# Patient Record
Sex: FEMALE | Race: OTHER | HISPANIC OR LATINO
[De-identification: names, ages, dates, MRNs, and addresses within clinical notes are randomized per-mention and may not be internally consistent; named-entity substitution may affect disease eponyms.]

---

## 2022-10-16 ENCOUNTER — TRANSCRIPTION ENCOUNTER (OUTPATIENT)
Age: 87
End: 2022-10-16

## 2022-10-16 VITALS
HEIGHT: 56 IN | SYSTOLIC BLOOD PRESSURE: 170 MMHG | HEART RATE: 100 BPM | RESPIRATION RATE: 18 BRPM | TEMPERATURE: 98 F | OXYGEN SATURATION: 98 % | DIASTOLIC BLOOD PRESSURE: 75 MMHG | WEIGHT: 93.92 LBS

## 2022-10-16 LAB
BASOPHILS # BLD AUTO: 0.05 K/UL — SIGNIFICANT CHANGE UP (ref 0–0.2)
BASOPHILS NFR BLD AUTO: 0.4 % — SIGNIFICANT CHANGE UP (ref 0–2)
BLD GP AB SCN SERPL QL: NEGATIVE — SIGNIFICANT CHANGE UP
EOSINOPHIL # BLD AUTO: 0.16 K/UL — SIGNIFICANT CHANGE UP (ref 0–0.5)
EOSINOPHIL NFR BLD AUTO: 1.4 % — SIGNIFICANT CHANGE UP (ref 0–6)
HCT VFR BLD CALC: 23.1 % — LOW (ref 34.5–45)
HGB BLD-MCNC: 7.8 G/DL — LOW (ref 11.5–15.5)
IMM GRANULOCYTES NFR BLD AUTO: 0.4 % — SIGNIFICANT CHANGE UP (ref 0–0.9)
LYMPHOCYTES # BLD AUTO: 17.7 % — SIGNIFICANT CHANGE UP (ref 13–44)
LYMPHOCYTES # BLD AUTO: 2.05 K/UL — SIGNIFICANT CHANGE UP (ref 1–3.3)
MCHC RBC-ENTMCNC: 31.1 PG — SIGNIFICANT CHANGE UP (ref 27–34)
MCHC RBC-ENTMCNC: 33.8 GM/DL — SIGNIFICANT CHANGE UP (ref 32–36)
MCV RBC AUTO: 92 FL — SIGNIFICANT CHANGE UP (ref 80–100)
MONOCYTES # BLD AUTO: 1.07 K/UL — HIGH (ref 0–0.9)
MONOCYTES NFR BLD AUTO: 9.2 % — SIGNIFICANT CHANGE UP (ref 2–14)
NEUTROPHILS # BLD AUTO: 8.22 K/UL — HIGH (ref 1.8–7.4)
NEUTROPHILS NFR BLD AUTO: 70.9 % — SIGNIFICANT CHANGE UP (ref 43–77)
NRBC # BLD: 0 /100 WBCS — SIGNIFICANT CHANGE UP (ref 0–0)
PLATELET # BLD AUTO: 252 K/UL — SIGNIFICANT CHANGE UP (ref 150–400)
RBC # BLD: 2.51 M/UL — LOW (ref 3.8–5.2)
RBC # FLD: 12.8 % — SIGNIFICANT CHANGE UP (ref 10.3–14.5)
RH IG SCN BLD-IMP: POSITIVE — SIGNIFICANT CHANGE UP
WBC # BLD: 11.6 K/UL — HIGH (ref 3.8–10.5)
WBC # FLD AUTO: 11.6 K/UL — HIGH (ref 3.8–10.5)

## 2022-10-16 PROCEDURE — 74174 CTA ABD&PLVS W/CONTRAST: CPT | Mod: 26,MA

## 2022-10-16 PROCEDURE — 99285 EMERGENCY DEPT VISIT HI MDM: CPT

## 2022-10-16 NOTE — ED PROVIDER NOTE - OBJECTIVE STATEMENT
88 y/o F, PMHx of PAD, angiogram of bilat LE on 10/14, comes in for pain and ecchymosis to R abdomen, flank, hip and thigh. As per daughter, they entered through ankle and not groin. They noted large area of ecchymosis. Pt was on ASA and Plavix. 86 y/o F, PMHx of PAD, had angiogram of bilat LE on 10/14 incl to R popliteal artery, comes in for pain and ecchymosis to R abdomen, flank, hip and thigh.   Pt is on asa and plavix.

## 2022-10-16 NOTE — ED ADULT NURSE NOTE - CHIEF COMPLAINT QUOTE
Pt had procedure on  R popliteal artery on 10/14, presents today co ecchymosis to R side of abdomen. Denies injury or trauma. On ASA and Plavix daily.
Alert and oriented to person, place and time/Patient baseline mental status/Awake/Symptoms improved/Dressing clean and dry/No adverse reaction to first time med in ED

## 2022-10-16 NOTE — ED PROVIDER NOTE - CARE PLAN
Principal Discharge DX:	Hematoma  Secondary Diagnosis:	Post-operative state  Secondary Diagnosis:	Anemia   1

## 2022-10-16 NOTE — ED ADULT NURSE NOTE - OBJECTIVE STATEMENT
Patient a+o x4, Afghan-speaking, hx obtained by daughter at bedside, c/o discoloration to right flank increasing in size since procedure on Friday. Taking asa and plavix daily, states no trauma/injury to area. Speaking in full sentences without difficulty, denies sob/cp/dizziness/n/v/fever/chills. IV initiated, labs collected and sent; tolerated well. Safety measures initiated, comfort measures rendered. Awaiting results.

## 2022-10-16 NOTE — ED PROVIDER NOTE - PROGRESS NOTE DETAILS
[jonh / sue]  received on sign out. pt here w right abd, flank, thigh ecchymosis and pain after RLE angiogram on 10/14 with right ankle access. no groin access.   thus far - hgb 8.1 / hct 23.4 -> hgb 7.8 / hct 23.1 at 4 hour lindsey. cta w large hematoma but no visualized active bleed.  at time of sign out planned for rpt cbc at 3AM. shoemaker - third CBC showing hgb 7.0 / hct 20.4. given continued drop and now at borderline for transfusion will consult vascular for evaluation. shoemaker - rpt cbc stable at 7.1 / hct 20.8. vascular to admit pt for continued management. shoemaker - vascular requesting another rpt cbc. will trend. dispo per vascular

## 2022-10-16 NOTE — ED ADULT TRIAGE NOTE - CHIEF COMPLAINT QUOTE
Pt had procedure on  R popliteal artery on 10/14, presents today co ecchymosis to R side of abdomen. Denies injury or trauma. On ASA and Plavix daily.

## 2022-10-16 NOTE — ED PROVIDER NOTE - PHYSICAL EXAMINATION
CONST: nontoxic NAD speaking in full sentences  HEAD: atraumatic  EYES: conjunctivae clear, PERRL, EOMI  ENT: mmm  NECK: supple/FROM, nttp, no jvd  CARD: rrr no murmurs  CHEST: ctab no r/r/w, no stridor/retractions/tripoding  ABD: soft, nd, (+) ecchymosis to R flank and abdominal ttp, extends to anterior lateral R thigh w/ hematoma and extend to pubic symphysis.   EXT: FROM, symmetric distal pulses intact  SKIN: warm, dry, no rash, no pedal edema/ttp/rash, cap refill <2sec  NEURO: a+ox3, 5/5 strength x4, gross sensation intact x4, baseline gait CONST: nontoxic NAD speaking in full sentences  HEAD: atraumatic  EYES: conjunctivae clear, PERRL, EOMI  ENT: mmm  NECK: supple/FROM, nttp, no jvd  CARD: rrr no murmurs  CHEST: ctab no r/r/w, no stridor/retractions/tripoding  ABD: soft, nd, (+) ecchymosis to R flank and abdominal wall, + ttp, extends to anterolateral R thigh w/ hematoma and extend to pubic symphysis.   EXT: FROM, symmetric distal pulses intact  SKIN: warm, dry, no rash, no pedal edema/ttp/rash, cap refill <2sec  NEURO: a+ox3, 5/5 strength x4, gross sensation intact x4

## 2022-10-16 NOTE — ED ADULT NURSE NOTE - NSIMPLEMENTINTERV_GEN_ALL_ED
Implemented All Fall with Harm Risk Interventions:  [place] to call system. Call bell, personal items and telephone within reach. Instruct patient to call for assistance. Room bathroom lighting operational. Non-slip footwear when patient is off stretcher. Physically safe environment: no spills, clutter or unnecessary equipment. Stretcher in lowest position, wheels locked, appropriate side rails in place. Provide visual cue, wrist band, yellow gown, etc. Monitor gait and stability. Monitor for mental status changes and reorient to person, place, and time. Review medications for side effects contributing to fall risk. Reinforce activity limits and safety measures with patient and family. Provide visual clues: red socks.

## 2022-10-16 NOTE — ED PROVIDER NOTE - CLINICAL SUMMARY MEDICAL DECISION MAKING FREE TEXT BOX
Concern for hematoma vs active bleeding post-angiogram  CTA to look for active bleed  serial CBCs to check for H&H      ---->CT shows large hematoma, NO ACTIVE BLEED. Plan for serial CBC and if H&H remains stable then pt can be dc with close follow up. Will sign out to Dr. San pending repeat CBC at 3AM.

## 2022-10-16 NOTE — ED PROVIDER NOTE - NSFOLLOWUPINSTRUCTIONS_ED_ALL_ED_FT
CALL YOUR VASCULAR DOCTOR FIRST THING TOMORROW MORNING. FOLLOW UP DUE TO YOUR EXTENSIVE BRUISING AND BLEEDING UNDER THE SKIN. ALSO ASK THEM IF YOU SHOULD CONTINUE YOUR ASPIRIN AND PLAVIX OR STOP IT FOR NOW.

## 2022-10-17 ENCOUNTER — INPATIENT (INPATIENT)
Facility: HOSPITAL | Age: 87
LOS: 0 days | Discharge: HOME | DRG: 921 | End: 2022-10-18
Attending: SURGERY | Admitting: SURGERY
Payer: MEDICARE

## 2022-10-17 LAB
ALBUMIN SERPL ELPH-MCNC: 3.3 G/DL — SIGNIFICANT CHANGE UP (ref 3.3–5)
ALP SERPL-CCNC: 85 U/L — SIGNIFICANT CHANGE UP (ref 40–120)
ALT FLD-CCNC: 8 U/L — LOW (ref 10–45)
ANION GAP SERPL CALC-SCNC: 10 MMOL/L — SIGNIFICANT CHANGE UP (ref 5–17)
APTT BLD: 26.8 SEC — LOW (ref 27.5–35.5)
AST SERPL-CCNC: 14 U/L — SIGNIFICANT CHANGE UP (ref 10–40)
BASOPHILS # BLD AUTO: 0.04 K/UL — SIGNIFICANT CHANGE UP (ref 0–0.2)
BASOPHILS NFR BLD AUTO: 0.4 % — SIGNIFICANT CHANGE UP (ref 0–2)
BILIRUB SERPL-MCNC: 0.3 MG/DL — SIGNIFICANT CHANGE UP (ref 0.2–1.2)
BUN SERPL-MCNC: 10 MG/DL — SIGNIFICANT CHANGE UP (ref 7–23)
CALCIUM SERPL-MCNC: 8.6 MG/DL — SIGNIFICANT CHANGE UP (ref 8.4–10.5)
CHLORIDE SERPL-SCNC: 100 MMOL/L — SIGNIFICANT CHANGE UP (ref 96–108)
CO2 SERPL-SCNC: 24 MMOL/L — SIGNIFICANT CHANGE UP (ref 22–31)
CREAT SERPL-MCNC: 0.55 MG/DL — SIGNIFICANT CHANGE UP (ref 0.5–1.3)
EGFR: 89 ML/MIN/1.73M2 — SIGNIFICANT CHANGE UP
EOSINOPHIL # BLD AUTO: 0.11 K/UL — SIGNIFICANT CHANGE UP (ref 0–0.5)
EOSINOPHIL NFR BLD AUTO: 1.2 % — SIGNIFICANT CHANGE UP (ref 0–6)
GLUCOSE BLDC GLUCOMTR-MCNC: 125 MG/DL — HIGH (ref 70–99)
GLUCOSE BLDC GLUCOMTR-MCNC: 212 MG/DL — HIGH (ref 70–99)
GLUCOSE BLDC GLUCOMTR-MCNC: 287 MG/DL — HIGH (ref 70–99)
GLUCOSE SERPL-MCNC: 355 MG/DL — HIGH (ref 70–99)
HCT VFR BLD CALC: 20.4 % — CRITICAL LOW (ref 34.5–45)
HCT VFR BLD CALC: 20.8 % — CRITICAL LOW (ref 34.5–45)
HCT VFR BLD CALC: 29.3 % — LOW (ref 34.5–45)
HGB BLD-MCNC: 10 G/DL — LOW (ref 11.5–15.5)
HGB BLD-MCNC: 7 G/DL — CRITICAL LOW (ref 11.5–15.5)
HGB BLD-MCNC: 7.1 G/DL — LOW (ref 11.5–15.5)
IMM GRANULOCYTES NFR BLD AUTO: 0.5 % — SIGNIFICANT CHANGE UP (ref 0–0.9)
INR BLD: 0.91 — SIGNIFICANT CHANGE UP (ref 0.88–1.16)
LYMPHOCYTES # BLD AUTO: 1.85 K/UL — SIGNIFICANT CHANGE UP (ref 1–3.3)
LYMPHOCYTES # BLD AUTO: 19.4 % — SIGNIFICANT CHANGE UP (ref 13–44)
MCHC RBC-ENTMCNC: 31.3 PG — SIGNIFICANT CHANGE UP (ref 27–34)
MCHC RBC-ENTMCNC: 31.4 PG — SIGNIFICANT CHANGE UP (ref 27–34)
MCHC RBC-ENTMCNC: 31.8 PG — SIGNIFICANT CHANGE UP (ref 27–34)
MCHC RBC-ENTMCNC: 34.1 GM/DL — SIGNIFICANT CHANGE UP (ref 32–36)
MCHC RBC-ENTMCNC: 34.1 GM/DL — SIGNIFICANT CHANGE UP (ref 32–36)
MCHC RBC-ENTMCNC: 34.3 GM/DL — SIGNIFICANT CHANGE UP (ref 32–36)
MCV RBC AUTO: 91.6 FL — SIGNIFICANT CHANGE UP (ref 80–100)
MCV RBC AUTO: 92.1 FL — SIGNIFICANT CHANGE UP (ref 80–100)
MCV RBC AUTO: 92.7 FL — SIGNIFICANT CHANGE UP (ref 80–100)
MONOCYTES # BLD AUTO: 0.94 K/UL — HIGH (ref 0–0.9)
MONOCYTES NFR BLD AUTO: 9.9 % — SIGNIFICANT CHANGE UP (ref 2–14)
NEUTROPHILS # BLD AUTO: 6.53 K/UL — SIGNIFICANT CHANGE UP (ref 1.8–7.4)
NEUTROPHILS NFR BLD AUTO: 68.6 % — SIGNIFICANT CHANGE UP (ref 43–77)
NRBC # BLD: 0 /100 WBCS — SIGNIFICANT CHANGE UP (ref 0–0)
PLATELET # BLD AUTO: 239 K/UL — SIGNIFICANT CHANGE UP (ref 150–400)
PLATELET # BLD AUTO: 245 K/UL — SIGNIFICANT CHANGE UP (ref 150–400)
PLATELET # BLD AUTO: 255 K/UL — SIGNIFICANT CHANGE UP (ref 150–400)
POTASSIUM SERPL-MCNC: 3.9 MMOL/L — SIGNIFICANT CHANGE UP (ref 3.5–5.3)
POTASSIUM SERPL-SCNC: 3.9 MMOL/L — SIGNIFICANT CHANGE UP (ref 3.5–5.3)
PROT SERPL-MCNC: 6.2 G/DL — SIGNIFICANT CHANGE UP (ref 6–8.3)
PROTHROM AB SERPL-ACNC: 10.8 SEC — SIGNIFICANT CHANGE UP (ref 10.5–13.4)
RBC # BLD: 2.2 M/UL — LOW (ref 3.8–5.2)
RBC # BLD: 2.27 M/UL — LOW (ref 3.8–5.2)
RBC # BLD: 3.18 M/UL — LOW (ref 3.8–5.2)
RBC # FLD: 13 % — SIGNIFICANT CHANGE UP (ref 10.3–14.5)
RBC # FLD: 13 % — SIGNIFICANT CHANGE UP (ref 10.3–14.5)
RBC # FLD: 13.5 % — SIGNIFICANT CHANGE UP (ref 10.3–14.5)
RH IG SCN BLD-IMP: POSITIVE — SIGNIFICANT CHANGE UP
SODIUM SERPL-SCNC: 134 MMOL/L — LOW (ref 135–145)
WBC # BLD: 10.01 K/UL — SIGNIFICANT CHANGE UP (ref 3.8–10.5)
WBC # BLD: 9.52 K/UL — SIGNIFICANT CHANGE UP (ref 3.8–10.5)
WBC # BLD: 9.78 K/UL — SIGNIFICANT CHANGE UP (ref 3.8–10.5)
WBC # FLD AUTO: 10.01 K/UL — SIGNIFICANT CHANGE UP (ref 3.8–10.5)
WBC # FLD AUTO: 9.52 K/UL — SIGNIFICANT CHANGE UP (ref 3.8–10.5)
WBC # FLD AUTO: 9.78 K/UL — SIGNIFICANT CHANGE UP (ref 3.8–10.5)

## 2022-10-17 PROCEDURE — 71045 X-RAY EXAM CHEST 1 VIEW: CPT | Mod: 26

## 2022-10-17 RX ORDER — INSULIN GLARGINE 100 [IU]/ML
12 INJECTION, SOLUTION SUBCUTANEOUS AT BEDTIME
Refills: 0 | Status: DISCONTINUED | OUTPATIENT
Start: 2022-10-17 | End: 2022-10-18

## 2022-10-17 RX ORDER — DEXTROSE 50 % IN WATER 50 %
25 SYRINGE (ML) INTRAVENOUS ONCE
Refills: 0 | Status: DISCONTINUED | OUTPATIENT
Start: 2022-10-17 | End: 2022-10-18

## 2022-10-17 RX ORDER — INSULIN LISPRO 100/ML
VIAL (ML) SUBCUTANEOUS
Refills: 0 | Status: DISCONTINUED | OUTPATIENT
Start: 2022-10-17 | End: 2022-10-18

## 2022-10-17 RX ORDER — SODIUM CHLORIDE 9 MG/ML
1000 INJECTION, SOLUTION INTRAVENOUS
Refills: 0 | Status: DISCONTINUED | OUTPATIENT
Start: 2022-10-17 | End: 2022-10-18

## 2022-10-17 RX ORDER — ATORVASTATIN CALCIUM 80 MG/1
40 TABLET, FILM COATED ORAL AT BEDTIME
Refills: 0 | Status: DISCONTINUED | OUTPATIENT
Start: 2022-10-17 | End: 2022-10-18

## 2022-10-17 RX ORDER — FUROSEMIDE 40 MG
10 TABLET ORAL ONCE
Refills: 0 | Status: COMPLETED | OUTPATIENT
Start: 2022-10-17 | End: 2022-10-17

## 2022-10-17 RX ORDER — INFLUENZA VIRUS VACCINE 15; 15; 15; 15 UG/.5ML; UG/.5ML; UG/.5ML; UG/.5ML
0.7 SUSPENSION INTRAMUSCULAR ONCE
Refills: 0 | Status: DISCONTINUED | OUTPATIENT
Start: 2022-10-17 | End: 2022-10-18

## 2022-10-17 RX ORDER — TRAMADOL HYDROCHLORIDE 50 MG/1
25 TABLET ORAL EVERY 6 HOURS
Refills: 0 | Status: DISCONTINUED | OUTPATIENT
Start: 2022-10-17 | End: 2022-10-18

## 2022-10-17 RX ORDER — RANOLAZINE 500 MG/1
500 TABLET, FILM COATED, EXTENDED RELEASE ORAL
Refills: 0 | Status: DISCONTINUED | OUTPATIENT
Start: 2022-10-17 | End: 2022-10-17

## 2022-10-17 RX ORDER — DILTIAZEM HCL 120 MG
120 CAPSULE, EXT RELEASE 24 HR ORAL EVERY 24 HOURS
Refills: 0 | Status: DISCONTINUED | OUTPATIENT
Start: 2022-10-17 | End: 2022-10-18

## 2022-10-17 RX ORDER — CILOSTAZOL 100 MG/1
50 TABLET ORAL
Refills: 0 | Status: DISCONTINUED | OUTPATIENT
Start: 2022-10-17 | End: 2022-10-17

## 2022-10-17 RX ORDER — DEXTROSE 50 % IN WATER 50 %
15 SYRINGE (ML) INTRAVENOUS ONCE
Refills: 0 | Status: DISCONTINUED | OUTPATIENT
Start: 2022-10-17 | End: 2022-10-18

## 2022-10-17 RX ORDER — INSULIN LISPRO 100/ML
5 VIAL (ML) SUBCUTANEOUS ONCE
Refills: 0 | Status: COMPLETED | OUTPATIENT
Start: 2022-10-17 | End: 2022-10-17

## 2022-10-17 RX ORDER — CLOPIDOGREL BISULFATE 75 MG/1
75 TABLET, FILM COATED ORAL DAILY
Refills: 0 | Status: DISCONTINUED | OUTPATIENT
Start: 2022-10-17 | End: 2022-10-18

## 2022-10-17 RX ORDER — ASPIRIN/CALCIUM CARB/MAGNESIUM 324 MG
81 TABLET ORAL DAILY
Refills: 0 | Status: DISCONTINUED | OUTPATIENT
Start: 2022-10-17 | End: 2022-10-18

## 2022-10-17 RX ORDER — MORPHINE SULFATE 50 MG/1
2 CAPSULE, EXTENDED RELEASE ORAL ONCE
Refills: 0 | Status: DISCONTINUED | OUTPATIENT
Start: 2022-10-17 | End: 2022-10-17

## 2022-10-17 RX ORDER — GLUCAGON INJECTION, SOLUTION 0.5 MG/.1ML
1 INJECTION, SOLUTION SUBCUTANEOUS ONCE
Refills: 0 | Status: DISCONTINUED | OUTPATIENT
Start: 2022-10-17 | End: 2022-10-18

## 2022-10-17 RX ORDER — SODIUM CHLORIDE 9 MG/ML
1000 INJECTION INTRAMUSCULAR; INTRAVENOUS; SUBCUTANEOUS
Refills: 0 | Status: DISCONTINUED | OUTPATIENT
Start: 2022-10-17 | End: 2022-10-17

## 2022-10-17 RX ORDER — LEVOTHYROXINE SODIUM 125 MCG
75 TABLET ORAL DAILY
Refills: 0 | Status: DISCONTINUED | OUTPATIENT
Start: 2022-10-17 | End: 2022-10-18

## 2022-10-17 RX ORDER — METOPROLOL TARTRATE 50 MG
25 TABLET ORAL EVERY 24 HOURS
Refills: 0 | Status: DISCONTINUED | OUTPATIENT
Start: 2022-10-17 | End: 2022-10-18

## 2022-10-17 RX ORDER — DEXTROSE 50 % IN WATER 50 %
12.5 SYRINGE (ML) INTRAVENOUS ONCE
Refills: 0 | Status: DISCONTINUED | OUTPATIENT
Start: 2022-10-17 | End: 2022-10-18

## 2022-10-17 RX ADMIN — Medication 4: at 22:17

## 2022-10-17 RX ADMIN — TRAMADOL HYDROCHLORIDE 25 MILLIGRAM(S): 50 TABLET ORAL at 22:19

## 2022-10-17 RX ADMIN — ATORVASTATIN CALCIUM 40 MILLIGRAM(S): 80 TABLET, FILM COATED ORAL at 21:51

## 2022-10-17 RX ADMIN — SODIUM CHLORIDE 50 MILLILITER(S): 9 INJECTION INTRAMUSCULAR; INTRAVENOUS; SUBCUTANEOUS at 12:12

## 2022-10-17 RX ADMIN — Medication 75 MICROGRAM(S): at 14:59

## 2022-10-17 RX ADMIN — Medication 81 MILLIGRAM(S): at 15:00

## 2022-10-17 RX ADMIN — Medication 6: at 12:38

## 2022-10-17 RX ADMIN — Medication 25 MILLIGRAM(S): at 14:59

## 2022-10-17 RX ADMIN — Medication 10 MILLIGRAM(S): at 19:06

## 2022-10-17 RX ADMIN — MORPHINE SULFATE 2 MILLIGRAM(S): 50 CAPSULE, EXTENDED RELEASE ORAL at 01:31

## 2022-10-17 RX ADMIN — TRAMADOL HYDROCHLORIDE 25 MILLIGRAM(S): 50 TABLET ORAL at 21:51

## 2022-10-17 RX ADMIN — INSULIN GLARGINE 12 UNIT(S): 100 INJECTION, SOLUTION SUBCUTANEOUS at 22:14

## 2022-10-17 RX ADMIN — Medication 120 MILLIGRAM(S): at 14:59

## 2022-10-17 RX ADMIN — CLOPIDOGREL BISULFATE 75 MILLIGRAM(S): 75 TABLET, FILM COATED ORAL at 14:59

## 2022-10-17 RX ADMIN — MORPHINE SULFATE 2 MILLIGRAM(S): 50 CAPSULE, EXTENDED RELEASE ORAL at 01:07

## 2022-10-17 NOTE — H&P ADULT - ASSESSMENT
A/P: 86 y/o F, Hx of HTN, DM, Hypothroidism and cardiac stent.  Pt s/p RLE angiogram through R ankle (per daughter) on 10/14.  Pt had L leg angiogram 1mth prior.   Pt scheduled to have repeat procedure on 10/28.     Pt presents to [facility] c/o pain and ecchymosis to R abdomen, flank, hip and thigh.  Pt's daughter states the onset of symptoms was approx 24hrs after procedure. CT shows Abdominal wall hematoma right flank and lower quadrant approximally 7 x 1.7 by 18cm.     Vascular/PAD:  - admit to Dr. Salas       HTN/HLD:  resume metoprolol   resume diltiazem     CAD/CHF:   ASA  plavix     DM:  lantus 12 QHS, ISS     Anemia:   - trend hgb   - 1PRBC now   - f/u am labs     Diet:   consistent carbs     Activity:   as adi     DVTPPx:   holding     Dispo: 5 uris

## 2022-10-17 NOTE — CONSULT NOTE ADULT - ASSESSMENT
A/P: 87 y.o female s/p RLE angio complicated by R RP hematoma; down trending cbc without active bleed.    -Repeat stat cbc  -Vascular will follow A/P: 87 y.o female s/p RLE angio complicated by R Abdominal wall hematoma; down trending cbc without active bleed.    -Repeat stat cbc  -Vascular will follow

## 2022-10-17 NOTE — CONSULT NOTE ADULT - SUBJECTIVE AND OBJECTIVE BOX
Vascular Attending:  Josue      HPI:  86 y/o F, Hx of HTN, DM, Hypothroidism and cardiac stent.  Pt s/p RLE angiogram through R ankle (per daughter)  on 10/14.  Pt scheduled to have repeat procedure on 10/28.     Pt presents to [facility] c/o pain and ecchymosis to R abdomen, flank, hip and thigh. Pt's daughter states the onset of symptoms was approx 24hrs after procedure.  Denies CP, blurry vision, N, V.        PAST MEDICAL & SURGICAL HISTORY:  B/L lower extremity Angiograms  Cardiac stent    REVIEW OF SYSTEMS  Neg except as in HPI      Allergic/Immunologic:	  NKDA    Allergies  No Known Allergies    Vital Signs Last 24 Hrs  T(C): 36.7 (16 Oct 2022 17:46), Max: 36.7 (16 Oct 2022 17:46)  T(F): 98.1 (16 Oct 2022 17:46), Max: 98.1 (16 Oct 2022 17:46)  HR: 90 (17 Oct 2022 01:10) (90 - 100)  BP: 170/76 (17 Oct 2022 01:10) (167/71 - 170/76)  BP(mean): --  RR: 16 (17 Oct 2022 01:10) (16 - 18)  SpO2: 95% (17 Oct 2022 01:10) (95% - 98%)    Parameters below as of 17 Oct 2022 01:10  Patient On (Oxygen Delivery Method): room air    PHYSICAL EXAM:    Constitutional:  PT AXOX3 in NAD    Respiratory: Non Labored    Cardiovascular: S1S2 RRR    Gastrointestinal: R induration , mild tenderness, ecchymotic to R flank/pelvis    Extremities: warm no lesions    Vascular: R. Tri DP/PT palp fem  L Bi/Tri Dp/Pt    Neurological: intact motor sensori        LABS:                        7.0    9.78  )-----------( 245      ( 17 Oct 2022 03:07 )             20.4     10-16    131<L>  |  98  |  12  ----------------------------<  301<H>  See Note   |  24  |  0.49<L>    Ca    8.7      16 Oct 2022 18:53    TPro  7.6  /  Alb  See Note  /  TBili  0.3  /  DBili  x   /  AST  See Note  /  ALT  14  /  AlkPhos  See Note  10-16    PT/INR - ( 16 Oct 2022 23:57 )   PT: 10.8 sec;   INR: 0.91          PTT - ( 16 Oct 2022 23:57 )  PTT:26.8 sec      RADIOLOGY & ADDITIONAL STUDIES Vascular Attending:  Josue      HPI:  88 y/o F, Hx of HTN, DM, Hypothroidism and cardiac stent.  Pt s/p RLE angiogram through R ankle (per daughter) on 10/14.  Pt had L leg angiogram 1mth prior.   Pt scheduled to have repeat procedure on 10/28.     Pt presents to [facility] c/o pain and ecchymosis to R abdomen, flank, hip and thigh.  Pt's daughter states the onset of symptoms was approx 24hrs after procedure.  Denies CP, blurry vision, N, V.        PAST MEDICAL & SURGICAL HISTORY:  B/L lower extremity Angiograms  Cardiac stent    REVIEW OF SYSTEMS  Neg except as in HPI      Allergic/Immunologic:	  NKDA    Allergies  No Known Allergies    Vital Signs Last 24 Hrs  T(C): 36.7 (16 Oct 2022 17:46), Max: 36.7 (16 Oct 2022 17:46)  T(F): 98.1 (16 Oct 2022 17:46), Max: 98.1 (16 Oct 2022 17:46)  HR: 90 (17 Oct 2022 01:10) (90 - 100)  BP: 170/76 (17 Oct 2022 01:10) (167/71 - 170/76)  BP(mean): --  RR: 16 (17 Oct 2022 01:10) (16 - 18)  SpO2: 95% (17 Oct 2022 01:10) (95% - 98%)    Parameters below as of 17 Oct 2022 01:10  Patient On (Oxygen Delivery Method): room air    PHYSICAL EXAM:    Constitutional:  PT AXOX3 in NAD    Respiratory: Non Labored    Cardiovascular: S1S2 RRR    Gastrointestinal: R induration , mild tenderness, ecchymotic to R flank/pelvis    Extremities: warm no lesions    Vascular: R. Tri DP/PT palp fem  L Bi/Tri Dp/Pt    Neurological: intact motor sensori        LABS:                        7.0    9.78  )-----------( 245      ( 17 Oct 2022 03:07 )             20.4     10-16    131<L>  |  98  |  12  ----------------------------<  301<H>  See Note   |  24  |  0.49<L>    Ca    8.7      16 Oct 2022 18:53    TPro  7.6  /  Alb  See Note  /  TBili  0.3  /  DBili  x   /  AST  See Note  /  ALT  14  /  AlkPhos  See Note  10-16    PT/INR - ( 16 Oct 2022 23:57 )   PT: 10.8 sec;   INR: 0.91          PTT - ( 16 Oct 2022 23:57 )  PTT:26.8 sec      RADIOLOGY & ADDITIONAL STUDIES

## 2022-10-17 NOTE — BRIEF OPERATIVE NOTE - OPERATION/FINDINGS
RLE diagnostic angiogram performed via L CFA access. Max sheath size 5F. Angiogram demonstrates normal femoral artery without any blush of contrast, dissection, pseudoaneurysm, or other abnormal pathology noted. Sheath pulled. Manual pressure held. No intervention performed.

## 2022-10-17 NOTE — H&P ADULT - HISTORY OF PRESENT ILLNESS
88 y/o F, Hx of HTN, DM, Hypothroidism and cardiac stent.  Pt s/p RLE angiogram through R ankle (per daughter) on 10/14.  Pt had L leg angiogram 1mth prior.   Pt scheduled to have repeat procedure on 10/28.     Pt presents to [facility] c/o pain and ecchymosis to R abdomen, flank, hip and thigh.  Pt's daughter states the onset of symptoms was approx 24hrs after procedure.  Denies CP, blurry vision, N, V.

## 2022-10-17 NOTE — ED ADULT NURSE REASSESSMENT NOTE - NS ED NURSE REASSESS COMMENT FT1
Patient a+o x4. Repeat labs collected, awaiting CT read. Waiting time explained to patient and family. Food provided.
Vascular surgery team at bedside for consult.
Pt butterflied for blood specimen and samples sent to lab. Pt and family member up to date on plan of care with understanding verbalized.
Pt medicated for pain. Pt repositioned in stretcher. Pt and family UTD on plan of care with understanding verbalized.

## 2022-10-17 NOTE — PATIENT PROFILE ADULT - BRAND OF COVID-19 VACCINATION
Please try to get her in in the next 2 weeks - can take the place of a virtual visit-- I have not seen in the last 16 months and she missed her last several appointments.  I gave her 2 weeks worth of her lasix     Pfizer dose 1, 2, and 3

## 2022-10-18 ENCOUNTER — TRANSCRIPTION ENCOUNTER (OUTPATIENT)
Age: 87
End: 2022-10-18

## 2022-10-18 VITALS
DIASTOLIC BLOOD PRESSURE: 71 MMHG | HEART RATE: 84 BPM | SYSTOLIC BLOOD PRESSURE: 157 MMHG | RESPIRATION RATE: 16 BRPM | OXYGEN SATURATION: 97 %

## 2022-10-18 PROBLEM — Z00.00 ENCOUNTER FOR PREVENTIVE HEALTH EXAMINATION: Status: ACTIVE | Noted: 2022-10-18

## 2022-10-18 LAB
ANION GAP SERPL CALC-SCNC: 6 MMOL/L — SIGNIFICANT CHANGE UP (ref 5–17)
BUN SERPL-MCNC: 15 MG/DL — SIGNIFICANT CHANGE UP (ref 7–23)
CALCIUM SERPL-MCNC: 8.6 MG/DL — SIGNIFICANT CHANGE UP (ref 8.4–10.5)
CHLORIDE SERPL-SCNC: 104 MMOL/L — SIGNIFICANT CHANGE UP (ref 96–108)
CO2 SERPL-SCNC: 26 MMOL/L — SIGNIFICANT CHANGE UP (ref 22–31)
CREAT SERPL-MCNC: 0.68 MG/DL — SIGNIFICANT CHANGE UP (ref 0.5–1.3)
EGFR: 84 ML/MIN/1.73M2 — SIGNIFICANT CHANGE UP
GLUCOSE BLDC GLUCOMTR-MCNC: 131 MG/DL — HIGH (ref 70–99)
GLUCOSE BLDC GLUCOMTR-MCNC: 56 MG/DL — LOW (ref 70–99)
GLUCOSE BLDC GLUCOMTR-MCNC: 57 MG/DL — LOW (ref 70–99)
GLUCOSE BLDC GLUCOMTR-MCNC: 63 MG/DL — LOW (ref 70–99)
GLUCOSE SERPL-MCNC: 65 MG/DL — LOW (ref 70–99)
HCT VFR BLD CALC: 26.5 % — LOW (ref 34.5–45)
HGB BLD-MCNC: 9 G/DL — LOW (ref 11.5–15.5)
MAGNESIUM SERPL-MCNC: 1.8 MG/DL — SIGNIFICANT CHANGE UP (ref 1.6–2.6)
MCHC RBC-ENTMCNC: 31.4 PG — SIGNIFICANT CHANGE UP (ref 27–34)
MCHC RBC-ENTMCNC: 34 GM/DL — SIGNIFICANT CHANGE UP (ref 32–36)
MCV RBC AUTO: 92.3 FL — SIGNIFICANT CHANGE UP (ref 80–100)
NRBC # BLD: 0 /100 WBCS — SIGNIFICANT CHANGE UP (ref 0–0)
PHOSPHATE SERPL-MCNC: 3.1 MG/DL — SIGNIFICANT CHANGE UP (ref 2.5–4.5)
PLATELET # BLD AUTO: 251 K/UL — SIGNIFICANT CHANGE UP (ref 150–400)
POTASSIUM SERPL-MCNC: 3.7 MMOL/L — SIGNIFICANT CHANGE UP (ref 3.5–5.3)
POTASSIUM SERPL-SCNC: 3.7 MMOL/L — SIGNIFICANT CHANGE UP (ref 3.5–5.3)
RBC # BLD: 2.87 M/UL — LOW (ref 3.8–5.2)
RBC # FLD: 13.8 % — SIGNIFICANT CHANGE UP (ref 10.3–14.5)
SODIUM SERPL-SCNC: 136 MMOL/L — SIGNIFICANT CHANGE UP (ref 135–145)
WBC # BLD: 8.8 K/UL — SIGNIFICANT CHANGE UP (ref 3.8–10.5)
WBC # FLD AUTO: 8.8 K/UL — SIGNIFICANT CHANGE UP (ref 3.8–10.5)

## 2022-10-18 PROCEDURE — 99221 1ST HOSP IP/OBS SF/LOW 40: CPT

## 2022-10-18 RX ORDER — INSULIN GLARGINE 100 [IU]/ML
12 INJECTION, SOLUTION SUBCUTANEOUS
Qty: 0 | Refills: 0 | DISCHARGE
Start: 2022-10-18

## 2022-10-18 RX ORDER — LEVOTHYROXINE SODIUM 125 MCG
1 TABLET ORAL
Qty: 0 | Refills: 0 | DISCHARGE
Start: 2022-10-18

## 2022-10-18 RX ORDER — ASPIRIN/CALCIUM CARB/MAGNESIUM 324 MG
1 TABLET ORAL
Qty: 0 | Refills: 0 | DISCHARGE
Start: 2022-10-18

## 2022-10-18 RX ORDER — DEXTROSE 50 % IN WATER 50 %
12.5 SYRINGE (ML) INTRAVENOUS ONCE
Refills: 0 | Status: COMPLETED | OUTPATIENT
Start: 2022-10-18 | End: 2022-10-18

## 2022-10-18 RX ORDER — CLOPIDOGREL BISULFATE 75 MG/1
1 TABLET, FILM COATED ORAL
Qty: 0 | Refills: 0 | DISCHARGE
Start: 2022-10-18

## 2022-10-18 RX ORDER — TRAMADOL HYDROCHLORIDE 50 MG/1
0.5 TABLET ORAL
Qty: 4 | Refills: 0
Start: 2022-10-18

## 2022-10-18 RX ORDER — METOPROLOL TARTRATE 50 MG
1 TABLET ORAL
Qty: 0 | Refills: 0 | DISCHARGE
Start: 2022-10-18

## 2022-10-18 RX ORDER — POTASSIUM CHLORIDE 20 MEQ
20 PACKET (EA) ORAL ONCE
Refills: 0 | Status: COMPLETED | OUTPATIENT
Start: 2022-10-18 | End: 2022-10-18

## 2022-10-18 RX ORDER — MAGNESIUM OXIDE 400 MG ORAL TABLET 241.3 MG
400 TABLET ORAL ONCE
Refills: 0 | Status: COMPLETED | OUTPATIENT
Start: 2022-10-18 | End: 2022-10-18

## 2022-10-18 RX ORDER — ATORVASTATIN CALCIUM 80 MG/1
1 TABLET, FILM COATED ORAL
Qty: 0 | Refills: 0 | DISCHARGE
Start: 2022-10-18

## 2022-10-18 RX ORDER — DILTIAZEM HCL 120 MG
1 CAPSULE, EXT RELEASE 24 HR ORAL
Qty: 0 | Refills: 0 | DISCHARGE
Start: 2022-10-18

## 2022-10-18 RX ADMIN — MAGNESIUM OXIDE 400 MG ORAL TABLET 400 MILLIGRAM(S): 241.3 TABLET ORAL at 08:44

## 2022-10-18 RX ADMIN — Medication 120 MILLIGRAM(S): at 11:45

## 2022-10-18 RX ADMIN — Medication 25 MILLIGRAM(S): at 11:45

## 2022-10-18 RX ADMIN — Medication 75 MICROGRAM(S): at 07:11

## 2022-10-18 RX ADMIN — Medication 81 MILLIGRAM(S): at 11:45

## 2022-10-18 RX ADMIN — TRAMADOL HYDROCHLORIDE 25 MILLIGRAM(S): 50 TABLET ORAL at 09:44

## 2022-10-18 RX ADMIN — Medication 12.5 GRAM(S): at 07:12

## 2022-10-18 RX ADMIN — TRAMADOL HYDROCHLORIDE 25 MILLIGRAM(S): 50 TABLET ORAL at 08:44

## 2022-10-18 RX ADMIN — CLOPIDOGREL BISULFATE 75 MILLIGRAM(S): 75 TABLET, FILM COATED ORAL at 11:45

## 2022-10-18 RX ADMIN — Medication 20 MILLIEQUIVALENT(S): at 08:44

## 2022-10-18 NOTE — DISCHARGE NOTE PROVIDER - CARE PROVIDERS DIRECT ADDRESSES
,[email or web address] ,[email or web address]

## 2022-10-18 NOTE — DISCHARGE NOTE PROVIDER - NSDCCPCAREPLAN_GEN_ALL_CORE_FT
PRINCIPAL DISCHARGE DIAGNOSIS  Diagnosis: Hematoma  Assessment and Plan of Treatment:       SECONDARY DISCHARGE DIAGNOSES  Diagnosis: Post-operative state  Assessment and Plan of Treatment:     Diagnosis: Anemia  Assessment and Plan of Treatment:

## 2022-10-18 NOTE — DISCHARGE NOTE PROVIDER - NSDCMRMEDTOKEN_GEN_ALL_CORE_FT
aspirin 81 mg oral tablet, chewable: 1 tab(s) orally once a day  atorvastatin 40 mg oral tablet: 1 tab(s) orally once a day (at bedtime)  clopidogrel 75 mg oral tablet: 1 tab(s) orally once a day  dilTIAZem 120 mg/24 hours oral capsule, extended release: 1 cap(s) orally every 24 hours  insulin glargine 100 units/mL subcutaneous solution: 12 unit(s) subcutaneous once a day (at bedtime)  levothyroxine 75 mcg (0.075 mg) oral tablet: 1 tab(s) orally once a day  metFORMIN 500 mg oral tablet, extended release: 1 tab(s) orally once a day  metoprolol succinate 25 mg oral tablet, extended release: 1 tab(s) orally every 24 hours   aspirin 81 mg oral tablet, chewable: 1 tab(s) orally once a day  atorvastatin 40 mg oral tablet: 1 tab(s) orally once a day (at bedtime)  clopidogrel 75 mg oral tablet: 1 tab(s) orally once a day  dilTIAZem 120 mg/24 hours oral capsule, extended release: 1 cap(s) orally every 24 hours  insulin glargine 100 units/mL subcutaneous solution: 12 unit(s) subcutaneous once a day (at bedtime)  levothyroxine 75 mcg (0.075 mg) oral tablet: 1 tab(s) orally once a day  metFORMIN 500 mg oral tablet, extended release: 1 tab(s) orally once a day  metoprolol succinate 25 mg oral tablet, extended release: 1 tab(s) orally every 24 hours  traMADol 50 mg oral tablet: 0.5 tab(s) orally every 8 hours, As Needed -Severe Pain (7 - 10) MDD:75 mg

## 2022-10-18 NOTE — CONSULT NOTE ADULT - ASSESSMENT
87-year-old female with a PMHx of HTN, DMII, hypothyroidism, CAD (s/p PCI, about 6-12 months ago at [facility], on DAPT) and PAD (s/p RLE angiogram on 10/14) who presented with pain and ecchymosis of right abdomen, flank, hip and thigh that started approximately 24 hours following angiogram.  CTA showed abdominal wall hematoma without active bleeding.  Patient underwent diagnostic angiogram on 10/17 which showed normal femoral artery without any blush of contrast, dissection, pseudoaneurysm, or other abnormal pathology.     #Right Flank Hematoma   -CTA showed abdominal wall hematoma without active bleeding   -s/p diagnostic angiogram (10/17) which was without abnormal pathology   -rest of management as per primary team    -IS and bowel regimen      #Urinary Retention   -s/p straight cath but has voided spontaneously since, likely in setting of anesthesia    -outpatient urology follow up      #Anemia   -s/p 1-unit pRBCs on admission, now stable   -continue to monitor     #HTN   -continue with home metoprolol and diltiazem      #CAD   -continue with ASA and Plavix       #DMII   -home regimen: Metformin 500mg daily and Lantus 10 or 15 units pending nocturnal blood glucose   -continue with lantus 12 units qhs and ISS while inpatient   -hypoglycemia protocol and diabetic diet    -need outpatient PCP and/or Endocrine follow up to discuss Lantus dosing     Dispo: home today

## 2022-10-18 NOTE — DISCHARGE NOTE PROVIDER - NSDCCPTREATMENT_GEN_ALL_CORE_FT
PRINCIPAL PROCEDURE  Procedure: Angiogram of femoral vessel  Findings and Treatment: 10/17/2022

## 2022-10-18 NOTE — DISCHARGE NOTE PROVIDER - HOSPITAL COURSE
88 y/o F, Hx of HTN, DM, Hypothroidism and cardiac stent.  Pt s/p RLE angiogram through R ankle (per daughter) on 10/14.  Pt had L leg angiogram 1mth prior.   Pt scheduled to have repeat procedure on 10/28.  Pt presents to [facility] c/o pain and ecchymosis to R abdomen, flank, hip and thigh.  Pt's daughter states the onset of symptoms was approx 24hrs after procedure.  Denies CP, blurry vision, N, V.      PT hd presented to the ED and got a CTA shows that abdominal wall hematoma right flank and lower quadrant approximally 7 x 1.7 by 18 cm. No active bleeding source identified. PT then went for a diagnostic RLE angiogram, that was unremarkable. PT was monitored overnight, c/o of some pain and was treated with tramadol. PT is stable to discharge home.    Upon this admission we are holding cilostazol due to the bleed, please reassess continuation of medication upon your follow up with ur vascular surgeon.       88 y/o F, Hx of HTN, DM, Hypothroidism and cardiac stent.  Pt s/p RLE angiogram through R ankle (per daughter) on 10/14.  Pt had L leg angiogram 1mth prior.   Pt scheduled to have repeat procedure on 10/28.  Pt presents to [facility] c/o pain and ecchymosis to R abdomen, flank, hip and thigh.  Pt's daughter states the onset of symptoms was approx 24hrs after procedure.  Denies CP, blurry vision, N, V.      PT hd presented to the ED and got a CTA shows that abdominal wall hematoma right flank and lower quadrant approximally 7 x 1.7 by 18 cm. No active bleeding source identified. PT then went for a diagnostic RLE angiogram, that was unremarkable. PT was monitored overnight, c/o of some pain and was treated with tramadol. PT is stable to discharge home.    Upon this admission we are holding cilostazol due to the bleed, please reassess continuation of medication upon your follow up with ur vascular surgeon.

## 2022-10-18 NOTE — DISCHARGE NOTE PROVIDER - NSDCFUADDINST_GEN_ALL_CORE_FT
FOLLOW UP: Dr. Salas in 1 week. Your appointment has been made for _______. Call the office at  with any questions...  WOUND CARE: You may shower; soap and water over incision sites. Do not scrub. Pat dry when done.   ACTIVITY: Ambulate as tolerated, but no heavy lifting (>10lbs) or strenuous exercise....  DIET: You may resume regular diet.     Call the office if you experience increasing pain, redness, swelling or drainage from incision sites/wounds, or temperature >101.4F.   NEW MEDICATIONS:  ADDITIONAL FOLLOW UP AFTER DISCHARGE: we are holding cilostazol 50mg BID. Please reassess with your vascular surgeon when to resume.   DISCHARGE DESTINATION: home    FOLLOW UP: your vascular surgeon in 1 week. IF you have any questions. Call the office at .   WOUND CARE: You may shower; soap and water over incision sites. Do not scrub. Pat dry when done.   ACTIVITY: Ambulate as tolerated, but no heavy lifting (>10lbs) or strenuous exercise....  DIET: You may resume regular diet.     Call the office if you experience increasing pain, redness, swelling or drainage from incision sites/wounds, or temperature >101.4F.   NEW MEDICATIONS:  ADDITIONAL FOLLOW UP AFTER DISCHARGE: we are holding cilostazol 50mg BID. Please reassess with your vascular surgeon when to resume.   DISCHARGE DESTINATION: home    FOLLOW UP: your vascular surgeon in 1 week.   WOUND CARE: You may shower; soap and water over incision sites. Do not scrub. Pat dry when done.   ACTIVITY: Ambulate as tolerated, but no heavy lifting (>10lbs) or strenuous exercise....  DIET: You may resume regular diet.     Call the office if you experience increasing pain, redness, swelling or drainage from incision sites/wounds, or temperature >101.4F.   NEW MEDICATIONS: None   ADDITIONAL FOLLOW UP AFTER DISCHARGE: we are holding cilostazol 50mg BID. Please reassess with your vascular surgeon when to resume. Our office number:   DISCHARGE DESTINATION: home

## 2022-10-18 NOTE — DISCHARGE NOTE NURSING/CASE MANAGEMENT/SOCIAL WORK - NSDCPEFALRISK_GEN_ALL_CORE
For information on Fall & Injury Prevention, visit: [email or web address] OR  [email or web address] OR  https://www.cdc.gov/steadi/patient.html

## 2022-10-18 NOTE — DISCHARGE NOTE NURSING/CASE MANAGEMENT/SOCIAL WORK - PATIENT PORTAL LINK FT
You can access the FollowMyHealth Patient Portal offered by [facility] by registering at the following website: [email or web address]. By joining [employer]’s FollowMyHealth portal, you will also be able to view your health information using other applications (apps) compatible with our system.

## 2022-10-18 NOTE — CONSULT NOTE ADULT - SUBJECTIVE AND OBJECTIVE BOX
87-year-old female with a PMHx of HTN, DMII, hypothyroidism, CAD (s/p PCI, about 6-12 months ago at [facility], on DAPT) and PAD (s/p RLE angiogram on 10/14) who presented with pain and ecchymosis of right abdomen, flank, hip and thigh that started approximately 24 hours following angiogram.  CTA showed abdominal wall hematoma without active bleeding.  Patient underwent diagnostic angiogram on 10/17 which showed normal femoral artery without any blush of contrast, dissection, pseudoaneurysm, or other abnormal pathology.  Post-op course complicated by urinary retention requiring straight cath but was able to void spontaneously following IV Lasix.      Today, patient is doing well and eager to be discharged home.  Still with some pain on the right flank but this is improved with pain medication.  Now urinating spontaneously without issue.  Denies hematuria, dysuria or suprapubic tenderness (has right sided flank pain but this is related to the hematoma).  Denies HA, CP, SOB, abdominal pain, nausea, vomiting, fever, chills or diarrhea.    PMHx/PSHx: as per HPI     FHx: non-contributory      SHx:      Allergies: NKDA     Home Medications:    -ASA 81mg daily  -Atorvastatin 40mg daily  -Cilastazol 50mg daily  -Clopidogrel 75mg daily  -Diltiazem 120mg daily  -Lantus 10-15 units qhs (patient's daughter does it as a "sliding scale" every night)  -Levothyroxine 75mcg daily  -Metformin 500mg daily   -Metoprolol Succinate 25mg daily  -Ranolazine 500mg BID     Objective:   Vital Signs:  T(C): 36.3 (18 Oct 2022 10:22), Max: 37.2 (18 Oct 2022 04:41)  T(F): 97.4 (18 Oct 2022 10:22), Max: 98.9 (18 Oct 2022 04:41)  HR: 80 (18 Oct 2022 08:27) (77 - 88)  BP: 157/67 (18 Oct 2022 08:27) (114/59 - 176/78)  BP(mean): 92 (18 Oct 2022 08:27) (92 - 106)  RR: 16 (18 Oct 2022 08:27) (15 - 19)  SpO2: 97% (18 Oct 2022 08:27) (93% - 100%)    Parameters below as of 18 Oct 2022 08:27  Patient On (Oxygen Delivery Method): room air    Physical Exam:   -Gen: NAD, resting in bed, pleasant and cooperative  -HEENT: EOMI, PERRL, no scleral icterus, no JVD, no bruits  -CV: normal S1 and S2, no murmurs appreciated   -Lungs: CTABL, no wheezes or crackles, normal respiratory effort on RA  -Ab: ecchymosis of right flank, soft, NT, ND, normal BS  -Ext: no LE edema, no rashes  -Neuro: A&O x 3, CN 2-12 intact, no focal deficits     Labs:                        9.0    8.80  )-----------( 251      ( 18 Oct 2022 05:30 )             26.5       10-18    136  |  104  |  15  ----------------------------<  65<L>  3.7   |  26  |  0.68    Ca    8.6      18 Oct 2022 05:30  Phos  3.1     10-18  Mg     1.8     10-18    TPro  6.2  /  Alb  3.3  /  TBili  0.3  /  DBili  x   /  AST  14  /  ALT  8<L>  /  AlkPhos  85  10-17    Medications:  MEDICATIONS  (STANDING):  aspirin  chewable 81 milliGRAM(s) Oral daily  atorvastatin 40 milliGRAM(s) Oral at bedtime  clopidogrel Tablet 75 milliGRAM(s) Oral daily  dextrose 5%. 1000 milliLiter(s) (50 mL/Hr) IV Continuous <Continuous>  dextrose 5%. 1000 milliLiter(s) (100 mL/Hr) IV Continuous <Continuous>  dextrose 50% Injectable 25 Gram(s) IV Push once  dextrose 50% Injectable 12.5 Gram(s) IV Push once  dextrose 50% Injectable 25 Gram(s) IV Push once  diltiazem    milliGRAM(s) Oral every 24 hours  glucagon  Injectable 1 milliGRAM(s) IntraMuscular once  influenza  Vaccine (HIGH DOSE) 0.7 milliLiter(s) IntraMuscular once  insulin glargine Injectable (LANTUS) 12 Unit(s) SubCutaneous at bedtime  insulin lispro (ADMELOG) corrective regimen sliding scale   SubCutaneous Before meals and at bedtime  levothyroxine 75 MICROGram(s) Oral daily  metoprolol succinate ER 25 milliGRAM(s) Oral every 24 hours    MEDICATIONS  (PRN):  dextrose Oral Gel 15 Gram(s) Oral once PRN Blood Glucose LESS THAN 70 milliGRAM(s)/deciliter  traMADol 25 milliGRAM(s) Oral every 6 hours PRN Severe Pain (7 - 10)

## 2022-10-18 NOTE — PROGRESS NOTE ADULT - SUBJECTIVE AND OBJECTIVE BOX
O/N: Bladder scan around 6PM was 924cc. Voided 500cc, IV Lasix 10 and straight cath 350cc, PVR 50cc. Tramadol for flank pain.    Subjective: PT was seen with daughter at bedside. PT c/o of some pain overnight, treated with pain medication but overall improved    ROS:   Denies Headache, blurred vision, Chest Pain, SOB, Abdominal pain, nausea or vomiting     Social   aspirin  chewable 81  clopidogrel Tablet 75  diltiazem     metoprolol succinate ER 25      Allergies    No Known Allergies    Intolerances        Vital Signs Last 24 Hrs  T(C): 37.2 (18 Oct 2022 04:41), Max: 37.2 (18 Oct 2022 04:41)  T(F): 98.9 (18 Oct 2022 04:41), Max: 98.9 (18 Oct 2022 04:41)  HR: 77 (18 Oct 2022 05:43) (77 - 103)  BP: 114/59 (18 Oct 2022 05:43) (114/59 - 176/78)  BP(mean): 96 (17 Oct 2022 19:05) (96 - 106)  RR: 16 (18 Oct 2022 05:43) (15 - 19)  SpO2: 93% (18 Oct 2022 05:43) (93% - 100%)    Parameters below as of 18 Oct 2022 05:43  Patient On (Oxygen Delivery Method): room air      I&O's Summary    17 Oct 2022 07:01  -  18 Oct 2022 07:00  --------------------------------------------------------  IN: 200 mL / OUT: 1106 mL / NET: -906 mL        Physical Exam:  Constitutional:  PT AXOX3 in NAD    Respiratory: Non Labored    Cardiovascular: S1S2 RRR    Gastrointestinal: R induration , mild tenderness, ecchymotic to R flank/pelvis    Extremities: warm no lesions    Vascular: R. Tri DP/PT palp fem  L Bi/Tri Dp/Pt    Neurological: intact motor sensori      LABS:                        9.0    8.80  )-----------( 251      ( 18 Oct 2022 05:30 )             26.5     10-18    136  |  104  |  x   ----------------------------<  65<L>  3.7   |  26  |  0.68    Ca    8.6      18 Oct 2022 05:30  Phos  3.1     10-18  Mg     1.8     10-18    TPro  6.2  /  Alb  3.3  /  TBili  0.3  /  DBili  x   /  AST  14  /  ALT  8<L>  /  AlkPhos  85  10-17    PT/INR - ( 16 Oct 2022 23:57 )   PT: 10.8 sec;   INR: 0.91          PTT - ( 16 Oct 2022 23:57 )  PTT:26.8 sec        Patient is a 87y old  Female who presents with a chief complaint of abd wall hematoma (17 Oct 2022 15:49)            MEDICATIONS  (STANDING):  aspirin  chewable 81 milliGRAM(s) Oral daily  atorvastatin 40 milliGRAM(s) Oral at bedtime  clopidogrel Tablet 75 milliGRAM(s) Oral daily  dextrose 5%. 1000 milliLiter(s) (50 mL/Hr) IV Continuous <Continuous>  dextrose 5%. 1000 milliLiter(s) (100 mL/Hr) IV Continuous <Continuous>  dextrose 50% Injectable 25 Gram(s) IV Push once  dextrose 50% Injectable 12.5 Gram(s) IV Push once  dextrose 50% Injectable 25 Gram(s) IV Push once  dextrose 50% Injectable 12.5 Gram(s) IV Push once  diltiazem    milliGRAM(s) Oral every 24 hours  glucagon  Injectable 1 milliGRAM(s) IntraMuscular once  influenza  Vaccine (HIGH DOSE) 0.7 milliLiter(s) IntraMuscular once  insulin glargine Injectable (LANTUS) 12 Unit(s) SubCutaneous at bedtime  insulin lispro (ADMELOG) corrective regimen sliding scale   SubCutaneous Before meals and at bedtime  levothyroxine 75 MICROGram(s) Oral daily  metoprolol succinate ER 25 milliGRAM(s) Oral every 24 hours    MEDICATIONS  (PRN):  dextrose Oral Gel 15 Gram(s) Oral once PRN Blood Glucose LESS THAN 70 milliGRAM(s)/deciliter  traMADol 25 milliGRAM(s) Oral every 6 hours PRN Severe Pain (7 - 10)      Allergies    No Known Allergies    Intolerances          Vital Signs Last 24 Hrs  T(C): 37.2 (18 Oct 2022 04:41), Max: 37.2 (18 Oct 2022 04:41)  T(F): 98.9 (18 Oct 2022 04:41), Max: 98.9 (18 Oct 2022 04:41)  HR: 77 (18 Oct 2022 05:43) (77 - 103)  BP: 114/59 (18 Oct 2022 05:43) (114/59 - 176/78)  BP(mean): 96 (17 Oct 2022 19:05) (96 - 106)  RR: 16 (18 Oct 2022 05:43) (15 - 19)  SpO2: 93% (18 Oct 2022 05:43) (93% - 100%)    Parameters below as of 18 Oct 2022 05:43  Patient On (Oxygen Delivery Method): room air      CAPILLARY BLOOD GLUCOSE      POCT Blood Glucose.: 57 mg/dL (18 Oct 2022 06:50)  POCT Blood Glucose.: 56 mg/dL (18 Oct 2022 06:32)  POCT Blood Glucose.: 63 mg/dL (18 Oct 2022 06:31)  POCT Blood Glucose.: 212 mg/dL (17 Oct 2022 21:52)  POCT Blood Glucose.: 125 mg/dL (17 Oct 2022 16:48)  POCT Blood Glucose.: 287 mg/dL (17 Oct 2022 11:59)      10-17 @ 07:01  -  10-18 @ 07:00  --------------------------------------------------------  IN: 200 mL / OUT: 1106 mL / NET: -906 mL        Physical Exam:    Daily Height in cm: 142.24 (17 Oct 2022 10:14)    Daily   General:  Well appearing, NAD  CV:  RRR  Lungs:  nonlabored breathing  Abdomen:  Soft, non-tender, no distended  Extremities:    Vascular:  Neuro:  AAOx3    LABS:                        9.0    8.80  )-----------( 251      ( 18 Oct 2022 05:30 )             26.5     10-18    136  |  104  |  x   ----------------------------<  65<L>  3.7   |  26  |  0.68    Ca    8.6      18 Oct 2022 05:30  Phos  3.1     10-18  Mg     1.8     10-18    TPro  6.2  /  Alb  3.3  /  TBili  0.3  /  DBili  x   /  AST  14  /  ALT  8<L>  /  AlkPhos  85  10-17    PT/INR - ( 16 Oct 2022 23:57 )   PT: 10.8 sec;   INR: 0.91          PTT - ( 16 Oct 2022 23:57 )  PTT:26.8 sec          ---------------------------------------------------------------------------  PLEASE CHECK WHEN PRESENT:  [  ] Heart Failure  [  ] Acute  [  ] Acute on Chronic  [  ] Chronic  -------------------------------------------------------------------  [  ]Diastolic [HFpEF]  [  ]Systolic [HFrEF]  [  ]Combined [HFpEF & HFrEF]  [  ] afib  [  ] hypertensive heart disease  [  ]Other:  -------------------------------------------------------------------  [ ] Respiratory failure  [ ] Acute cor pulmonale  [ ] Asthma/COPD Exacerbation  [ ] Pleural effusion  [ ] Aspiration pneumonia  -------------------------------------------------------------------  [  ]JAEL  [  ]ATN  [  ]Reneal Medullary Necrosis  [  ]Renal Cortical Necrosis  [  ]Other Pathological Lesions:    [  ]CKD 1  [  ]CKD 2  [  ]CKD 3  [  ]CKD 4  [  ]CKD 5  [  ]Other  -------------------------------------------------------------------  [  ]Diabetes  [  ] Diabetic PVD Ulcer  [  ] Neuropathic ulcer to DM  [  ] Diabetes with Nephropathy  [  ] Osteomyelitis due to diabetes  --------------------------------------------------------------------  [  ]Malnutrition: See Nutrition Note  [  ]Cachexia  [  ]Other:   [  ]Supplement Ordered:  [  ]Morbid Obesity (BMI >=40]  ---------------------------------------------------------------------  [ ] Sepsis/severe sepsis/septic shock  [ ] UTI  [ ] Pneumonia  -----------------------------------------------------------------------  [ ] Acidosis/alkalosis  [ ] Fluid overload  [ ] Hypokalemia  [ ] Hyperkalemia  [ ] Hypomagnesemia  [ ] Hypophosphatemia  [ ] Hyperphosphatemia  ------------------------------------------------------------------------  [ ] Acute blood loss anemia  [ ] Post op blood loss anemia  [ ] Iron deficiency anemia  [ ] Anemia due to chronic disease  [ ] Hypercoagulable state  ----------------------------------------------------------------------  [ ] Cerebral infarction  [ ] Transient ischemia attack  [ ] Encephalopathy            Assessment:  · Assessment	  A/P: 88 y/o F, Hx of HTN, DM, Hypothroidism and cardiac stent.  Pt s/p RLE angiogram through R ankle (per daughter) on 10/14.  Pt had L leg angiogram 1mth prior.   Pt scheduled to have repeat procedure on 10/28.     Pt presents to [facility] c/o pain and ecchymosis to R abdomen, flank, hip and thigh.  Pt's daughter states the onset of symptoms was approx 24hrs after procedure. CT shows Abdominal wall hematoma right flank and lower quadrant approximally 7 x 1.7 by 18cm.     Vascular/PAD:  - admit to Dr. Salas   - s/p angiogram       HTN/HLD:  resume metoprolol   resume diltiazem     CAD/CHF:   ASA  plavix     DM:  lantus 12 QHS, ISS   FSG was low this morning, given some dextrose 50%    Anemia:   - trend hgb   - 1PRBC now   - f/u am labs     Diet:   consistent carbs     Activity:   as adi     DVTPPx:   holding     Dispo: home 
POST-OPERATIVE NOTE    Procedure: hematoma    Diagnosis/Indication: diagnostic angio    Surgeon: Josue    S: Pt has no complaints. Denies CP, SOB, N/V. Pain controlled with medication.    O:  T(C): --  T(F): --  HR: --  BP: --  RR: --  SpO2: --  Wt(kg): --                        7.1    9.52  )-----------( 239      ( 17 Oct 2022 05:31 )             20.8     10-17    134<L>  |  100  |  10  ----------------------------<  355<H>  3.9   |  24  |  0.55    Ca    8.6      17 Oct 2022 05:31    TPro  6.2  /  Alb  3.3  /  TBili  0.3  /  DBili  x   /  AST  14  /  ALT  8<L>  /  AlkPhos  85  10-17      Gen: NAD, resting comfortably in bed  C/V: NSR  Pulm: Nonlabored breathing, no respiratory distress  Abd: soft, NT/ND  Extrem: Left groin- drsg c/d/i +tri DP/PT      A/P: 87yFemale s/p above procedure  Diet: CC  IVF: ns@50  Pain/nausea control  am labs
Pre-op Diagnosis: abd wall hematoma   Procedure: s/p RLE angiogram possible stent, embolization   Surgeon: Dr. Salas     Consent: in chart                           7.1    9.52  )-----------( 239      ( 17 Oct 2022 05:31 )             20.8     10-17    134<L>  |  100  |  10  ----------------------------<  355<H>  3.9   |  24  |  0.55    Ca    8.6      17 Oct 2022 05:31    TPro  6.2  /  Alb  3.3  /  TBili  0.3  /  DBili  x   /  AST  14  /  ALT  8<L>  /  AlkPhos  85  10-17    PT/INR - ( 16 Oct 2022 23:57 )   PT: 10.8 sec;   INR: 0.91          PTT - ( 16 Oct 2022 23:57 )  PTT:26.8 sec      Type & Screen: 10/17/2022  (*With most recent within 72hrs of OR)  CXR: 10/17/2022  EKG: 10/17/20      COVID status/date: [ x]Negative/Date: 10/16/2022  [ ]Positive/Date:     *With most recent within 48hrs of OR    Is patient on ACE/ARB? [x ]No [ ]Yes   *If yes, please hold any ACE/ARB the day of surgery    Is patient on Lantus at bedtime?  [ ]No [x ]Yes   *If yes, please half the dose the night before OR since patient will be NPO    Does patient have a contrast allergy? [ x]No [ ]Yes  *If yes, please pre-medicate per protocol    Is patient on anticoagulation? [ ]No [x ] Yes  *If yes, please discuss with team when to hold it    Is the patient Female and <54yo [ x]No [ ] Yes  If yes, pregnancy test must be documented in the chart    Is patient on dialysis? [ x]No [ ]Yes  *If yes, please obtain all labs including K level EARLY the day of surgery   *Also, will NOT require IVF past midnight    A/P: 87yFemale pre-op for above procedure  1. NPO   2. receiving 1 PRBC.   3.  NS 50cc/hr

## 2022-10-18 NOTE — DISCHARGE NOTE PROVIDER - CARE PROVIDER_API CALL
Isidro Salas)  Surgery; Vascular Surgery  [address], [place] [address]  Phone: [phone]  Fax: [phone]  Follow Up Time:    Chacho Hodge)  [facility] [place] [address]  Phone: [phone]  Fax: [phone]  Follow Up Time: 1 week

## 2022-10-20 ENCOUNTER — APPOINTMENT (OUTPATIENT)
Dept: UROLOGY | Facility: CLINIC | Age: 87
End: 2022-10-20

## 2022-10-20 VITALS
DIASTOLIC BLOOD PRESSURE: 84 MMHG | HEIGHT: 56 IN | TEMPERATURE: 98 F | SYSTOLIC BLOOD PRESSURE: 172 MMHG | OXYGEN SATURATION: 95 % | WEIGHT: 95 LBS | HEART RATE: 95 BPM | BODY MASS INDEX: 21.37 KG/M2

## 2022-10-20 DIAGNOSIS — R33.9 RETENTION OF URINE, UNSPECIFIED: ICD-10-CM

## 2022-10-20 LAB
BILIRUB UR QL STRIP: NORMAL
CLARITY UR: CLEAR
COLLECTION METHOD: NORMAL
GLUCOSE UR-MCNC: NORMAL
HCG UR QL: 0.2 EU/DL
HGB UR QL STRIP.AUTO: NORMAL
KETONES UR-MCNC: NORMAL
LEUKOCYTE ESTERASE UR QL STRIP: NORMAL
NITRITE UR QL STRIP: NORMAL
PH UR STRIP: 5
PROT UR STRIP-MCNC: NORMAL
SP GR UR STRIP: 1.02

## 2022-10-20 PROCEDURE — 81003 URINALYSIS AUTO W/O SCOPE: CPT | Mod: QW

## 2022-10-20 PROCEDURE — 99204 OFFICE O/P NEW MOD 45 MIN: CPT

## 2022-10-21 LAB
ANION GAP SERPL CALC-SCNC: 10 MMOL/L
BUN SERPL-MCNC: 18 MG/DL
CALCIUM SERPL-MCNC: 9.1 MG/DL
CHLORIDE SERPL-SCNC: 97 MMOL/L
CO2 SERPL-SCNC: 25 MMOL/L
CREAT SERPL-MCNC: 0.52 MG/DL
EGFR: 90 ML/MIN/1.73M2
ESTIMATED AVERAGE GLUCOSE: 240 MG/DL
GLUCOSE SERPL-MCNC: 329 MG/DL
HBA1C MFR BLD HPLC: 10 %
POTASSIUM SERPL-SCNC: 4.4 MMOL/L
SODIUM SERPL-SCNC: 132 MMOL/L

## 2022-10-24 NOTE — HISTORY OF PRESENT ILLNESS
[FreeTextEntry1] : Language: Lao\par Date of First visit: 10/20/2022\par Accompanied by: Piero Warner\[facility] Contact info: ***\par Referring Provider/PCP: Dr. Diana Duval\par [facility] fax: [phone]\par \par \par CC/ Problem List:\[facility] \par ===============================================================================\par FIRST VISIT:\par The patient is a 87 year female who first presents 10/20/2022 for urinary retention (high PVRs)\par \par she has no complaints about her urinating. Her high PVRs were detected during a recent vascular admission. She does have some urgency. She has nocturia x1. She denies straining to void. She denies incontinence. She does not feel anything is falling out of her vagina. She is , all . She has no h/o  or pelvic surgery.\par \par She denies recent UTI's. She has occasional constipation. She has a BM daily. Rarely every two days. Her DM is poorly controlled. Her PCP is helping her manage this. \par \par Please note most history is from daughter due to mother's severe [place], language barrier, and suspected memory loss. \par \par -------------------------------------------------------------------------------------------\par INTERVAL VISITS:\par \par \par ===============================================================================\par \par PMH: DM, SOB, Thyroid issues, Vascular issues\par PSH: Gallbladder, vascular surgery at OSH with hematoma\par POBH: (if applicable) \par FH: \par \par ALL: NKDA\par MEDS: Atorvastatin, ASA, Cilostazol, Plavix, Diltiazem, Lantus, Levothyroxine, Metformin, Metoprolol, Ranolazine, Telmisartan, MVI, Vegetable vitamin\par SOC: Denies Tob, Rare EtOH, Denies drugs\par \par \par ROS: Review of Systems is as per HPI unless otherwise denoted below\par \par \par ===============================================================================\par DATA: \par \par LABS:-------------------------------------------------------------------------------------------------------------------\par 10/20/2022: UA dip >1000 Glucose, Trace ketones, >300 Protein\par \par \par RADS:-------------------------------------------------------------------------------------------------------------------\par \par \par \par PATHOLOGY/CYTOLOGY:-------------------------------------------------------------------------------------------\par \par \par \par VOIDING STUDIES: ----------------------------------------------------------------------------------------------------\par 10/20/2022; PVR 263cc\par \par \par STONE STUDIES: (Analysis/LLSA)----------------------------------------------------------------------------------\par \par \par \par PROCEDURES: -----------------------------------------------------------------------------------------------\par \par \par \par \par ===============================================================================\par \par PHYSICAL EXAM:\par \par GEN: AAOx3, NAD, Habitus: Frail\par \par BARRIERS to CARE: [place], Language, suspect some memory loss\par \par PSYCH: Appropriate Behavior, Affect Congruent\par \par HEENT: AT/NC Trachea midline. EOMI.\par \par Lungs: No labored breathing.\par \par NEURO: + Movement, all 4 extremities grossly intact without deficits. No tremors.\par \par SKIN: Warm dry. No visible rashes or ulcers\par \par GAIT: Gait antalgic, Stability guarded\par \par ABD: Soft, NT ND. No rebound, No guarding. No masses.\par \par No suprapubic tenderness,\par \par GROIN: Marked ecchymosis arising from groin from prior vascular procedures\par \par \par \par =======================================================================================\par \par \par \par ASSESSMENT and PLAN\par \par The patient is a 87 year female with a history of the following:\par \par 1. High PVR's in an otherwise asymptomatic patient with poorly controlled DM\par \par I suspect her issues are largely due to her DM\par We need to make sure she does have hydro.\par \par I have sent her for a RBUS and did an A1c and BMP. She knows I do not specialize in voiding issues and she wants a female provider so I have asked her to f/u with Dr. Alejandro\par \par -----------------------------------------------------------------------------------------------------\par LABS/TESTS Ordered: A1c, BMP, RBUS\par Meds Ordered:\par Follow up: after the above with Flavia\par -----------------------------------------------------------------------------------------------------\par \par Per the pt's request, her daughter translated\par \par The total amount of time I have personally spent preparing for this visit, reviewing the patient's test results, obtaining external history, ordering tests/medications, documenting clinical information, communicating with and counseling the patient/family and/or caregiver(s), and spent face to face with the patient explaining the above was  45 minutes.\par \par Thank you for allowing me to assist in the care of your patient. Should you have any questions please do not hesitate to reach out to me.\par \par \par Angela Lim MD\par Associate \[facility] Department of Urology\par [facility] Phone: [phone]\[facility] Fax: [phone]\[facility] \par [address] [place] [address]\[facility] [address]\[facility]

## 2022-10-27 DIAGNOSIS — E78.5 HYPERLIPIDEMIA, UNSPECIFIED: ICD-10-CM

## 2022-10-27 DIAGNOSIS — E03.9 HYPOTHYROIDISM, UNSPECIFIED: ICD-10-CM

## 2022-10-27 DIAGNOSIS — I10 ESSENTIAL (PRIMARY) HYPERTENSION: ICD-10-CM

## 2022-10-27 DIAGNOSIS — Z79.84 LONG TERM (CURRENT) USE OF ORAL HYPOGLYCEMIC DRUGS: ICD-10-CM

## 2022-10-27 DIAGNOSIS — I25.10 ATHEROSCLEROTIC HEART DISEASE OF NATIVE CORONARY ARTERY WITHOUT ANGINA PECTORIS: ICD-10-CM

## 2022-10-27 DIAGNOSIS — L76.32 POSTPROCEDURAL HEMATOMA OF SKIN AND SUBCUTANEOUS TISSUE FOLLOWING OTHER PROCEDURE: ICD-10-CM

## 2022-10-27 DIAGNOSIS — R33.8 OTHER RETENTION OF URINE: ICD-10-CM

## 2022-10-27 DIAGNOSIS — E11.51 TYPE 2 DIABETES MELLITUS WITH DIABETIC PERIPHERAL ANGIOPATHY WITHOUT GANGRENE: ICD-10-CM

## 2022-10-27 DIAGNOSIS — I70.299 OTHER ATHEROSCLEROSIS OF NATIVE ARTERIES OF EXTREMITIES, UNSPECIFIED EXTREMITY: ICD-10-CM

## 2022-10-27 DIAGNOSIS — Z79.4 LONG TERM (CURRENT) USE OF INSULIN: ICD-10-CM

## 2022-10-27 DIAGNOSIS — Z95.5 PRESENCE OF CORONARY ANGIOPLASTY IMPLANT AND GRAFT: ICD-10-CM

## 2022-10-27 DIAGNOSIS — D64.9 ANEMIA, UNSPECIFIED: ICD-10-CM

## 2022-11-01 ENCOUNTER — LABORATORY RESULT (OUTPATIENT)
Age: 87
End: 2022-11-01

## 2022-11-01 ENCOUNTER — APPOINTMENT (OUTPATIENT)
Dept: VASCULAR SURGERY | Facility: CLINIC | Age: 87
End: 2022-11-01

## 2022-11-01 VITALS
SYSTOLIC BLOOD PRESSURE: 152 MMHG | BODY MASS INDEX: 21.37 KG/M2 | HEIGHT: 56 IN | DIASTOLIC BLOOD PRESSURE: 80 MMHG | WEIGHT: 95 LBS | HEART RATE: 97 BPM

## 2022-11-01 DIAGNOSIS — N39.0 URINARY TRACT INFECTION, SITE NOT SPECIFIED: ICD-10-CM

## 2022-11-01 PROCEDURE — 99024 POSTOP FOLLOW-UP VISIT: CPT

## 2022-11-02 PROBLEM — N39.0 ACUTE UTI (URINARY TRACT INFECTION): Status: ACTIVE | Noted: 2022-11-02

## 2022-11-02 RX ORDER — SULFAMETHOXAZOLE AND TRIMETHOPRIM 800; 160 MG/1; MG/1
800-160 TABLET ORAL
Qty: 14 | Refills: 0 | Status: COMPLETED | COMMUNITY
Start: 2022-06-30 | End: 2022-11-02

## 2022-11-02 RX ORDER — CIPROFLOXACIN HYDROCHLORIDE 250 MG/1
250 TABLET, FILM COATED ORAL
Qty: 10 | Refills: 0 | Status: COMPLETED | COMMUNITY
Start: 2022-09-29 | End: 2022-11-02

## 2022-11-02 RX ORDER — CEPHALEXIN 500 MG/1
500 CAPSULE ORAL
Qty: 21 | Refills: 0 | Status: COMPLETED | COMMUNITY
Start: 2022-06-30 | End: 2022-11-02

## 2022-11-02 RX ORDER — KETOCONAZOLE 20.5 MG/ML
2 SHAMPOO, SUSPENSION TOPICAL
Qty: 120 | Refills: 0 | Status: COMPLETED | COMMUNITY
Start: 2022-06-13 | End: 2022-11-02

## 2022-11-07 LAB
APPEARANCE: ABNORMAL
BASOPHILS # BLD AUTO: 0.09 K/UL
BASOPHILS NFR BLD AUTO: 0.7 %
BILIRUBIN URINE: NEGATIVE
BLOOD URINE: ABNORMAL
COLOR: YELLOW
EOSINOPHIL # BLD AUTO: 0.1 K/UL
EOSINOPHIL NFR BLD AUTO: 0.8 %
GLUCOSE QUALITATIVE U: ABNORMAL
HCT VFR BLD CALC: 34.2 %
HGB BLD-MCNC: 11 G/DL
IMM GRANULOCYTES NFR BLD AUTO: 0.4 %
KETONES URINE: NEGATIVE
LEUKOCYTE ESTERASE URINE: ABNORMAL
LYMPHOCYTES # BLD AUTO: 2.3 K/UL
LYMPHOCYTES NFR BLD AUTO: 17.6 %
MAN DIFF?: NORMAL
MCHC RBC-ENTMCNC: 31.6 PG
MCHC RBC-ENTMCNC: 32.2 GM/DL
MCV RBC AUTO: 98.3 FL
MONOCYTES # BLD AUTO: 0.96 K/UL
MONOCYTES NFR BLD AUTO: 7.3 %
NEUTROPHILS # BLD AUTO: 9.57 K/UL
NEUTROPHILS NFR BLD AUTO: 73.2 %
NITRITE URINE: NEGATIVE
PH URINE: 6
PLATELET # BLD AUTO: 300 K/UL
PROTEIN URINE: ABNORMAL
RBC # BLD: 3.48 M/UL
RBC # FLD: 14.7 %
SPECIFIC GRAVITY URINE: 1.02
UROBILINOGEN URINE: ABNORMAL
WBC # FLD AUTO: 13.07 K/UL

## 2022-11-10 NOTE — ADDENDUM
[FreeTextEntry1] : This note was written by Melvin Burgess, acting as a scribe for Dr. Isidro Salas.  I, Dr. Isidro Salas, have read and attest that all the information, medical decision-making, and discharge instructions within are true and accurate.\par \par I, Dr. Isidro Salas, personally performed the evaluation and management (E/M) services for this new patient.  That E/M includes conducting the initial examination, assessing all conditions, and establishing the plan of care.  Today, my ACP, Melvin Burgess, was here to observe my evaluation and management services for this patient to be followed going forward.

## 2022-11-10 NOTE — REASON FOR VISIT
[Family Member] : family member [de-identified] : Diagnostic RLE angiogram [de-identified] : 10/17/2022 [de-identified] : 15 [de-identified] : 2 [de-identified] : 87yoF recently underwent a catheter-based procedure in another hospital who presented to the [facility] ER w/sudden R groin pain and diffuse ecchymosis and drop in HCt.  Suggestion of pseudoaneurysm of R femoral artery noted on CT, but at the time of the angio, the defect had resolved and no active bleeding was noted.

## 2022-11-10 NOTE — DISCUSSION/SUMMARY
[FreeTextEntry1] : 87yoF recently underwent a catheter-based procedure in another hospital who presented to the [facility] ER w/sudden R groin pain and diffuse ecchymosis and drop in HCt.  Suggestion of pseudoaneurysm of R femoral artery noted on CT, but at the time of the angio, the defect had resolved and no active bleeding was noted.  Pt has since reported complete resolution of her pain.\par \par On exam, all LE pulses palpable w/no tenderness in the groin and legs are well-perfused.  Reassured pt that there is no evidence of residual pseudoaneurysm, and she may return to all regular activity to recondition her leg.  Pt will RTO PRN.\par \par ADDENDUM: U/A and CBC results confirmed UTI w/leukocytosis, pt started on course of Bactrim DS (1 tab PO q12h x 5d), will f/u w/pt in 48h to reevaluate symptoms.  Results conveyed by phone to pt's daughter.

## 2022-11-10 NOTE — PHYSICAL EXAM
[Normal Thyroid] : the thyroid was normal [Normal Breath Sounds] : Normal breath sounds [Respiratory Effort] : normal respiratory effort [Normal Heart Sounds] : normal heart sounds [Normal Rate and Rhythm] : normal rate and rhythm [2+] : left 2+ [No HSM] : no hepatosplenomegaly [No Rash or Lesion] : No rash or lesion [Alert] : alert [Calm] : calm [JVD] : no jugular venous distention  [Carotid Bruits] : no carotid bruits [Right Carotid Bruit] : no bruit heard over the right carotid [Left Carotid Bruit] : no bruit heard over the left carotid [Varicose Veins Of Lower Extremities] : not present [Ankle Swelling (On Exam)] : not present [] : not present [Abdomen Masses] : No abdominal masses [Abdomen Tenderness] : ~T ~M No abdominal tenderness [Purpura] : no purpura  [Petechiae] : no petechiae [Skin Ulcer] : no ulcer [Skin Induration] : no induration [de-identified] : Well-nourished, NAD [de-identified] : NC/AT, anicteric [de-identified] : FROM throughout, strength 5/5x4

## 2022-11-16 ENCOUNTER — APPOINTMENT (OUTPATIENT)
Dept: UROLOGY | Facility: CLINIC | Age: 87
End: 2022-11-16

## 2022-11-20 ENCOUNTER — INPATIENT (INPATIENT)
Facility: HOSPITAL | Age: 87
LOS: 2 days | Discharge: HOME | DRG: 637 | End: 2022-11-23
Attending: INTERNAL MEDICINE | Admitting: INTERNAL MEDICINE
Payer: MEDICARE

## 2022-11-20 VITALS
DIASTOLIC BLOOD PRESSURE: 83 MMHG | HEIGHT: 56 IN | RESPIRATION RATE: 18 BRPM | TEMPERATURE: 98 F | WEIGHT: 82.89 LBS | SYSTOLIC BLOOD PRESSURE: 165 MMHG | OXYGEN SATURATION: 98 % | HEART RATE: 106 BPM

## 2022-11-20 DIAGNOSIS — Z29.9 ENCOUNTER FOR PROPHYLACTIC MEASURES, UNSPECIFIED: ICD-10-CM

## 2022-11-20 DIAGNOSIS — R00.0 TACHYCARDIA, UNSPECIFIED: ICD-10-CM

## 2022-11-20 DIAGNOSIS — R73.9 HYPERGLYCEMIA, UNSPECIFIED: ICD-10-CM

## 2022-11-20 DIAGNOSIS — I10 ESSENTIAL (PRIMARY) HYPERTENSION: ICD-10-CM

## 2022-11-20 DIAGNOSIS — E03.9 HYPOTHYROIDISM, UNSPECIFIED: ICD-10-CM

## 2022-11-20 DIAGNOSIS — R33.9 RETENTION OF URINE, UNSPECIFIED: ICD-10-CM

## 2022-11-20 DIAGNOSIS — N39.0 URINARY TRACT INFECTION, SITE NOT SPECIFIED: ICD-10-CM

## 2022-11-20 DIAGNOSIS — I25.10 ATHEROSCLEROTIC HEART DISEASE OF NATIVE CORONARY ARTERY WITHOUT ANGINA PECTORIS: ICD-10-CM

## 2022-11-20 DIAGNOSIS — I73.9 PERIPHERAL VASCULAR DISEASE, UNSPECIFIED: ICD-10-CM

## 2022-11-20 DIAGNOSIS — G93.40 ENCEPHALOPATHY, UNSPECIFIED: ICD-10-CM

## 2022-11-20 LAB
ALBUMIN SERPL ELPH-MCNC: 3.2 G/DL — LOW (ref 3.3–5)
ALP SERPL-CCNC: 144 U/L — HIGH (ref 40–120)
ALT FLD-CCNC: 15 U/L — SIGNIFICANT CHANGE UP (ref 10–45)
ANION GAP SERPL CALC-SCNC: 10 MMOL/L — SIGNIFICANT CHANGE UP (ref 5–17)
ANION GAP SERPL CALC-SCNC: 16 MMOL/L — SIGNIFICANT CHANGE UP (ref 5–17)
APPEARANCE UR: CLEAR — SIGNIFICANT CHANGE UP
APTT BLD: 28.1 SEC — SIGNIFICANT CHANGE UP (ref 27.5–35.5)
AST SERPL-CCNC: 24 U/L — SIGNIFICANT CHANGE UP (ref 10–40)
B-OH-BUTYR SERPL-SCNC: 0 MMOL/L — SIGNIFICANT CHANGE UP
B-OH-BUTYR SERPL-SCNC: 4.4 MMOL/L — HIGH
BACTERIA # UR AUTO: PRESENT /HPF
BASE EXCESS BLDV CALC-SCNC: -3.2 MMOL/L — LOW (ref -2–3)
BASOPHILS # BLD AUTO: 0.07 K/UL — SIGNIFICANT CHANGE UP (ref 0–0.2)
BASOPHILS NFR BLD AUTO: 0.7 % — SIGNIFICANT CHANGE UP (ref 0–2)
BILIRUB SERPL-MCNC: 0.2 MG/DL — SIGNIFICANT CHANGE UP (ref 0.2–1.2)
BILIRUB UR-MCNC: NEGATIVE — SIGNIFICANT CHANGE UP
BUN SERPL-MCNC: 23 MG/DL — SIGNIFICANT CHANGE UP (ref 7–23)
BUN SERPL-MCNC: 25 MG/DL — HIGH (ref 7–23)
CA-I SERPL-SCNC: 1 MMOL/L — LOW (ref 1.15–1.33)
CALCIUM SERPL-MCNC: 8.6 MG/DL — SIGNIFICANT CHANGE UP (ref 8.4–10.5)
CALCIUM SERPL-MCNC: 9.9 MG/DL — SIGNIFICANT CHANGE UP (ref 8.4–10.5)
CHLORIDE SERPL-SCNC: 100 MMOL/L — SIGNIFICANT CHANGE UP (ref 96–108)
CHLORIDE SERPL-SCNC: 89 MMOL/L — LOW (ref 96–108)
CO2 BLDV-SCNC: 24 MMOL/L — SIGNIFICANT CHANGE UP (ref 22–26)
CO2 SERPL-SCNC: 22 MMOL/L — SIGNIFICANT CHANGE UP (ref 22–31)
CO2 SERPL-SCNC: 23 MMOL/L — SIGNIFICANT CHANGE UP (ref 22–31)
COLOR SPEC: YELLOW — SIGNIFICANT CHANGE UP
CREAT SERPL-MCNC: 0.61 MG/DL — SIGNIFICANT CHANGE UP (ref 0.5–1.3)
CREAT SERPL-MCNC: 0.67 MG/DL — SIGNIFICANT CHANGE UP (ref 0.5–1.3)
DIFF PNL FLD: ABNORMAL
EGFR: 85 ML/MIN/1.73M2 — SIGNIFICANT CHANGE UP
EGFR: 86 ML/MIN/1.73M2 — SIGNIFICANT CHANGE UP
EOSINOPHIL # BLD AUTO: 0.07 K/UL — SIGNIFICANT CHANGE UP (ref 0–0.5)
EOSINOPHIL NFR BLD AUTO: 0.7 % — SIGNIFICANT CHANGE UP (ref 0–6)
EPI CELLS # UR: SIGNIFICANT CHANGE UP /HPF (ref 0–5)
GAS PNL BLDV: 116 MMOL/L — CRITICAL LOW (ref 136–145)
GAS PNL BLDV: SIGNIFICANT CHANGE UP
GLUCOSE BLDC GLUCOMTR-MCNC: 268 MG/DL — HIGH (ref 70–99)
GLUCOSE BLDC GLUCOMTR-MCNC: 304 MG/DL — HIGH (ref 70–99)
GLUCOSE SERPL-MCNC: 292 MG/DL — HIGH (ref 70–99)
GLUCOSE SERPL-MCNC: 494 MG/DL — CRITICAL HIGH (ref 70–99)
GLUCOSE UR QL: >=1000
HCO3 BLDV-SCNC: 23 MMOL/L — SIGNIFICANT CHANGE UP (ref 22–29)
HCT VFR BLD CALC: 33.7 % — LOW (ref 34.5–45)
HGB BLD-MCNC: 11.4 G/DL — LOW (ref 11.5–15.5)
IMM GRANULOCYTES NFR BLD AUTO: 1.1 % — HIGH (ref 0–0.9)
INR BLD: 0.98 — SIGNIFICANT CHANGE UP (ref 0.88–1.16)
KETONES UR-MCNC: >=80 MG/DL
LACTATE SERPL-SCNC: 1.6 MMOL/L — SIGNIFICANT CHANGE UP (ref 0.5–2)
LEUKOCYTE ESTERASE UR-ACNC: ABNORMAL
LYMPHOCYTES # BLD AUTO: 1.68 K/UL — SIGNIFICANT CHANGE UP (ref 1–3.3)
LYMPHOCYTES # BLD AUTO: 16.5 % — SIGNIFICANT CHANGE UP (ref 13–44)
MCHC RBC-ENTMCNC: 31.1 PG — SIGNIFICANT CHANGE UP (ref 27–34)
MCHC RBC-ENTMCNC: 33.8 GM/DL — SIGNIFICANT CHANGE UP (ref 32–36)
MCV RBC AUTO: 92.1 FL — SIGNIFICANT CHANGE UP (ref 80–100)
MONOCYTES # BLD AUTO: 0.78 K/UL — SIGNIFICANT CHANGE UP (ref 0–0.9)
MONOCYTES NFR BLD AUTO: 7.6 % — SIGNIFICANT CHANGE UP (ref 2–14)
NEUTROPHILS # BLD AUTO: 7.49 K/UL — HIGH (ref 1.8–7.4)
NEUTROPHILS NFR BLD AUTO: 73.4 % — SIGNIFICANT CHANGE UP (ref 43–77)
NITRITE UR-MCNC: NEGATIVE — SIGNIFICANT CHANGE UP
NRBC # BLD: 0 /100 WBCS — SIGNIFICANT CHANGE UP (ref 0–0)
PCO2 BLDV: 43 MMHG — HIGH (ref 39–42)
PH BLDV: 7.33 — SIGNIFICANT CHANGE UP (ref 7.32–7.43)
PH UR: 6 — SIGNIFICANT CHANGE UP (ref 5–8)
PLATELET # BLD AUTO: 476 K/UL — HIGH (ref 150–400)
PO2 BLDV: <33 MMHG — LOW (ref 25–45)
POTASSIUM BLDV-SCNC: >10 MMOL/L — CRITICAL HIGH (ref 3.5–5.1)
POTASSIUM SERPL-MCNC: 4.1 MMOL/L — SIGNIFICANT CHANGE UP (ref 3.5–5.3)
POTASSIUM SERPL-MCNC: 5.4 MMOL/L — HIGH (ref 3.5–5.3)
POTASSIUM SERPL-SCNC: 4.1 MMOL/L — SIGNIFICANT CHANGE UP (ref 3.5–5.3)
POTASSIUM SERPL-SCNC: 5.4 MMOL/L — HIGH (ref 3.5–5.3)
PROT SERPL-MCNC: 8.2 G/DL — SIGNIFICANT CHANGE UP (ref 6–8.3)
PROT UR-MCNC: 100 MG/DL
PROTHROM AB SERPL-ACNC: 11.6 SEC — SIGNIFICANT CHANGE UP (ref 10.5–13.4)
RBC # BLD: 3.66 M/UL — LOW (ref 3.8–5.2)
RBC # FLD: 13.2 % — SIGNIFICANT CHANGE UP (ref 10.3–14.5)
RBC CASTS # UR COMP ASSIST: < 5 /HPF — SIGNIFICANT CHANGE UP
SAO2 % BLDV: 20.3 % — LOW (ref 67–88)
SARS-COV-2 RNA SPEC QL NAA+PROBE: NEGATIVE — SIGNIFICANT CHANGE UP
SODIUM SERPL-SCNC: 128 MMOL/L — LOW (ref 135–145)
SODIUM SERPL-SCNC: 132 MMOL/L — LOW (ref 135–145)
SP GR SPEC: 1.02 — SIGNIFICANT CHANGE UP (ref 1–1.03)
TSH SERPL-MCNC: 3.23 UIU/ML — SIGNIFICANT CHANGE UP (ref 0.27–4.2)
UROBILINOGEN FLD QL: 0.2 E.U./DL — SIGNIFICANT CHANGE UP
WBC # BLD: 10.2 K/UL — SIGNIFICANT CHANGE UP (ref 3.8–10.5)
WBC # FLD AUTO: 10.2 K/UL — SIGNIFICANT CHANGE UP (ref 3.8–10.5)
WBC UR QL: ABNORMAL /HPF

## 2022-11-20 PROCEDURE — 93010 ELECTROCARDIOGRAM REPORT: CPT

## 2022-11-20 PROCEDURE — 99223 1ST HOSP IP/OBS HIGH 75: CPT | Mod: GC

## 2022-11-20 PROCEDURE — 71045 X-RAY EXAM CHEST 1 VIEW: CPT | Mod: 26

## 2022-11-20 PROCEDURE — 99285 EMERGENCY DEPT VISIT HI MDM: CPT | Mod: 25

## 2022-11-20 RX ORDER — CEFTRIAXONE 500 MG/1
1000 INJECTION, POWDER, FOR SOLUTION INTRAMUSCULAR; INTRAVENOUS ONCE
Refills: 0 | Status: COMPLETED | OUTPATIENT
Start: 2022-11-20 | End: 2022-11-20

## 2022-11-20 RX ORDER — DEXTROSE 50 % IN WATER 50 %
25 SYRINGE (ML) INTRAVENOUS ONCE
Refills: 0 | Status: DISCONTINUED | OUTPATIENT
Start: 2022-11-20 | End: 2022-11-23

## 2022-11-20 RX ORDER — METOPROLOL TARTRATE 50 MG
25 TABLET ORAL ONCE
Refills: 0 | Status: COMPLETED | OUTPATIENT
Start: 2022-11-20 | End: 2022-11-20

## 2022-11-20 RX ORDER — SODIUM CHLORIDE 9 MG/ML
1000 INJECTION INTRAMUSCULAR; INTRAVENOUS; SUBCUTANEOUS ONCE
Refills: 0 | Status: COMPLETED | OUTPATIENT
Start: 2022-11-20 | End: 2022-11-20

## 2022-11-20 RX ORDER — INSULIN LISPRO 100/ML
VIAL (ML) SUBCUTANEOUS
Refills: 0 | Status: DISCONTINUED | OUTPATIENT
Start: 2022-11-20 | End: 2022-11-23

## 2022-11-20 RX ORDER — INSULIN HUMAN 100 [IU]/ML
6 INJECTION, SOLUTION SUBCUTANEOUS ONCE
Refills: 0 | Status: COMPLETED | OUTPATIENT
Start: 2022-11-20 | End: 2022-11-20

## 2022-11-20 RX ORDER — DILTIAZEM HCL 120 MG
120 CAPSULE, EXT RELEASE 24 HR ORAL ONCE
Refills: 0 | Status: COMPLETED | OUTPATIENT
Start: 2022-11-20 | End: 2022-11-20

## 2022-11-20 RX ORDER — INSULIN GLARGINE 100 [IU]/ML
12 INJECTION, SOLUTION SUBCUTANEOUS AT BEDTIME
Refills: 0 | Status: DISCONTINUED | OUTPATIENT
Start: 2022-11-20 | End: 2022-11-21

## 2022-11-20 RX ORDER — SODIUM CHLORIDE 9 MG/ML
1000 INJECTION, SOLUTION INTRAVENOUS
Refills: 0 | Status: DISCONTINUED | OUTPATIENT
Start: 2022-11-20 | End: 2022-11-23

## 2022-11-20 RX ORDER — LOSARTAN POTASSIUM 100 MG/1
50 TABLET, FILM COATED ORAL ONCE
Refills: 0 | Status: COMPLETED | OUTPATIENT
Start: 2022-11-20 | End: 2022-11-20

## 2022-11-20 RX ORDER — INSULIN LISPRO 100/ML
2 VIAL (ML) SUBCUTANEOUS
Refills: 0 | Status: DISCONTINUED | OUTPATIENT
Start: 2022-11-20 | End: 2022-11-22

## 2022-11-20 RX ORDER — GLUCAGON INJECTION, SOLUTION 0.5 MG/.1ML
1 INJECTION, SOLUTION SUBCUTANEOUS ONCE
Refills: 0 | Status: DISCONTINUED | OUTPATIENT
Start: 2022-11-20 | End: 2022-11-23

## 2022-11-20 RX ORDER — DEXTROSE 50 % IN WATER 50 %
15 SYRINGE (ML) INTRAVENOUS ONCE
Refills: 0 | Status: DISCONTINUED | OUTPATIENT
Start: 2022-11-20 | End: 2022-11-23

## 2022-11-20 RX ADMIN — SODIUM CHLORIDE 1000 MILLILITER(S): 9 INJECTION INTRAMUSCULAR; INTRAVENOUS; SUBCUTANEOUS at 15:48

## 2022-11-20 RX ADMIN — Medication 120 MILLIGRAM(S): at 21:44

## 2022-11-20 RX ADMIN — CEFTRIAXONE 100 MILLIGRAM(S): 500 INJECTION, POWDER, FOR SOLUTION INTRAMUSCULAR; INTRAVENOUS at 19:11

## 2022-11-20 RX ADMIN — INSULIN HUMAN 6 UNIT(S): 100 INJECTION, SOLUTION SUBCUTANEOUS at 17:01

## 2022-11-20 RX ADMIN — SODIUM CHLORIDE 1000 MILLILITER(S): 9 INJECTION INTRAMUSCULAR; INTRAVENOUS; SUBCUTANEOUS at 20:31

## 2022-11-20 RX ADMIN — SODIUM CHLORIDE 1000 MILLILITER(S): 9 INJECTION INTRAMUSCULAR; INTRAVENOUS; SUBCUTANEOUS at 17:00

## 2022-11-20 RX ADMIN — LOSARTAN POTASSIUM 50 MILLIGRAM(S): 100 TABLET, FILM COATED ORAL at 20:41

## 2022-11-20 RX ADMIN — CEFTRIAXONE 1000 MILLIGRAM(S): 500 INJECTION, POWDER, FOR SOLUTION INTRAMUSCULAR; INTRAVENOUS at 20:31

## 2022-11-20 RX ADMIN — SODIUM CHLORIDE 1000 MILLILITER(S): 9 INJECTION INTRAMUSCULAR; INTRAVENOUS; SUBCUTANEOUS at 19:48

## 2022-11-20 RX ADMIN — Medication 25 MILLIGRAM(S): at 20:42

## 2022-11-20 NOTE — H&P ADULT - PROBLEM SELECTOR PLAN 5
HTN in ED up to 194/73 but did not taking any of medications today which include telmisartan 40mg daily, toprol 25mg daily and diltiazem 120mg daily. After taking losartan and toprol in ED BP down to 160s/70s.  - c/w home medications

## 2022-11-20 NOTE — H&P ADULT - ATTENDING COMMENTS
87 year old female patient with history of PAD, diabetes, CAD/PAD s/p cardiac stenting, PAD, urinary retention presumed to secondary to diabetic neuropathy presented to [facility] ED with complaints of urinary frequency and urgency of 1 week duration associated with fatigue, low back pain and chills. Patient is type 2 diabetic takes Lantus and metformin and has had finger sticks at home around 500s in the past week. In the ED patient is found to be tachycardic to 106 to 118, hypertensive to 163/83 with normal temp and O2 saturation. Her labs are significant of hyperglycemia, hyponatremia positive ketones (beta hydroxy butyrate 4.4), her urinalysis is positive for ketones, WBC, negative for nitrites. Patient is admitted for management of hyperglycemia with ketosis and workup of urinary complaints.       1. Hyperglycemia  reported compliance with basal insulin at home   likely poor diabetic control   hypergylcemia is improving with regular insulin   start lantus home dose 12 U   sliding scale correction insulin at midnight and repeat premeal in the AM   endocrinology consult in the AM   diabetic diet   switch medication ins dextrose to NS to limit glucose intake     2. Urinary Frequency  unclear if active UTI present   nitrites negative, patient s/p antimicrobial therapy with cipro  empirically continue ceftriaxone 1000 gm q24 hours, if urine cx is negative will d/c  change ceftriaxone to be constituted in NS   likely etiology is hyperglycemia    3. History urianry retention  given hx of urinary retention and active urinary complaints will insert cassidy to monitor urine output

## 2022-11-20 NOTE — ED PROVIDER NOTE - CLINICAL SUMMARY MEDICAL DECISION MAKING FREE TEXT BOX
Impression: hx of HTN, DM, hypothyroidism and cardiac stent, s/p recent admission in Oct. for abdominal wall hematoma to R flank and RLQ, and s/p leg angiogram, now here for 1 week of urinary frequency/urgency and cloudy urine. No improvement despite taking PO Cipro. Also has cough and elevated glucose despite taking diabetes meds. Pt is afebrile and mildly hypotensive, otherwise VS are stable. EKG is nonischemic. PH is 7.33 on ABG. Potassium >10. Sodium noted to be 116. No signs of hyperkalemia on EKG. Lactate noted to be 1.6. Will check electrolytes on CMP. Will hydrate with fluid bolus and recheck insulin. Impression: hx of HTN, DM, hypothyroidism and cardiac stent, s/p recent admission in Oct. for abdominal wall hematoma to R flank and RLQ, and s/p leg angiogram, now here for 1 week of urinary frequency/urgency and cloudy urine. No improvement despite taking PO Cipro. Also has cough and elevated glucose despite taking diabetes meds. Pt is afebrile and mildly hypertensive, VS otherwise are stable. EKG is nonischemic. Labs notable for normal wbc, mild anemia, gluc 400s with neg ag, Na 128 (likely due to hyperglycemia), bhb mildly elev, wit normal lactate and no significant acidosis on vbg. Pt hydrated with ivf, given insulin with improvement of glucose and normalization of bhb. UA showing possible infection; given sx's of urinary urgency/ frequency, pt given dose of ceftriaxone. Will admit to hospitalist [facility] for continued iv abx and further management of hyperglycemia.

## 2022-11-20 NOTE — ED PROVIDER NOTE - OBJECTIVE STATEMENT
86 y/o F, PMHx of HTN, DM, Hypothyroidism and cardiac stent, s/p recent admission in Oct. for abdominal wall hematoma to R flank and RLQ, s/p leg angiogram, who presents to ED with urinary frequency, urgency, and cloudy urine x1 week. Pt denies any abdominal pain, flank pain, fevers, chills. and n/v. Pt was started on Cipro, now day 3 with no improvement of sxs. Pt has had decreased PO intake and has been coughing. Daughter also notes elevated glucose in the 500s despite taking Metformin and Basaglar insulin. Pt last took 300 mg Metformin this morning. Pt denies any chest pain or SOB. 86 y/o F, PMHx of HTN, DM, Hypothyroidism and cardiac stent, s/p recent admission in Oct. for abdominal wall hematoma to R flank and RLQ s/p leg angiogram, who presents to ED with urinary frequency, urgency, and cloudy urine x1 week. Pt denies any abdominal pain, flank pain, fevers, chills. and n/v. Pt was started on Cipro, now day 3 with no improvement of sxs. Pt has had decreased PO intake and has been coughing. Daughter notes elevated glucose in the 500s despite taking Metformin and Basaglar insulin. Pt last took 1000mg Metformin this morning. Pt denies any chest pain or SOB.

## 2022-11-20 NOTE — H&P ADULT - NSICDXPASTMEDICALHX_GEN_ALL_CORE_FT
PAST MEDICAL HISTORY:  CAD (coronary artery disease) s/p stent to LAD Jan 2022      Abdominal wall hematoma     DM (diabetes mellitus)     HTN (hypertension)     Hypothyroidism     PAD (peripheral artery disease)     S/P angiogram of extremity

## 2022-11-20 NOTE — H&P ADULT - PROBLEM SELECTOR PLAN 6
s/p stent in LAD at [facility] in January 2022. Takes daily nitroglycerin patch which she wears during the day. Also takes ranolazine ER 500mg BID, ASA 81mg daily, plavix 75mg daily, lipitor 40mg daily. Over 6 months since stent but may have other reasons for DAPT in setting of PAD.   - will c/w above medications

## 2022-11-20 NOTE — H&P ADULT - PROBLEM SELECTOR PLAN 2
Patient more fatigued w generalized weakness ikely in setting of infxn vs dehydration, hyperglycemia, could be multifactorial. Also has hx of hypothyroidism. No recent falls/trauma. No other major metabolic abnormalities besides hyperglycemia. No drug use or withdrawal, no hypoxia, ekg changes to suggest acute vascular issue, no focal deficits to suggest CVA  - f/u TSH  - treat hyperglycemia and infnxn (see plans for both)  - PT/OT Urinary symptoms w UA positive for pyuria though negative nitrites. Reasonable to treat for infection though symptoms likely caused by hyperglycemia.. Subjective L lumbar pain but no CVA tenderness on exam. No WBC, fever, or tachypnea. S/p CTX 1g in ED.   - c/w CTX 1g daily, in NS  - f/u urine and blood cx

## 2022-11-20 NOTE — H&P ADULT - NSHPSOCIALHISTORY_GEN_ALL_CORE
Tobacco use: never  EtOH use: occasionally on holidays  Illicit drug use: none    Living situation: Lives at home w 2 daughters, no trouble w ADLs, before this week walked without assistance but over pat 7 days requires assistance

## 2022-11-20 NOTE — H&P ADULT - PROBLEM SELECTOR PLAN 4
2/2 dehydration i/s/o uncontrolled hyperglycemia vs infection. Already improved after fluids and 1g CTX  - c/w management of infxn and hyperglycemia per above plan

## 2022-11-20 NOTE — H&P ADULT - NSHPLABSRESULTS_GEN_ALL_CORE
.  LABS:                         11.4   10.20 )-----------( 476      ( 2022 15:44 )             33.7         132<L>  |  100  |  23  ----------------------------<  292<H>  4.1   |  22  |  0.61    Ca    8.6      2022 19:10    TPro  8.2  /  Alb  3.2<L>  /  TBili  0.2  /  DBili  x   /  AST  24  /  ALT  15  /  AlkPhos  144<H>      PT/INR - ( 2022 15:44 )   PT: 11.6 sec;   INR: 0.98          PTT - ( 2022 15:44 )  PTT:28.1 sec  Urinalysis Basic - ( 2022 18:32 )    Color: Yellow / Appearance: Clear / S.020 / pH: x  Gluc: x / Ketone: >=80 mg/dL  / Bili: Negative / Urobili: 0.2 E.U./dL   Blood: x / Protein: 100 mg/dL / Nitrite: NEGATIVE   Leuk Esterase: Small / RBC: < 5 /HPF / WBC Many /HPF   Sq Epi: x / Non Sq Epi: 0-5 /HPF / Bacteria: Present /HPF        Lactate, Blood: 1.6 mmol/L ( @ 15:52)      RADIOLOGY, EKG & ADDITIONAL TESTS: Reviewed.

## 2022-11-20 NOTE — ED ADULT NURSE NOTE - OBJECTIVE STATEMENT
87 y.o F a&ox4 wheeled in from triage c.o urinary symptoms. x 1 week of urinary symptoms, started on abx on Thursday. reports uncontrolled blood sugars in the 500s. as per daughter at bedside who is her caregiver PT  has been more week at home. denies n/v/d, fevers, chills, SOB. EKG complete. Pt upgraded to MD lima. crisis labs sent. x 2 PIV placed. dec PO intake over the past week.

## 2022-11-20 NOTE — H&P ADULT - PROBLEM SELECTOR PLAN 10
Fluids: s/p 3L NS  Electrolytes: Mg>2, K>4  Prophylaxis: lovenox 30mg daily  Activity: Ambulate w assistance  GI: none  C: ALEKSEY  Dispo: Admit to [facility]

## 2022-11-20 NOTE — ED ADULT TRIAGE NOTE - CHIEF COMPLAINT QUOTE
Per daughter, pt having urinary sx x1 week. Finished PO Ciprofloxacin and still having cloudy urine associated with fatigue and weakness. Daughter also notes poorly controlled glucose recently.  on arrival.

## 2022-11-20 NOTE — H&P ADULT - PROBLEM SELECTOR PLAN 7
Urinary retention on last admission for which she followed up with urology outpatient and they suspected it is due to her DM, does not have cassidy. She has increased urgency unclear if still retaining. Could contribute to UTI  - f/u bladder scan Urinary retention on last admission for which she followed up with urology outpatient and they suspected it is due to her DM, does not have cassidy. She has increased urgency   - given increased frequency and urgency in setting of hyperglycemia and history of retention, will place cassidy catheter for accurate I/Os and prevention of retention

## 2022-11-20 NOTE — H&P ADULT - ASSESSMENT
86y/o F PMH PAD, DM, CAD w MARTINA to LAD Jan 2022, PAD, recent R flank hematoma after diagnostic RLE angiogram presents w urinary frequency of 1 week and admitted for management of UTI and hyperglycemia. 86y/o F PMH PAD, DM, CAD w MARTINA to LAD Jan 2022, PAD, recent R flank hematoma after diagnostic RLE angiogram presents w urinary frequency of 1 week and admitted for hyperglycemia.

## 2022-11-20 NOTE — ED PROVIDER NOTE - PHYSICAL EXAMINATION
VITAL SIGNS: I have reviewed nursing notes and confirm.  CONSTITUTIONAL: Elderly female, in no acute distress.   SKIN:  warm and dry, no acute rash.   HEAD:  normocephalic, atraumatic.  EYES: EOM intact; conjunctiva and sclera clear.  ENT: No nasal discharge; airway clear.   NECK: Supple; non tender.  CARD: S1, S2 normal; no murmurs, gallops, or rubs. Regular rate and rhythm.   RESP:  Clear to auscultation b/l, no wheezes, rales or rhonchi.  ABD: Normal bowel sounds; soft; non-distended; non-tender; no guarding/ rebound.  EXT: Normal ROM. No clubbing, cyanosis or edema. 2+ pulses to b/l ue/le.  NEURO: Alert, oriented, grossly unremarkable  PSYCH: Cooperative, mood and affect appropriate.

## 2022-11-20 NOTE — H&P ADULT - PROBLEM SELECTOR PLAN 3
F/s at home in 500s and 521 in ED, now 310 after 6 U regular insulin. Also had BHB 4.4, w urinary ketones and urinary glucose but no acidosis, tachypnea. BHB resolved after fluids and insulin. Polyuria and polydipsia. At home on 12U lantus at night and metformin ER 500mg qd. Last A1c 10.0 10/20/22.   - 2x fingerstick overnight w sliding scale   - no more fluids overnight due to faint crackles-->no respiratory distress or hypoxia  - c/w 12 U lantus at night  - will start lispro 2U premeal   - consistent carb diet Patient more fatigued w generalized weakness likely in setting of hyperglycemia and dehydration vs infexn (UTI), could be multifactorial. Also has hx of hypothyroidism. No recent falls/trauma. No other major metabolic abnormalities besides hyperglycemia. No drug use or withdrawal, no hypoxia, ekg changes to suggest acute vascular issue, no focal deficits to suggest CVA  - f/u TSH  - am cortisol  - treat hyperglycemia and infnxn (see plans for both)  - PT/OT

## 2022-11-20 NOTE — ED PROVIDER NOTE - NSICDXPASTMEDICALHX_GEN_ALL_CORE_FT
PAST MEDICAL HISTORY:  Abdominal wall hematoma     DM (diabetes mellitus)     HTN (hypertension)     Hypothyroidism     PAD (peripheral artery disease)     S/P angiogram of extremity

## 2022-11-20 NOTE — H&P ADULT - PROBLEM SELECTOR PLAN 1
Urinary symptoms w UA positive for pyuria, failed cipro as outpatient, possibly due to uncontrolled DM/hyperglycemia. Subjective L lumbar pain but no CVA tenderness on exam. No WBC, fever, or tachypnea. S/p CTX 1g in ED.  - c/w CTX 1g daily  - f/u urine and blood cx  - bladder scan to evaluate for urinary retention F/s at home in 500s and 521 in ED, now 310 after 6 U regular insulin. Also had BHB 4.4, w urinary ketones and urinary glucose but no acidosis, tachypnea. BHB resolved after fluids and insulin. Polyuria and polydipsia. At home on 12U lantus at night and metformin ER 500mg qd. Last A1c 10.0 10/20/22.   - 2x fingerstick overnight w sliding scale   - no more fluids overnight due to faint crackles-->no respiratory distress or hypoxia  - c/w 12 U lantus at night  - will start lispro 2U premeal   - consistent carb diet  - am cortisol  - f/ tsh  - endocrine in AM  - cassidy catheter, I/Os

## 2022-11-20 NOTE — H&P ADULT - HISTORY OF PRESENT ILLNESS
HPI: 86y/o F PMH PAD, DM, CAD w MARTINA to LAD Jan 2022, PAD, recent R flank hematoma after diagnostic RLE angiogram presents w urinary frequency of 1 week. She has been urinating more than usual for 7 days which is associated w cloudy urine, fatigue, generalized weakness, suprapubic pain, L flank pain, chills, reduced PO intake of solid foods, nausea, increased thirst and fluid intake. She has been weaker and more fatigued this week than usual. She usually walks on her own but now she requires assistance to walk due to generalized weakness.  Her fingersticks at home have been in 500s. Of note she had urinary retention on last admission and now w constipation of 1 week, last BM today which was soft. She does not feel the urge to have a BM. She went to her PCP on Monday and was prescribed ciprofloxacin which she picked up on Thursday and took for 3 days but is still having urinary symptoms. She denies lightheadedness, syncope, vomiting, fever, dysuria, hematuria, CP, SOB. ROS + for chronic intermittent numbness and tingling in feet. Also had 1 mechanical fall 3-5 months ago without head trauma or evaluation, and she also felt lightheaded around the same time and had a fall without head trauma or evaluation. No recent falls.     In the ED:  Initial vital signs: T: 98 F, HR: 106-->118-->99, BP: 165/83-->194/73-->166/74, R: 18-20, SpO2: 100% on RA  ED course:   Labs: significant for f/s 521-->310, Na 128-->132, K 5.4-->4.1, bicarb 23, BHB 4.4-->0, UA w many WBC, ketones >80, and glucose >1000,   Imaging:  CXR: globular heart, mediastinum appears wide but CXR unchanged since previous admission   EKG: NSR w KIMBERLY  Medications: losartan 50mg, diltiazem 120mg, toprol 25mg, 3L NS, 6U regular insulin   Consults: none

## 2022-11-20 NOTE — H&P ADULT - NSHPPHYSICALEXAM_GEN_ALL_CORE
T(C): 36.7 (11-20-22 @ 15:24), Max: 36.7 (11-20-22 @ 15:24)  HR: 96 (11-20-22 @ 21:42) (96 - 118)  BP: 166/74 (11-20-22 @ 21:42) (165/83 - 194/73)  RR: 17 (11-20-22 @ 21:42) (17 - 20)  SpO2: 97% (11-20-22 @ 21:42) (97% - 100%)    GEN - Elderly F in bed w head of bed raised, comfortable appearing  HEENT - MMM, PERRLA, extraocular musclse intact  RESP - faint basilar crackles, no increased work of breathing, not on O2  CARDIO - NS1S2, RRR. faint systolic murmur at base  ABD - hypoactive BS, soft w suprapubic tenderness, mild distension, no guarding or rigidity, no suprapubic tenderness, jsohi's sign negative  Ext - WWP, no JEO  Back - no CVA tenderness, no point tenderness over lumbar spine, no ecchymoses  Neuro - strength 5/5 in upper extremities, 4/5 in lower extremities, no focal weakness  Skin - clean, dry, intact. no rashes or lesions.    Psych- AAOx2 to person and place. normal affect, attentive, awake, alert

## 2022-11-21 DIAGNOSIS — R82.90 UNSPECIFIED ABNORMAL FINDINGS IN URINE: ICD-10-CM

## 2022-11-21 DIAGNOSIS — R53.1 WEAKNESS: ICD-10-CM

## 2022-11-21 DIAGNOSIS — Z87.898 PERSONAL HISTORY OF OTHER SPECIFIED CONDITIONS: ICD-10-CM

## 2022-11-21 LAB
A1C WITH ESTIMATED AVERAGE GLUCOSE RESULT: 11.6 % — HIGH (ref 4–5.6)
ANION GAP SERPL CALC-SCNC: 11 MMOL/L — SIGNIFICANT CHANGE UP (ref 5–17)
ANION GAP SERPL CALC-SCNC: 8 MMOL/L — SIGNIFICANT CHANGE UP (ref 5–17)
BUN SERPL-MCNC: 17 MG/DL — SIGNIFICANT CHANGE UP (ref 7–23)
BUN SERPL-MCNC: 19 MG/DL — SIGNIFICANT CHANGE UP (ref 7–23)
CALCIUM SERPL-MCNC: 8.4 MG/DL — SIGNIFICANT CHANGE UP (ref 8.4–10.5)
CALCIUM SERPL-MCNC: 8.5 MG/DL — SIGNIFICANT CHANGE UP (ref 8.4–10.5)
CHLORIDE SERPL-SCNC: 100 MMOL/L — SIGNIFICANT CHANGE UP (ref 96–108)
CHLORIDE SERPL-SCNC: 104 MMOL/L — SIGNIFICANT CHANGE UP (ref 96–108)
CO2 SERPL-SCNC: 22 MMOL/L — SIGNIFICANT CHANGE UP (ref 22–31)
CO2 SERPL-SCNC: 23 MMOL/L — SIGNIFICANT CHANGE UP (ref 22–31)
CREAT SERPL-MCNC: 0.54 MG/DL — SIGNIFICANT CHANGE UP (ref 0.5–1.3)
CREAT SERPL-MCNC: 0.6 MG/DL — SIGNIFICANT CHANGE UP (ref 0.5–1.3)
EGFR: 87 ML/MIN/1.73M2 — SIGNIFICANT CHANGE UP
EGFR: 89 ML/MIN/1.73M2 — SIGNIFICANT CHANGE UP
ESTIMATED AVERAGE GLUCOSE: 286 MG/DL — HIGH (ref 68–114)
GLUCOSE BLDC GLUCOMTR-MCNC: 109 MG/DL — HIGH (ref 70–99)
GLUCOSE BLDC GLUCOMTR-MCNC: 139 MG/DL — HIGH (ref 70–99)
GLUCOSE BLDC GLUCOMTR-MCNC: 177 MG/DL — HIGH (ref 70–99)
GLUCOSE BLDC GLUCOMTR-MCNC: 218 MG/DL — HIGH (ref 70–99)
GLUCOSE BLDC GLUCOMTR-MCNC: 79 MG/DL — SIGNIFICANT CHANGE UP (ref 70–99)
GLUCOSE SERPL-MCNC: 236 MG/DL — HIGH (ref 70–99)
GLUCOSE SERPL-MCNC: 97 MG/DL — SIGNIFICANT CHANGE UP (ref 70–99)
HCT VFR BLD CALC: 26.7 % — LOW (ref 34.5–45)
HGB BLD-MCNC: 9.2 G/DL — LOW (ref 11.5–15.5)
MAGNESIUM SERPL-MCNC: 1.6 MG/DL — SIGNIFICANT CHANGE UP (ref 1.6–2.6)
MAGNESIUM SERPL-MCNC: 2.6 MG/DL — SIGNIFICANT CHANGE UP (ref 1.6–2.6)
MCHC RBC-ENTMCNC: 31.1 PG — SIGNIFICANT CHANGE UP (ref 27–34)
MCHC RBC-ENTMCNC: 34.5 GM/DL — SIGNIFICANT CHANGE UP (ref 32–36)
MCV RBC AUTO: 90.2 FL — SIGNIFICANT CHANGE UP (ref 80–100)
NRBC # BLD: 0 /100 WBCS — SIGNIFICANT CHANGE UP (ref 0–0)
PHOSPHATE SERPL-MCNC: 1.7 MG/DL — LOW (ref 2.5–4.5)
PHOSPHATE SERPL-MCNC: 1.9 MG/DL — LOW (ref 2.5–4.5)
PLATELET # BLD AUTO: 396 K/UL — SIGNIFICANT CHANGE UP (ref 150–400)
POTASSIUM SERPL-MCNC: 3.6 MMOL/L — SIGNIFICANT CHANGE UP (ref 3.5–5.3)
POTASSIUM SERPL-MCNC: 4 MMOL/L — SIGNIFICANT CHANGE UP (ref 3.5–5.3)
POTASSIUM SERPL-SCNC: 3.6 MMOL/L — SIGNIFICANT CHANGE UP (ref 3.5–5.3)
POTASSIUM SERPL-SCNC: 4 MMOL/L — SIGNIFICANT CHANGE UP (ref 3.5–5.3)
RBC # BLD: 2.96 M/UL — LOW (ref 3.8–5.2)
RBC # FLD: 13.2 % — SIGNIFICANT CHANGE UP (ref 10.3–14.5)
SODIUM SERPL-SCNC: 133 MMOL/L — LOW (ref 135–145)
SODIUM SERPL-SCNC: 135 MMOL/L — SIGNIFICANT CHANGE UP (ref 135–145)
T4 AB SER-ACNC: 6.96 UG/DL — SIGNIFICANT CHANGE UP (ref 4.5–11.7)
WBC # BLD: 12.63 K/UL — HIGH (ref 3.8–10.5)
WBC # FLD AUTO: 12.63 K/UL — HIGH (ref 3.8–10.5)

## 2022-11-21 PROCEDURE — 99222 1ST HOSP IP/OBS MODERATE 55: CPT | Mod: GC

## 2022-11-21 PROCEDURE — 99232 SBSQ HOSP IP/OBS MODERATE 35: CPT

## 2022-11-21 RX ORDER — MAGNESIUM SULFATE 500 MG/ML
2 VIAL (ML) INJECTION ONCE
Refills: 0 | Status: COMPLETED | OUTPATIENT
Start: 2022-11-21 | End: 2022-11-21

## 2022-11-21 RX ORDER — CILOSTAZOL 100 MG/1
50 TABLET ORAL EVERY 12 HOURS
Refills: 0 | Status: DISCONTINUED | OUTPATIENT
Start: 2022-11-21 | End: 2022-11-23

## 2022-11-21 RX ORDER — CEFTRIAXONE 500 MG/1
1000 INJECTION, POWDER, FOR SOLUTION INTRAMUSCULAR; INTRAVENOUS EVERY 24 HOURS
Refills: 0 | Status: COMPLETED | OUTPATIENT
Start: 2022-11-21 | End: 2022-11-22

## 2022-11-21 RX ORDER — ATORVASTATIN CALCIUM 80 MG/1
40 TABLET, FILM COATED ORAL AT BEDTIME
Refills: 0 | Status: DISCONTINUED | OUTPATIENT
Start: 2022-11-21 | End: 2022-11-23

## 2022-11-21 RX ORDER — INSULIN GLARGINE 100 [IU]/ML
6 INJECTION, SOLUTION SUBCUTANEOUS AT BEDTIME
Refills: 0 | Status: DISCONTINUED | OUTPATIENT
Start: 2022-11-21 | End: 2022-11-23

## 2022-11-21 RX ORDER — ACETAMINOPHEN 500 MG
575 TABLET ORAL ONCE
Refills: 0 | Status: COMPLETED | OUTPATIENT
Start: 2022-11-21 | End: 2022-11-21

## 2022-11-21 RX ORDER — INSULIN LISPRO 100/ML
4 VIAL (ML) SUBCUTANEOUS ONCE
Refills: 0 | Status: COMPLETED | OUTPATIENT
Start: 2022-11-21 | End: 2022-11-21

## 2022-11-21 RX ORDER — NITROGLYCERIN 6.5 MG
1 CAPSULE, EXTENDED RELEASE ORAL DAILY
Refills: 0 | Status: DISCONTINUED | OUTPATIENT
Start: 2022-11-21 | End: 2022-11-23

## 2022-11-21 RX ORDER — DILTIAZEM HCL 120 MG
120 CAPSULE, EXT RELEASE 24 HR ORAL EVERY 24 HOURS
Refills: 0 | Status: DISCONTINUED | OUTPATIENT
Start: 2022-11-21 | End: 2022-11-23

## 2022-11-21 RX ORDER — POTASSIUM CHLORIDE 20 MEQ
40 PACKET (EA) ORAL ONCE
Refills: 0 | Status: COMPLETED | OUTPATIENT
Start: 2022-11-21 | End: 2022-11-21

## 2022-11-21 RX ORDER — METOPROLOL TARTRATE 50 MG
25 TABLET ORAL EVERY 24 HOURS
Refills: 0 | Status: DISCONTINUED | OUTPATIENT
Start: 2022-11-21 | End: 2022-11-23

## 2022-11-21 RX ORDER — RANOLAZINE 500 MG/1
500 TABLET, FILM COATED, EXTENDED RELEASE ORAL EVERY 12 HOURS
Refills: 0 | Status: DISCONTINUED | OUTPATIENT
Start: 2022-11-21 | End: 2022-11-23

## 2022-11-21 RX ORDER — ASPIRIN/CALCIUM CARB/MAGNESIUM 324 MG
81 TABLET ORAL EVERY 24 HOURS
Refills: 0 | Status: DISCONTINUED | OUTPATIENT
Start: 2022-11-21 | End: 2022-11-23

## 2022-11-21 RX ORDER — LOSARTAN POTASSIUM 100 MG/1
50 TABLET, FILM COATED ORAL EVERY 24 HOURS
Refills: 0 | Status: DISCONTINUED | OUTPATIENT
Start: 2022-11-21 | End: 2022-11-23

## 2022-11-21 RX ORDER — CLOPIDOGREL BISULFATE 75 MG/1
75 TABLET, FILM COATED ORAL EVERY 24 HOURS
Refills: 0 | Status: DISCONTINUED | OUTPATIENT
Start: 2022-11-21 | End: 2022-11-23

## 2022-11-21 RX ORDER — LEVOTHYROXINE SODIUM 125 MCG
75 TABLET ORAL DAILY
Refills: 0 | Status: DISCONTINUED | OUTPATIENT
Start: 2022-11-21 | End: 2022-11-23

## 2022-11-21 RX ORDER — SODIUM,POTASSIUM PHOSPHATES 278-250MG
1 POWDER IN PACKET (EA) ORAL ONCE
Refills: 0 | Status: COMPLETED | OUTPATIENT
Start: 2022-11-21 | End: 2022-11-21

## 2022-11-21 RX ORDER — ENOXAPARIN SODIUM 100 MG/ML
30 INJECTION SUBCUTANEOUS EVERY 24 HOURS
Refills: 0 | Status: DISCONTINUED | OUTPATIENT
Start: 2022-11-21 | End: 2022-11-23

## 2022-11-21 RX ADMIN — INSULIN GLARGINE 6 UNIT(S): 100 INJECTION, SOLUTION SUBCUTANEOUS at 22:40

## 2022-11-21 RX ADMIN — Medication 1 TABLET(S): at 14:02

## 2022-11-21 RX ADMIN — CEFTRIAXONE 100 MILLIGRAM(S): 500 INJECTION, POWDER, FOR SOLUTION INTRAMUSCULAR; INTRAVENOUS at 19:00

## 2022-11-21 RX ADMIN — INSULIN GLARGINE 12 UNIT(S): 100 INJECTION, SOLUTION SUBCUTANEOUS at 00:50

## 2022-11-21 RX ADMIN — Medication 575 MILLIGRAM(S): at 07:04

## 2022-11-21 RX ADMIN — Medication 4 UNIT(S): at 00:50

## 2022-11-21 RX ADMIN — RANOLAZINE 500 MILLIGRAM(S): 500 TABLET, FILM COATED, EXTENDED RELEASE ORAL at 11:05

## 2022-11-21 RX ADMIN — LOSARTAN POTASSIUM 50 MILLIGRAM(S): 100 TABLET, FILM COATED ORAL at 14:02

## 2022-11-21 RX ADMIN — CLOPIDOGREL BISULFATE 75 MILLIGRAM(S): 75 TABLET, FILM COATED ORAL at 19:00

## 2022-11-21 RX ADMIN — Medication 1 PACKET(S): at 04:40

## 2022-11-21 RX ADMIN — Medication 40 MILLIEQUIVALENT(S): at 04:40

## 2022-11-21 RX ADMIN — Medication 120 MILLIGRAM(S): at 19:18

## 2022-11-21 RX ADMIN — Medication 62.5 MILLIMOLE(S): at 14:01

## 2022-11-21 RX ADMIN — Medication 25 MILLIGRAM(S): at 19:00

## 2022-11-21 RX ADMIN — ENOXAPARIN SODIUM 30 MILLIGRAM(S): 100 INJECTION SUBCUTANEOUS at 06:39

## 2022-11-21 RX ADMIN — Medication 2 UNIT(S): at 18:17

## 2022-11-21 RX ADMIN — Medication 81 MILLIGRAM(S): at 11:05

## 2022-11-21 RX ADMIN — CILOSTAZOL 50 MILLIGRAM(S): 100 TABLET ORAL at 11:55

## 2022-11-21 RX ADMIN — Medication 75 MICROGRAM(S): at 06:39

## 2022-11-21 RX ADMIN — ATORVASTATIN CALCIUM 40 MILLIGRAM(S): 80 TABLET, FILM COATED ORAL at 22:40

## 2022-11-21 RX ADMIN — Medication 25 GRAM(S): at 04:40

## 2022-11-21 RX ADMIN — Medication 2: at 18:18

## 2022-11-21 RX ADMIN — Medication 1: at 22:40

## 2022-11-21 RX ADMIN — Medication 1 PATCH: at 11:05

## 2022-11-21 RX ADMIN — Medication 1 PATCH: at 19:11

## 2022-11-21 RX ADMIN — Medication 230 MILLIGRAM(S): at 06:34

## 2022-11-21 NOTE — DIETITIAN INITIAL EVALUATION ADULT - PERTINENT MEDS FT
MEDICATIONS  (STANDING):  aspirin enteric coated 81 milliGRAM(s) Oral every 24 hours  atorvastatin 40 milliGRAM(s) Oral at bedtime  cefTRIAXone   IVPB 1000 milliGRAM(s) IV Intermittent every 24 hours  cilostazol 50 milliGRAM(s) Oral every 12 hours  clopidogrel Tablet 75 milliGRAM(s) Oral every 24 hours  dextrose 5%. 1000 milliLiter(s) (50 mL/Hr) IV Continuous <Continuous>  dextrose 50% Injectable 25 Gram(s) IV Push once  diltiazem    milliGRAM(s) Oral every 24 hours  enoxaparin Injectable 30 milliGRAM(s) SubCutaneous every 24 hours  glucagon  Injectable 1 milliGRAM(s) IntraMuscular once  insulin glargine Injectable (LANTUS) 12 Unit(s) SubCutaneous at bedtime  insulin lispro (ADMELOG) corrective regimen sliding scale   SubCutaneous Before meals and at bedtime  insulin lispro Injectable (ADMELOG) 2 Unit(s) SubCutaneous three times a day before meals  levothyroxine 75 MICROGram(s) Oral daily  losartan 50 milliGRAM(s) Oral every 24 hours  metoprolol succinate ER 25 milliGRAM(s) Oral every 24 hours  nitroglycerin    Patch 0.2 mG/Hr(s) 1 patch Transdermal daily  ranolazine 500 milliGRAM(s) Oral every 12 hours  sodium phosphate 15 milliMole(s)/250 mL IVPB 15 milliMole(s) IV Intermittent once    MEDICATIONS  (PRN):  dextrose Oral Gel 15 Gram(s) Oral once PRN Blood Glucose LESS THAN 70 milliGRAM(s)/deciliter

## 2022-11-21 NOTE — DIETITIAN INITIAL EVALUATION ADULT - ADD RECOMMEND
1. Continue with current diet order    >>Ensure Max Protein ONS TID (150kcal, 30gPRO per serving), this has 1g of sugar which will help promote increased PO intakes, healthy weight gain, and euglycemia  2. Encourage pt to meet nutritional needs as able   3. Monitor PO intakes, trend weights (weekly), monitor skin integrity, monitor labs (electrolytes, CMP), monitor GI fxn   4. Encourage adherence to diet education (reinforce as able)   5. Recommend MVI supplementation; continue insulin regimen to promote euglycemia  6. Pain and bowel regimen per team   7. Will continue to assess/honor preferences as able   8. Align nutrition interventions with GOC at all times

## 2022-11-21 NOTE — PROGRESS NOTE ADULT - PROBLEM SELECTOR PLAN 8
Takes cilosatazol 50mg BID. No leg pain currently. Lower extremities wwp.   - c/w home med. Within functional limits Delayed pharyngeal swallow/Decreased laryngeal elevation

## 2022-11-21 NOTE — DIETITIAN INITIAL EVALUATION ADULT - PROBLEM SELECTOR PROBLEM 2
Patient contacted regarding COVID-19 risk. Discussed COVID-19 related testing which was available at this time. Test results were negative. Patient informed of results, if available? Yes. Ambulatory Care Manager contacted the patient by telephone to perform post discharge assessment. Call within 2 business days of discharge: Yes. Verified name and  with patient as identifiers. Provided introduction to self, and explanation of the CTN/ACM role, and reason for call due to risk factors for infection and/or exposure to COVID-19. Symptoms reviewed with patient who verbalized the following symptoms: fatigue. Reports that she is feeling much better. Encouraged rest/ hydration. Due to no new or worsening symptoms encounter was not routed to provider for escalation. Discussed follow-up appointments. If no appointment was previously scheduled, appointment scheduling offered: Yes. Patient declined support; agreeable to take contact information for Provider referral line. Encouraged patient follow through. [facility] follow up appointment(s): No future appointments. Non-[facility] follow up appointment(s):     Non-face-to-face services provided:  Education of patient/family/caregiver/guardian to support self-management-1     Advance Care Planning:   Does patient have an Advance Directive:  not on file. Educated patient about risk for severe COVID-19 due to risk factors according to CDC guidelines. ACM reviewed discharge instructions, medical action plan and red flag symptoms with the patient who verbalized understanding. Discussed COVID vaccination status: Yes. Education provided on COVID-19 vaccination as appropriate. Discussed exposure protocols and quarantine with CDC Guidelines. Patient was given an opportunity to verbalize any questions and concerns and agrees to contact ACM or health care provider for questions related to their healthcare.     Reviewed and educated patient on any new and changed medications
Abnormal urinalysis

## 2022-11-21 NOTE — PHYSICAL THERAPY INITIAL EVALUATION ADULT - ADDITIONAL COMMENTS
Pt has had ~3-4 falls in past year (all mechanical falls). Pt lives in apt building with no steps to negotiate (+elevator access). Lives with daughter. Son also supports as needed.

## 2022-11-21 NOTE — PROGRESS NOTE ADULT - PROBLEM SELECTOR PLAN 6
Urinary retention on last admission for which she followed up with urology outpatient and they suspected it is due to her DM. She has increased urgency. Currently has cassidy catheter.  - c/w cassidy catheter for accurate I/Os and prevention of retention.

## 2022-11-21 NOTE — CONSULT NOTE ADULT - ATTENDING COMMENTS
Pt seen on rounds this afternoon.  Daughter was at the bedside and provided much of the history.    87-yo woman with type 2 DM, CAD and PAD, hypothyroidism--is on basal insulin plus metformin at home but with A1c level 11.6%, now admitted for uncontrolled hyperglycemia and UTI.  Had recent angio for her PAD--unclear whether stent place.  Has not palpable pulses in the feet, but no ischemic signs.  Daughter reports that although the pt's prescribed Lantus regimen is 20 units, the daughter would never give her this much because the pt would become hypoglycemic in the AM,.  In fact the daughter reports hypoglycemia with Lantus doses as low as 10 units.  Fingersticks at home tend to be in the 200 range in the AM, > 300 in the PM  Glucose on presentation was 514 mg%, decrease to 304 after 6 units reg IV.  She then fell to 79 this morning after 12 Lantus plus 4 units correction for the 304 mg% at 10 PM  This pt is clearly quite insulin sensitive, and also almost certainly quite insulinopenic.  These pts can be very difficult to treat.  She almost certainly needs pre-meal lispro  Based on the daughter's report plus the few insulin doses she was given so far:  --She needs less than 10 units of Lantus--will decrease to 6 units for tonight  --Will start 2 units of lispro pre-meal.  TSH level normal (3.2 uU/ml) on her current levothyroxine

## 2022-11-21 NOTE — PROGRESS NOTE ADULT - PROBLEM SELECTOR PLAN 2
Urinary symptoms w UA positive for pyuria, negative nitrites. Reasonable to treat for infection though symptoms likely caused by hyperglycemia. Suprapubic tenderness and subjective L lumbar pain on exam, but no CVA tenderness. No WBC, fever, or tachypnea. S/p CTX 1g in ED.   - c/w CTX 1g daily, in NS  - f/u urine and blood cx. Urinary symptoms w UA positive for pyuria, negative nitrites. Reasonable to treat for infection though symptoms likely caused by hyperglycemia. Suprapubic tenderness and subjective L lumbar pain on exam, but no CVA tenderness. No WBC, fever, or tachypnea. S/p CTX 1g in ED.   - c/w CTX 1g daily, in NS  - f/u urine cx. Urinary symptoms w UA positive for pyuria, negative nitrites. Suprapubic tenderness, no CVA tenderness on exam. WBC 10.20 -> 12.63 11/21 AM. No fever, tachypnea. S/p CTX 1g in ED.   - c/w CTX 1g daily, in NS  - f/u urine cx.

## 2022-11-21 NOTE — PROGRESS NOTE ADULT - PROBLEM SELECTOR PLAN 4
HTN in ED to 194/73 (did not take home meds of telmisartan 40mg daily, toprol 25mg daily and diltiazem 120mg daily) -> 160s/70 after losartan and toprol -> 132/81 11/21 AM.  - c/w home medications.

## 2022-11-21 NOTE — DIETITIAN INITIAL EVALUATION ADULT - COLLABORATION WITH OTHER PROVIDERS
RD has collaborated with team in regards to diet recs, ONS/nourishment recs, pt's overall nutritional status.

## 2022-11-21 NOTE — PATIENT PROFILE ADULT - FALL HARM RISK - HARM RISK INTERVENTIONS

## 2022-11-21 NOTE — CONSULT NOTE ADULT - ASSESSMENT
per Internal Medicine    87 y o F PMH PAD, DM, CAD w MARTINA to LAD Jan 2022, PAD, recent R flank hematoma after diagnostic RLE angiogram presents w urinary frequency of 1 week and admitted for hyperglycemia.      Problem/Plan - 1:  ·  Problem: Hyperglycemia.   ·  Plan: F/s at home in 500s, 521 in ED -> 310 after 6 U regular insulin -> 139 11/21 AM. U/A showed urinary ketones and urinary glucose, but currently has no acidosis or tachypnea. At home on 12U lantus at night and metformin ER 500mg qd. Last A1c 10.0 10/20/22.  - 2x fingerstick overnight w sliding scale   - c/w 12 U lantus at night  - will start lispro 2U premeal   - consistent carb diet  - am cortisol  - f/ tsh  - endocrine in AM  - cassidy catheter, I/Os.    Problem/Plan - 2:  ·  Problem: Abnormal urinalysis.   ·  Plan: Urinary symptoms w UA positive for pyuria, negative nitrites. Reasonable to treat for infection though symptoms likely caused by hyperglycemia. Suprapubic tenderness and subjective L lumbar pain on exam, but no CVA tenderness. No WBC, fever, or tachypnea. S/p CTX 1g in ED.   - c/w CTX 1g daily, in NS  - f/u urine and blood cx.    Problem/Plan - 3:  ·  Problem: Weakness.   ·  Plan: Patient more fatigued w generalized weakness likely in setting of hyperglycemia and dehydration vs infexn (UTI), could be multifactorial. Also has hx of hypothyroidism. No recent falls/trauma. No other major metabolic abnormalities besides hyperglycemia. No drug use or withdrawal, no hypoxia, EKG changes to suggest acute vascular issue, no focal deficits to suggest CVA.  - f/u TSH  - am cortisol  - treat hyperglycemia and infnxn (see plans for both)  - PT/OT.    Problem/Plan - 4:  ·  Problem: HTN (hypertension).   ·  Plan: HTN in ED to 194/73 (did not take home meds of telmisartan 40mg daily, toprol 25mg daily and diltiazem 120mg daily) -> 160s/70 after losartan and toprol -> 132/81 11/21 AM.  - c/w home medications.    Problem/Plan - 5:  ·  Problem: CAD (coronary artery disease).   ·  Plan: s/p stent in LAD at [facility] in January 2022. Takes daily nitroglycerin patch which she wears during the day. Also takes ranolazine ER 500mg BID, ASA 81mg daily, plavix 75mg daily, lipitor 40mg daily. Over 6 months since stent but may have other reasons for DAPT in setting of PAD.   - will c/w above medications.    Problem/Plan - 6:  ·  Problem: History of urinary retention.   ·  Plan: Urinary retention on last admission for which she followed up with urology outpatient and they suspected it is due to her DM. She has increased urgency. Currently has cassidy catheter.  - c/w cassidy catheter for accurate I/Os and prevention of retention.    Problem/Plan - 7:  ·  Problem: Hypothyroidism.   ·  Plan: Takes synthroid 75mcg daily.   - c/w synthroid 75 mcg daily  - f/u tsh.    Problem/Plan - 8:  ·  Problem: PAD (peripheral artery disease).   ·  Plan: Takes cilosatazol 50mg BID. No leg pain currently. Lower extremities wwp.   - c/w home med.    Problem/Plan - 9:  ·  Problem: Prophylactic measure.   ·  Plan: Mg>2, K>4  Prophylaxis: lovenox 30mg daily  Activity: Ambulate w assistance  GI: none  C: FC  Dispo: Admit to [facility].

## 2022-11-21 NOTE — PATIENT PROFILE ADULT - NSPRESCRALCFREQ_GEN_A_NUR
Deterioration of Condition En Route/Death or Disability/Transportation Risk (There is always a risk of traffic delays resulting in deterioration of condition.)
Monthly or less

## 2022-11-21 NOTE — CONSULT NOTE ADULT - SUBJECTIVE AND OBJECTIVE BOX
Patient is a 87y old  Female who presents with a chief complaint of UTI (2022 07:09)        HPI:  HPI: 86y/o F PMH PAD, DM, CAD w MARTINA to LAD 2022, PAD, recent R flank hematoma after diagnostic RLE angiogram presents w urinary frequency of 1 week. She has been urinating more than usual for 7 days which is associated w cloudy urine, fatigue, generalized weakness, suprapubic pain, L flank pain, chills, reduced PO intake of solid foods, nausea, increased thirst and fluid intake. She has been weaker and more fatigued this week than usual. She usually walks on her own but now she requires assistance to walk due to generalized weakness.  Her fingersticks at home have been in 500s. Of note she had urinary retention on last admission and now w constipation of 1 week, last BM today which was soft. She does not feel the urge to have a BM. She went to her PCP on Monday and was prescribed ciprofloxacin which she picked up on Thursday and took for 3 days but is still having urinary symptoms. She denies lightheadedness, syncope, vomiting, fever, dysuria, hematuria, CP, SOB. ROS + for chronic intermittent numbness and tingling in feet. Also had 1 mechanical fall 3-5 months ago without head trauma or evaluation, and she also felt lightheaded around the same time and had a fall without head trauma or evaluation. No recent falls.     In the ED:  Initial vital signs: T: 98 F, HR: 106-->118-->99, BP: 165/83-->194/73-->166/74, R: 18-20, SpO2: 100% on RA  ED course:   Labs: significant for f/s 521-->310, Na 128-->132, K 5.4-->4.1, bicarb 23, BHB 4.4-->0, UA w many WBC, ketones >80, and glucose >1000,   Imaging:  CXR: globular heart, mediastinum appears wide but CXR unchanged since previous admission   EKG: NSR w KIMBERLY  Medications: losartan 50mg, diltiazem 120mg, toprol 25mg, 3L NS, 6U regular insulin   Consults: none      (2022 22:02)      PAST MEDICAL & SURGICAL HISTORY:  CAD (coronary artery disease)  s/p stent to LAD 2022          MEDICATIONS  (STANDING):  aspirin enteric coated 81 milliGRAM(s) Oral every 24 hours  atorvastatin 40 milliGRAM(s) Oral at bedtime  cefTRIAXone   IVPB 1000 milliGRAM(s) IV Intermittent every 24 hours  cilostazol 50 milliGRAM(s) Oral every 12 hours  clopidogrel Tablet 75 milliGRAM(s) Oral every 24 hours  dextrose 5%. 1000 milliLiter(s) (50 mL/Hr) IV Continuous <Continuous>  dextrose 50% Injectable 25 Gram(s) IV Push once  diltiazem    milliGRAM(s) Oral every 24 hours  enoxaparin Injectable 30 milliGRAM(s) SubCutaneous every 24 hours  glucagon  Injectable 1 milliGRAM(s) IntraMuscular once  insulin glargine Injectable (LANTUS) 12 Unit(s) SubCutaneous at bedtime  insulin lispro (ADMELOG) corrective regimen sliding scale   SubCutaneous Before meals and at bedtime  insulin lispro Injectable (ADMELOG) 2 Unit(s) SubCutaneous three times a day before meals  levothyroxine 75 MICROGram(s) Oral daily  losartan 50 milliGRAM(s) Oral every 24 hours  metoprolol succinate ER 25 milliGRAM(s) Oral every 24 hours  nitroglycerin    Patch 0.2 mG/Hr(s) 1 patch Transdermal daily  ranolazine 500 milliGRAM(s) Oral every 12 hours    MEDICATIONS  (PRN):  dextrose Oral Gel 15 Gram(s) Oral once PRN Blood Glucose LESS THAN 70 milliGRAM(s)/deciliter         FAMILY HISTORY:  No pertinent family history in first degree relatives      CBC Full  -  ( 2022 15:44 )  WBC Count : 10.20 K/uL  RBC Count : 3.66 M/uL  Hemoglobin : 11.4 g/dL  Hematocrit : 33.7 %  Platelet Count - Automated : 476 K/uL  Mean Cell Volume : 92.1 fl  Mean Cell Hemoglobin : 31.1 pg  Mean Cell Hemoglobin Concentration : 33.8 gm/dL  Auto Neutrophil # : 7.49 K/uL  Auto Lymphocyte # : 1.68 K/uL  Auto Monocyte # : 0.78 K/uL  Auto Eosinophil # : 0.07 K/uL  Auto Basophil # : 0.07 K/uL  Auto Neutrophil % : 73.4 %  Auto Lymphocyte % : 16.5 %  Auto Monocyte % : 7.6 %  Auto Eosinophil % : 0.7 %  Auto Basophil % : 0.7 %          133<L>  |  100  |  19  ----------------------------<  236<H>  3.6   |  22  |  0.60    Ca    8.5      2022 03:17  Phos  1.7       Mg     1.6         TPro  8.2  /  Alb  3.2<L>  /  TBili  0.2  /  DBili  x   /  AST  24  /  ALT  15  /  AlkPhos  144<H>  -20      Urinalysis Basic - ( 2022 18:32 )    Color: Yellow / Appearance: Clear / S.020 / pH: x  Gluc: x / Ketone: >=80 mg/dL  / Bili: Negative / Urobili: 0.2 E.U./dL   Blood: x / Protein: 100 mg/dL / Nitrite: NEGATIVE   Leuk Esterase: Small / RBC: < 5 /HPF / WBC Many /HPF   Sq Epi: x / Non Sq Epi: 0-5 /HPF / Bacteria: Present /HPF          Radiology :                   Vital Signs Last 24 Hrs  T(C): 37 (2022 05:00), Max: 37.1 (2022 01:55)  T(F): 98.6 (2022 05:00), Max: 98.7 (2022 01:55)  HR: 88 (2022 05:00) (88 - 118)  BP: 132/81 (2022 05:00) (132/81 - 194/73)  BP(mean): --  RR: 18 (2022 05:00) (17 - 20)  SpO2: 96% (2022 05:00) (96% - 100%)    Parameters below as of 2022 05:00  Patient On (Oxygen Delivery Method): room air            REVIEW OF SYSTEMS:  per hpi          Physical Exam:  frail 87 y o woman lying comfortably in semi Mcknight's position , awake , alert , no current complaints     Head : normocephalic , atraumatic    Eyes : PERRLA , EOMI , no nystagmus , sclera anicteric    ENT : nasal discharge , uvula midline , no oropharyngeal erythema / exudate    Neck : supple , negative JVD , negative carotid bruits , no thyromegaly    Chest : dec bs     Cardiovascular: regular rate and rhythm , neg murmurs / rubs / gallops    Abdomen : soft , non distended , SPT     Extremities : WWP , neg cyanosis /clubbing / edema      Neurologic Exam:    Alert and oriented to person , place , speech fluent w/o dysarthria , follows commands     Cranial Nerves:     II :                         pupils equal , round and reactive to light , visual fields intact   III/ IV/VI :              extraocular movements intact , neg nystagmus , neg ptosis  V :                        facial sensation intact , V1-3 normal  VII :                      face symmetric , no droop , normal eye closure and smile  VIII :                     hearing intact to finger rub bilaterally  IX and X :             no hoarseness , gag intact , palate/ uvula rise symmetrically  XI :                       SCM / trapezius strength intact bilateral  XII :                      no tongue deviation    Motor Exam:          Right UE:               no focal weakness ,  > 3+/5 throughout  , no drift                                 Left UE:                 no focal weakness,  > 3+/5 throughout  , no drift          Right LE:                no focal weakness,  > 3+/5 throughout       Left LE:                  no focal weakness,  > 3+/5 throughout      5/5 : hip flexors        Sensation:         intact to light touch x 4 extremities                         DTR :                     biceps/brachioradialis : equal                                              patella/ankle : equal       Gait :  not tested        PM&R Impression :     1) deconditioned    2) no focal weakness      Recommendations / Plan :     1) Physical / Occupational therapy focusing on therapeutic exercises , equipment evaluation , bed mobility/transfer out of bed evaluation , progressive ambulation with assistive devices prn .    2) Disposition Plan / Recs  :  pending functional progress

## 2022-11-21 NOTE — DIETITIAN INITIAL EVALUATION ADULT - OBTAIN WEEKLY WEIGHT
Department of Anesthesiology  Preprocedure Note       Name:  Lloyd Elliott   Age:  80 y.o.  :  3/30/1932                                          MRN:  [id number]         Date:  2021      Surgeon: Allison Cain):  Cara Nuñez MD    Procedure: Procedure(s):  EGD ESOPHAGOGASTRODUODENOSCOPY PEG TUBE INSERTION    Medications prior to admission:   Prior to Admission medications    Medication Sig Start Date End Date Taking? Authorizing Provider   hydrALAZINE (APRESOLINE) 25 MG tablet Take 1 tablet by mouth 3 times daily 20  Yes Ifrah Russell PA-C   tamsulosin [facility]) 0.4 MG capsule Take 1 capsule by mouth daily 20  Yes Kwaku Avery MD   warfarin (COUMADIN) 3 MG tablet Take 1 tablet by mouth See Admin Instructions Indications: Anticoagulant Therapy, Atrial Fibrillation [facility] coumadin clinic to dose, #45=30 days supply 17  Yes Estrella Acosta MD   furosemide (LASIX) 20 MG tablet Take 1 tablet by mouth daily 17  Yes Aviva Kline MD   pantoprazole (PROTONIX) 40 MG tablet Take 1 tablet by mouth 2 times daily (before meals). 3/1/15  Yes ROBBIE Tom - CNP   pravastatin (PRAVACHOL) 40 MG tablet Take 40 mg by mouth every evening. Indications: Blood Cholesterol Abnormal   Yes Historical Provider, MD   aspirin 81 MG EC tablet Take 81 mg by mouth daily Indications: Anticoagulant Therapy Take with food.    Yes Historical Provider, MD   cyclobenzaprine (FLEXERIL) 5 MG tablet Take 1 tablet by mouth 3 times daily as needed for Muscle spasms  Patient not taking: Reported on 2021   Alyssa Amaya PA-C       Current medications:    Current Facility-Administered Medications   Medication Dose Route Frequency Provider Last Rate Last Admin    0.9 % sodium chloride infusion   Intravenous PRN Cara Nuñez MD        [Held by provider] enoxaparin (LOVENOX) injection 80 mg  1 mg/kg Subcutaneous Q12H Jak Aldrich MD   Stopped at 21 0742    dextrose 5 % and 0.45 % sodium chloride infusion   Intravenous Continuous Olga Lidia Mari DO 30 mL/hr at 01/26/21 0513 New Bag at 01/26/21 0513    melatonin tablet 3 mg  3 mg Oral Nightly Josh Romo MD   3 mg at 01/21/21 2017    calcium replacement protocol   Other RX Placeholder Sidonie Castles, DO        phosphorus replacement protocol   Other RX Placeholder Sidonie Castles, DO        potassium chloride (KLOR-CON M) extended release tablet 40 mEq  40 mEq Oral PRN Sidonie Castles, DO        Or    potassium bicarb-citric acid (EFFER-K) effervescent tablet 40 mEq  40 mEq Oral PRN Sidonie Castles, DO        Or    potassium chloride 10 mEq/100 mL IVPB (Peripheral Line)  10 mEq Intravenous PRN Sidonie Castles, DO        potassium chloride 20 mEq/50 mL IVPB (Central Line)  20 mEq Intravenous PRN Sidonie Castles, DO        potassium chloride (KLOR-CON M) extended release tablet 20 mEq  20 mEq Oral PRN Sidonie Castles, DO        acetaminophen (TYLENOL) tablet 650 mg  650 mg Oral Q6H PRN Petrona Cuadra APRN - CNP   650 mg at 01/20/21 0100    Or    acetaminophen (TYLENOL) suppository 650 mg  650 mg Rectal Q6H PRN Petrona Cuadra APRN - CNP        lactobacillus (CULTURELLE) capsule 1 capsule  1 capsule Oral Daily with breakfast Anthony Richardson, DO   1 capsule at 01/22/21 0859    aspirin EC tablet 81 mg  81 mg Oral Daily Bassam Claros MD   81 mg at 01/22/21 0858    cyclobenzaprine (FLEXERIL) tablet 5 mg  5 mg Oral TID PRN Bassam Claros MD   5 mg at 01/20/21 0243    hydrALAZINE (APRESOLINE) tablet 25 mg  25 mg Oral TID Petrona Cuadra APRN - CNP   25 mg at 01/22/21 0858    pantoprazole (PROTONIX) tablet 40 mg  40 mg Oral BID AC Bassam Claros MD   40 mg at 01/22/21 0902    [Held by provider] pravastatin (PRAVACHOL) tablet 40 mg  40 mg Oral QPM Bassam Claros MD   40 mg at 01/17/21 2157    tamsulosin (FLOMAX) capsule 0.4 mg 0.4 mg Oral Daily Baljinder Mckeon MD   0.4 mg at 01/22/21 0859    sodium chloride flush 0.9 % injection 10 mL  10 mL Intravenous 2 times per day Baljinder Mckeon MD   10 mL at 01/26/21 1148    sodium chloride flush 0.9 % injection 10 mL  10 mL Intravenous PRN Baljinder Mckeon MD   10 mL at 01/26/21 1108    promethazine (PHENERGAN) tablet 12.5 mg  12.5 mg Oral Q6H PRN Baljinder Mckeon MD        Or    ondansetron (ZOFRAN) injection 4 mg  4 mg Intravenous Q6H PRN Baljinder Mckeon MD        polyethylene glycol (GLYCOLAX) packet 17 g  17 g Oral Daily PRN Baljinder Mckeon MD        magnesium sulfate 2 g in 50 mL IVPB premix  2 g Intravenous PRN ROBBIE Lawson - CNP   Stopped at 01/18/21 0013    mupirocin (BACTROBAN) 2 % ointment   Topical PRN Petrona Fontenot APRN - CNP           Allergies:  No Known Allergies    Problem List:    Patient Active Problem List   Diagnosis Code    Hyperlipidemia E78.5    Essential hypertension I10    Carotid artery disease ([facility]) I77.9    S/P CABG (coronary artery bypass graft) Z95.1    Inguinal hernia, left K40.90    Generalized abdominal pain R10.84    Hyponatremia E87.1    Leukocytosis D72.829    Enteritis K52.9    SIRS (systemic inflammatory response syndrome) ([facility]) R65.10    Acidosis E87.2    Small bowel obstruction ([facility] [address] K56.609    Small bowel ischemia ([facility] [address] K55.9    Anticoagulated on Coumadin Z79.01    History of cardioversion Z98.890    Hiatal hernia K44.9    Coronary artery disease involving native coronary artery of native heart without angina pectoris I25.10    History of stroke Z86.73    History of ischemic bowel disease Z87.19    Bradycardia R00.1    Enteritis K52.9    Atrial fibrillation ([facility]) I48.91    Symptomatic bradycardia R00.1    Traumatic rhabdomyolysis ([address] T79. 6XXA    Fall W19. XXXA    Supratherapeutic INR R79.1    Coagulopathy ([address] D68.9    COVID-19 U07.1    Elevated CK R74.8       Past Medical History:        Diagnosis Date    Actinic keratoses 2013    Adenomatous polyp 2008    Atrial fibrillation ([facility] [address]     CAD (coronary artery disease) 2009    Carotid artery disease ([facility] [address] 2009    left    Hyperlipidemia 2004    Hypertension 2004    Inguinal hernia, left 2014    Unspecified cerebral artery occlusion with cerebral infarction        Past Surgical History:        Procedure Laterality Date    ABDOMEN SURGERY      ABDOMINAL ADHESION SURGERY  2012    Dr Solo Cabrera  2004    right    COLONOSCOPY      neidic    CORONARY ARTERY BYPASS GRAFT  2009    SMALL INTESTINE SURGERY      SMALL INTESTINE SURGERY  2015    Exploratory Laparotomy       Social History:    Social History     Tobacco Use    Smoking status: Former Smoker     Quit date: 1980     Years since quittin.0    Smokeless tobacco: Never Used   Substance Use Topics    Alcohol use:  Yes     Alcohol/week: 1.0 standard drinks     Types: 1 Cans of beer per week     Comment: occasionally                                Counseling given: Not Answered      Vital Signs (Current):   Vitals:    21 0800 21 1017 21 1042 21 1309   BP: 125/61 (!) 161/70 (!) 159/68 (!) 154/67   Pulse: 63 64 61 (!) 48   Resp: 18 16 16 20   Temp: 37.1 °C (98.7 °F) 37.3 °C (99.1 °F) 37.2 °C (98.9 °F)    TempSrc: Oral  Oral    SpO2: 92%      Weight:       Height:                                                  BP Readings from Last 3 Encounters:   21 (!) 154/67   21 (!) 137/53   21 132/82       NPO Status: Time of last liquid consumption: 1312(last week)                        Time of last solid consumption: (S) 0013(last week)                        Date of last liquid consumption: 21                        Date of last solid food consumption: 21    BMI:   Wt Readings from Last 3 Encounters:   21 169 lb 12.8 oz (77 kg)   01/06/21 162 lb (73.5 kg)   01/05/21 160 lb (72.6 kg)     Body mass index is 29.15 kg/m². CBC:   Lab Results   Component Value Date    WBC 7.8 01/26/2021    RBC 3.03 01/26/2021    HGB 8.2 01/26/2021    HCT 26.0 01/26/2021    MCV 85.8 01/26/2021    RDW 21.5 08/24/2016     01/26/2021       CMP:   Lab Results   Component Value Date     01/26/2021    K 3.9 01/26/2021    K 4.6 01/20/2021     01/26/2021    CO2 23 01/26/2021    BUN 14 01/26/2021    CREATININE 0.9 01/26/2021    LABGLOM 79 01/26/2021    GLUCOSE 87 01/26/2021    GLUCOSE 87 01/09/2015    PROT 5.9 01/21/2021    CALCIUM 8.1 01/26/2021    BILITOT 0.4 01/21/2021    ALKPHOS 80 01/21/2021     01/21/2021    ALT 91 01/21/2021       POC Tests: No results for input(s): POCGLU, POCNA, POCK, POCCL, POCBUN, POCHEMO, POCHCT in the last 72 hours.     Coags:   Lab Results   Component Value Date    INR 1.97 01/26/2021    APTT 42.8 01/17/2021       HCG (If Applicable): No results found for: PREGTESTUR, PREGSERUM, HCG, HCGQUANT     ABGs: No results found for: PHART, PO2ART, ZIM3LXB, ANZ4XLI, BEART, [id number]     Type & Screen (If Applicable):  Lab Results   Component Value Date    LABRH NEG 01/26/2021       Drug/Infectious Status (If Applicable):  Lab Results   Component Value Date    HEPCAB Negative 01/25/2021       COVID-19 Screening (If Applicable):   Lab Results   Component Value Date    COVID19 DETECTED 01/18/2021         Anesthesia Evaluation  Patient summary reviewed  Airway: Mallampati: II  TM distance: >3 FB     Mouth opening: > = 3 FB Dental: normal exam         Pulmonary:Negative Pulmonary ROS   (+) decreased breath sounds,                             Cardiovascular:  Exercise tolerance: poor (<4 METS),   (+) hypertension: no interval change, CAD:, CABG/stent: no interval change,         Rhythm: regular  Rate: normal                    Neuro/Psych:   (+) CVA: no interval change, neuromuscular disease:,             GI/Hepatic/Renal: yes

## 2022-11-21 NOTE — DIETITIAN INITIAL EVALUATION ADULT - OTHER CALCULATIONS
Based on Standards of Care pt within % IBW thus actual body weight used for all calculations. Needs adjusted for advanced age and malnutrition. Fluid recs per team.

## 2022-11-21 NOTE — PHYSICAL THERAPY INITIAL EVALUATION ADULT - GAIT DEVIATIONS NOTED, PT EVAL
decreased mounika/decreased step length/decreased stride length/increased stride width/decreased weight-shifting ability

## 2022-11-21 NOTE — CONSULT NOTE ADULT - SUBJECTIVE AND OBJECTIVE BOX
HISTORY OF PRESENT ILLNESS  Virginia Vicente is a 87-year-old female with a past medical history of type 2 diabetes mellitus, peripheral arterial disease, coronary artery disease (MARTINA to LAD 2022), hypothyroidism and recent R flank hematoma after diagnostic RLE angiogram who presented with urinary frequency for 1 week. Labs were significant for hyperglycemia (521 mg/dL) and urinalysis with signs of infection. She was admitted for further management of hyperglycemia and urinary tract infection. Endocrinology was consulted for recommendations regarding diabetes management.     DIABETES HISTORY  - Age at diagnosis:   - Symptoms at time of diagnosis:   - Current Therapy:  - History of other regimens:   - History of hypoglycemia:   - History of DKA/HHS:   - Complications:   - Home FSG:        > Fasting: *** mg/dL.        > Before meals: *** mg/dL.        > Bedtime: *** mg/dL.  - Diet:          > Breakfast:         > Lunch:        > Dinner:        > Snacks:  - Physical activity:    - Outpatient follow-up:     PAST MEDICAL & SURGICAL HISTORY  As per history of present illness.     FAMILY HISTORY  - Diabetes:  - Thyroid:  - Autoimmune:  - Other:    SOCIAL HISTORY  - Work:  - Alcohol:  - Smoking:  - Recreational Drugs:    ALLERGIES  No Known Allergies    CURRENT MEDICATIONS  aspirin enteric coated 81 milliGRAM(s) Oral every 24 hours  atorvastatin 40 milliGRAM(s) Oral at bedtime  cefTRIAXone   IVPB 1000 milliGRAM(s) IV Intermittent every 24 hours  cilostazol 50 milliGRAM(s) Oral every 12 hours  clopidogrel Tablet 75 milliGRAM(s) Oral every 24 hours  dextrose 5%. 1000 milliLiter(s) IV Continuous <Continuous>  dextrose 50% Injectable 25 Gram(s) IV Push once  dextrose Oral Gel 15 Gram(s) Oral once PRN  diltiazem    milliGRAM(s) Oral every 24 hours  enoxaparin Injectable 30 milliGRAM(s) SubCutaneous every 24 hours  glucagon  Injectable 1 milliGRAM(s) IntraMuscular once  insulin glargine Injectable (LANTUS) 12 Unit(s) SubCutaneous at bedtime  insulin lispro (ADMELOG) corrective regimen sliding scale   SubCutaneous Before meals and at bedtime  insulin lispro Injectable (ADMELOG) 2 Unit(s) SubCutaneous three times a day before meals  levothyroxine 75 MICROGram(s) Oral daily  losartan 50 milliGRAM(s) Oral every 24 hours  metoprolol succinate ER 25 milliGRAM(s) Oral every 24 hours  nitroglycerin    Patch 0.2 mG/Hr(s) 1 patch Transdermal daily  ranolazine 500 milliGRAM(s) Oral every 12 hours    REVIEW OF SYSTEMS  Constitutional:  Negative fever, chills or loss of appetite.  Eyes:  Negative blurry vision or double vision.  Cardiovascular:  Negative for chest pain or palpitations.  Respiratory:  Negative for cough, wheezing, or shortness of breath.   Gastrointestinal:  Negative for nausea, vomiting, diarrhea, constipation, or abdominal pain.  Genitourinary:  Negative frequency, urgency or dysuria.  Neurologic:  No headache, confusion, dizziness, lightheadedness.    PHYSICAL EXAM  Vital Signs Last 24 Hrs  T(C): 36.8 (:34), Max: 37.1 (2022 01:55)  T(F): 98.2 (:34), Max: 98.7 (2022 01:55)  HR: 84 (:34) (84 - 118)  BP: 122/72 (:34) (122/72 - 194/73)  BP(mean): --  RR: 16 (:34) (16 - 20)  SpO2: 98% (:34) (96% - 100%)    Parameters below as of :34  Patient On (Oxygen Delivery Method): room air    Constitutional: Awake, alert, in no acute distress.   HEENT: Normocephalic, atraumatic, GRISELDA, no proptosis or lid retraction.   Neck: supple, no acanthosis, no thyromegaly or palpable thyroid nodules.  Respiratory: Lungs clear to ausculation bilaterally.   Cardiovascular: regular rhythm, normal S1 and S2, no audible murmurs.   GI: soft, non-tender, non-distended, bowel sounds present, no masses appreciated.  Extremities: No lower extremity edema, peripheral pulses present.   Skin: no rashes.   Psychiatric: AAO x 3. Normal affect/mood.     LABS  CBC - WBC/HGB/HTC/PLT: 12.63/9.2/26.7/396 (22)  BMP: Na/K/Cl/Bicarb/BUN/Cr/Gluc: 135/4.0/104/23/17/0.54/97 (22)  Anion Gap: 8 (22)  eGFR: 89 (22)  Calcium: 8.4 (22)  Phosphorus: 1.9 (22)  Magnesium: 2.6 (22)  LFT - Alb/Tprot/Tbili/Dbili/AlkPhos/ALT/AST: 3.2/--/0.2/--/144/15/24 (22)  PT/aPTT/INR: 11.6/28.1/0.98 (22)  Thyroid Stimulating Hormone, Serum: 3.230 (22)  Total T4/Free T4: 6.96/-- (22)    CAPILLARY BLOOD GLUCOSE & INSULIN RECEIVED  Yesterday  - 3 PM FS mg/dL  - Dinner FS mg/dL = 6 units regular insulin IVP once.   - Bedtime FS mg/dL = 12 units of Lantus + 4 units Lispro sliding scale.     Today  - Breakfast FS mg/dL = She didn't receive insulin.     ASSESSMENT / RECOMMENDATIONS    A1C: 10.0% (per primary team, obtained from outside   BUN: 17  Creatinine: 0.54  GFR: 89  Weight: 37.6  BMI: 18.6  Ejection Fraction:     # Type 2 diabetes mellitus  - Please continue lantus *** units at bedtime.   - Continue lispro *** units before each meal.  - Continue lispro moderate / low dose sliding scale four times daily with meals and at bedtime.  - Patient's fingerstick glucose goal is 100-180 mg/dL.    - For discharge, patient can ***.    - Patient can follow up at discharge with [facility] by calling [phone] to make an appointment.      Case discussed with Dr. Cotto. Primary team updated.       Jason Majano    Endocrinology Fellow    Service Pager: [phone]  HISTORY OF PRESENT ILLNESS  Virginia Vicente is a 87-year-old female with a past medical history of type 2 diabetes mellitus, peripheral arterial disease, coronary artery disease (MARTINA to LAD 2022), hypothyroidism and recent R flank hematoma after diagnostic RLE angiogram who presented with urinary frequency for 1 week. Labs were significant for hyperglycemia (521 mg/dL) and urinalysis with signs of infection. She was admitted for further management of hyperglycemia and urinary tract infection. Endocrinology was consulted for recommendations regarding diabetes management.     DIABETES HISTORY  - Age at diagnosis:   - Symptoms at time of diagnosis:   - Current Therapy:  - History of other regimens:   - History of hypoglycemia:   - History of DKA/HHS:   - Complications:   - Home FSG:        > Fasting: *** mg/dL.        > Before meals: *** mg/dL.        > Bedtime: *** mg/dL.  - Diet:          > Breakfast:         > Lunch:        > Dinner:        > Snacks:  - Physical activity:    - Outpatient follow-up:     PAST MEDICAL & SURGICAL HISTORY  As per history of present illness.     FAMILY HISTORY  - Diabetes:  - Thyroid:  - Autoimmune:  - Other:    SOCIAL HISTORY  - Work:  - Alcohol:  - Smoking:  - Recreational Drugs:    ALLERGIES  No Known Allergies    CURRENT MEDICATIONS  aspirin enteric coated 81 milliGRAM(s) Oral every 24 hours  atorvastatin 40 milliGRAM(s) Oral at bedtime  cefTRIAXone   IVPB 1000 milliGRAM(s) IV Intermittent every 24 hours  cilostazol 50 milliGRAM(s) Oral every 12 hours  clopidogrel Tablet 75 milliGRAM(s) Oral every 24 hours  dextrose 5%. 1000 milliLiter(s) IV Continuous <Continuous>  dextrose 50% Injectable 25 Gram(s) IV Push once  dextrose Oral Gel 15 Gram(s) Oral once PRN  diltiazem    milliGRAM(s) Oral every 24 hours  enoxaparin Injectable 30 milliGRAM(s) SubCutaneous every 24 hours  glucagon  Injectable 1 milliGRAM(s) IntraMuscular once  insulin glargine Injectable (LANTUS) 12 Unit(s) SubCutaneous at bedtime  insulin lispro (ADMELOG) corrective regimen sliding scale   SubCutaneous Before meals and at bedtime  insulin lispro Injectable (ADMELOG) 2 Unit(s) SubCutaneous three times a day before meals  levothyroxine 75 MICROGram(s) Oral daily  losartan 50 milliGRAM(s) Oral every 24 hours  metoprolol succinate ER 25 milliGRAM(s) Oral every 24 hours  nitroglycerin    Patch 0.2 mG/Hr(s) 1 patch Transdermal daily  ranolazine 500 milliGRAM(s) Oral every 12 hours    REVIEW OF SYSTEMS  Constitutional:  Negative fever, chills or loss of appetite.  Eyes:  Negative blurry vision or double vision.  Cardiovascular:  Negative for chest pain or palpitations.  Respiratory:  Negative for cough, wheezing, or shortness of breath.   Gastrointestinal:  Negative for nausea, vomiting, diarrhea, constipation, or abdominal pain.  Genitourinary:  Negative frequency, urgency or dysuria.  Neurologic:  No headache, confusion, dizziness, lightheadedness.    PHYSICAL EXAM  Vital Signs Last 24 Hrs  T(C): 36.8 (:34), Max: 37.1 (2022 01:55)  T(F): 98.2 (:34), Max: 98.7 (2022 01:55)  HR: 84 (:34) (84 - 118)  BP: 122/72 (:34) (122/72 - 194/73)  BP(mean): --  RR: 16 (:34) (16 - 20)  SpO2: 98% (:34) (96% - 100%)    Parameters below as of :34  Patient On (Oxygen Delivery Method): room air    Constitutional: Awake, alert, in no acute distress.   HEENT: Normocephalic, atraumatic, GRISELDA, no proptosis or lid retraction.   Neck: supple, no acanthosis, no thyromegaly or palpable thyroid nodules.  Respiratory: Lungs clear to ausculation bilaterally.   Cardiovascular: regular rhythm, normal S1 and S2, no audible murmurs.   GI: soft, non-tender, non-distended, bowel sounds present, no masses appreciated.  Extremities: No lower extremity edema, peripheral pulses present.   Skin: no rashes.   Psychiatric: AAO x 3. Normal affect/mood.     LABS  CBC - WBC/HGB/HTC/PLT: 12.63/9.2/26.7/396 (22)  BMP: Na/K/Cl/Bicarb/BUN/Cr/Gluc: 135/4.0/104/23/17/0.54/97 (22)  Anion Gap: 8 (22)  eGFR: 89 (22)  Calcium: 8.4 (22)  Phosphorus: 1.9 (22)  Magnesium: 2.6 (22)  LFT - Alb/Tprot/Tbili/Dbili/AlkPhos/ALT/AST: 3.2/--/0.2/--/144/15/24 (22)  PT/aPTT/INR: 11.6/28.1/0.98 (22)  Thyroid Stimulating Hormone, Serum: 3.230 (22)  Total T4/Free T4: 6.96/-- (22)    CAPILLARY BLOOD GLUCOSE & INSULIN RECEIVED  Yesterday  - 3 PM FS mg/dL  - Dinner FS mg/dL = 6 units regular insulin IVP once.   - Bedtime FS mg/dL = 12 units of Lantus + 4 units Lispro sliding scale.     Today  - Breakfast FS mg/dL = She didn't receive insulin.     ASSESSMENT / RECOMMENDATIONS    A1C: 11.6%  BUN: 17  Creatinine: 0.54  GFR: 89  Weight: 37.6  BMI: 18.6    # Type 2 diabetes mellitus  - Please continue lantus *** units at bedtime.   - Continue lispro *** units before each meal.  - Continue lispro moderate / low dose sliding scale four times daily with meals and at bedtime.  - Patient's fingerstick glucose goal is 100-180 mg/dL.    - For discharge, patient can ***.    - Patient can follow up at discharge with [facility] by calling [phone] to make an appointment.      Case discussed with Dr. Cotto. Primary team updated.       Jason Majano    Endocrinology Fellow    Service Pager: [phone]  HISTORY OF PRESENT ILLNESS  Virginia Vicente is a 87-year-old female with a past medical history of type 2 diabetes mellitus, peripheral arterial disease, coronary artery disease (MARTINA to LAD 2022), hypothyroidism and recent R flank hematoma after diagnostic RLE angiogram who presented with urinary frequency for 1 week. Labs were significant for hyperglycemia (521 mg/dL) and urinalysis with signs of infection. She was admitted for further management of hyperglycemia and urinary tract infection. Endocrinology was consulted for recommendations regarding diabetes management.     DIABETES HISTORY  - Age at diagnosis: 59 years old.  - Symptoms at time of diagnosis: Asymptomatic.   - Current Therapy:  - History of other regimens:   - History of hypoglycemia:   - History of DKA/HHS:   - Complications:   - Home FSG:        > Fasting: *** mg/dL.        > Before meals: *** mg/dL.        > Bedtime: *** mg/dL.  - Diet:          > Breakfast:         > Lunch:        > Dinner:        > Snacks:  - Physical activity:    - Outpatient follow-up:     PAST MEDICAL & SURGICAL HISTORY  As per history of present illness.     FAMILY HISTORY  - Diabetes:  - Thyroid:  - Autoimmune:  - Other:    SOCIAL HISTORY  - Work:  - Alcohol:  - Smoking:  - Recreational Drugs:    ALLERGIES  No Known Allergies    CURRENT MEDICATIONS  aspirin enteric coated 81 milliGRAM(s) Oral every 24 hours  atorvastatin 40 milliGRAM(s) Oral at bedtime  cefTRIAXone   IVPB 1000 milliGRAM(s) IV Intermittent every 24 hours  cilostazol 50 milliGRAM(s) Oral every 12 hours  clopidogrel Tablet 75 milliGRAM(s) Oral every 24 hours  dextrose 5%. 1000 milliLiter(s) IV Continuous <Continuous>  dextrose 50% Injectable 25 Gram(s) IV Push once  dextrose Oral Gel 15 Gram(s) Oral once PRN  diltiazem    milliGRAM(s) Oral every 24 hours  enoxaparin Injectable 30 milliGRAM(s) SubCutaneous every 24 hours  glucagon  Injectable 1 milliGRAM(s) IntraMuscular once  insulin glargine Injectable (LANTUS) 12 Unit(s) SubCutaneous at bedtime  insulin lispro (ADMELOG) corrective regimen sliding scale   SubCutaneous Before meals and at bedtime  insulin lispro Injectable (ADMELOG) 2 Unit(s) SubCutaneous three times a day before meals  levothyroxine 75 MICROGram(s) Oral daily  losartan 50 milliGRAM(s) Oral every 24 hours  metoprolol succinate ER 25 milliGRAM(s) Oral every 24 hours  nitroglycerin    Patch 0.2 mG/Hr(s) 1 patch Transdermal daily  ranolazine 500 milliGRAM(s) Oral every 12 hours    REVIEW OF SYSTEMS  Constitutional:  Negative fever, chills or loss of appetite.  Eyes:  Negative blurry vision or double vision.  Cardiovascular:  Negative for chest pain or palpitations.  Respiratory:  Negative for cough, wheezing, or shortness of breath.   Gastrointestinal:  Negative for nausea, vomiting, diarrhea, constipation, or abdominal pain.  Genitourinary:  Negative frequency, urgency or dysuria.  Neurologic:  No headache, confusion, dizziness, lightheadedness.    PHYSICAL EXAM  Vital Signs Last 24 Hrs  T(C): 36.8 (2022 09:34), Max: 37.1 (2022 01:55)  T(F): 98.2 (2022 09:34), Max: 98.7 (2022 01:55)  HR: 84 (:34) (84 - 118)  BP: 122/72 (2022 09:34) (122/72 - 194/73)  BP(mean): --  RR: 16 (2022 09:34) (16 - 20)  SpO2: 98% (:34) (96% - 100%)    Parameters below as of 2022 09:34  Patient On (Oxygen Delivery Method): room air    Constitutional: Awake, alert, in no acute distress.   HEENT: Normocephalic, atraumatic, GRISELDA, no proptosis or lid retraction.   Neck: supple, no acanthosis, no thyromegaly or palpable thyroid nodules.  Respiratory: Lungs clear to ausculation bilaterally.   Cardiovascular: regular rhythm, normal S1 and S2, no audible murmurs.   GI: soft, non-tender, non-distended, bowel sounds present, no masses appreciated.  Extremities: No lower extremity edema, peripheral pulses present.   Skin: no rashes.   Psychiatric: AAO x 3. Normal affect/mood.     LABS  CBC - WBC/HGB/HTC/PLT: 12.63/9.2/26.7/396 (22)  BMP: Na/K/Cl/Bicarb/BUN/Cr/Gluc: 135/4.0/104/23/17/0.54/97 (22)  Anion Gap: 8 (22)  eGFR: 89 (22)  Calcium: 8.4 (22)  Phosphorus: 1.9 (22)  Magnesium: 2.6 (22)  LFT - Alb/Tprot/Tbili/Dbili/AlkPhos/ALT/AST: 3.2/--/0.2/--/144/15/24 (22)  PT/aPTT/INR: 11.6/28.1/0.98 (22)  Thyroid Stimulating Hormone, Serum: 3.230 (22)  Total T4/Free T4: 6.96/-- (22)    CAPILLARY BLOOD GLUCOSE & INSULIN RECEIVED  Yesterday  - 3 PM FS mg/dL  - Dinner FS mg/dL = 6 units regular insulin IVP once.   - Bedtime FS mg/dL = 12 units of Lantus + 4 units Lispro sliding scale.     Today  - Breakfast FS mg/dL = She didn't receive insulin.     ASSESSMENT / RECOMMENDATIONS    A1C: 11.6%  BUN: 17  Creatinine: 0.54  GFR: 89  Weight: 37.6  BMI: 18.6    # Type 2 diabetes mellitus  - Please continue lantus *** units at bedtime.   - Continue lispro *** units before each meal.  - Continue lispro moderate / low dose sliding scale four times daily with meals and at bedtime.  - Patient's fingerstick glucose goal is 100-180 mg/dL.    - For discharge, patient can ***.    - Patient can follow up at discharge with [facility] by calling [phone] to make an appointment.      Case discussed with Dr. Cotto. Primary team updated.       aJson Majano    Endocrinology Fellow    Service Pager: [phone]  HISTORY OF PRESENT ILLNESS  Virginia Vicente is a 87-year-old female with a past medical history of type 2 diabetes mellitus, peripheral arterial disease, coronary artery disease (MARTINA to LAD 2022), hypothyroidism and recent R flank hematoma after diagnostic RLE angiogram who presented with urinary frequency for 1 week. Labs were significant for hyperglycemia (521 mg/dL) and urinalysis with signs of infection. She was admitted for further management of hyperglycemia and urinary tract infection. Endocrinology was consulted for recommendations regarding diabetes management.     DIABETES HISTORY  - Age at diagnosis: 59 years old.  - Symptoms at time of diagnosis: Asymptomatic.   - Current Therapy: Metformin 500 mg ER daily and long-acting insulin 20 units at bedtime (however, she doesn't take that amount as she would go hypoglycemic). Per daughter, who administers the patient's insulin, they only check fingersticks twice per day. The fasting blood glucose is ~250 mg/dL and at bedtime ~325 mg/dL. She usually injects between 10-15 units of long-acting insulin at bedtime. She says that 15 units would bring her mothers glucose down from the 300's to ~125 mg/dL. She doesn't give her any insulin if her glucose is <150 mg/dL. In addition, if her glucose is ~150's she gives her ~5 units (but sometimes would still cause her to have hypoglycemia).   - History of other regimens: She was previously on Januvia and Actos, discontinued ~5 years ago when she was started on a long-acting insulin.   - History of hypoglycemia: ~1-2 per month.   - History of DKA/HHS: Denied.   - Complications: Denied. Last seen by ophthalmologist ~3 years ago, reports that exam was within normal limits.   - Diet:          > Breakfast: Toast + Coffee + oatmeal or [place] or cream of wheat.        > Lunch: Soup OR plantain with cod fish OR sandwich.        > Dinner: Whit rice, beans, vegetables and meat (eg, pork, chicken, etc). The rice is usually 1/4 of the plate.         > Snacks: ~4 crackers with cheese.  - Outpatient follow-up: Diana Duval (PCP).    PAST MEDICAL & SURGICAL HISTORY  As per history of present illness.     FAMILY HISTORY  - Diabetes: Daughters.   - Thyroid: Denied.     SOCIAL HISTORY  - Work: Housewife.   - Alcohol: Denied.  - Smoking: Denied.  - Recreational Drugs: Denied.    ALLERGIES  No Known Allergies    CURRENT MEDICATIONS  aspirin enteric coated 81 milliGRAM(s) Oral every 24 hours  atorvastatin 40 milliGRAM(s) Oral at bedtime  cefTRIAXone   IVPB 1000 milliGRAM(s) IV Intermittent every 24 hours  cilostazol 50 milliGRAM(s) Oral every 12 hours  clopidogrel Tablet 75 milliGRAM(s) Oral every 24 hours  dextrose 5%. 1000 milliLiter(s) IV Continuous <Continuous>  dextrose 50% Injectable 25 Gram(s) IV Push once  dextrose Oral Gel 15 Gram(s) Oral once PRN  diltiazem    milliGRAM(s) Oral every 24 hours  enoxaparin Injectable 30 milliGRAM(s) SubCutaneous every 24 hours  glucagon  Injectable 1 milliGRAM(s) IntraMuscular once  insulin glargine Injectable (LANTUS) 12 Unit(s) SubCutaneous at bedtime  insulin lispro (ADMELOG) corrective regimen sliding scale   SubCutaneous Before meals and at bedtime  insulin lispro Injectable (ADMELOG) 2 Unit(s) SubCutaneous three times a day before meals  levothyroxine 75 MICROGram(s) Oral daily  losartan 50 milliGRAM(s) Oral every 24 hours  metoprolol succinate ER 25 milliGRAM(s) Oral every 24 hours  nitroglycerin    Patch 0.2 mG/Hr(s) 1 patch Transdermal daily  ranolazine 500 milliGRAM(s) Oral every 12 hours    REVIEW OF SYSTEMS  Constitutional:  Negative fever, chills or loss of appetite.  Eyes:  Negative blurry vision or double vision.  Cardiovascular:  Negative for chest pain or palpitations.  Respiratory:  Negative for cough, wheezing, or shortness of breath.   Gastrointestinal:  Negative for nausea, vomiting, diarrhea, constipation, or abdominal pain.  Genitourinary: (+) Urinary frequency, dysuria.   Neurologic:  No headache, confusion, dizziness, lightheadedness.    PHYSICAL EXAM  Vital Signs Last 24 Hrs  T(C): 36.8 (2022 09:34), Max: 37.1 (2022 01:55)  T(F): 98.2 (2022 09:34), Max: 98.7 (2022 01:55)  HR: 84 (2022 09:34) (84 - 118)  BP: 122/72 (2022 09:34) (122/72 - 194/73)  BP(mean): --  RR: 16 (2022 09:34) (16 - 20)  SpO2: 98% (2022 09:34) (96% - 100%)    Parameters below as of 2022 09:34  Patient On (Oxygen Delivery Method): room air    Constitutional: Awake, alert, thin and frail elderly female, in no acute distress.   HEENT: Normocephalic, atraumatic, GRISELDA.  Respiratory: Lungs clear to ausculation bilaterally.   Cardiovascular: regular rhythm, normal S1 and S2, no audible murmurs.   GI: soft, mild suprapubic tenderness, non-distended, bowel sounds present.  : (+) Lewis in place.  Extremities: No lower extremity edema.  Psychiatric: AAO x 2. Normal affect/mood.     LABS  CBC - WBC/HGB/HTC/PLT: 12.63/9.2/26.7/396 (22)  BMP: Na/K/Cl/Bicarb/BUN/Cr/Gluc: 135/4.0/104/23/17/0.54/97 (22)  Anion Gap: 8 (22)  eGFR: 89 (22)  Calcium: 8.4 (22)  Phosphorus: 1.9 (22)  Magnesium: 2.6 (22)  LFT - Alb/Tprot/Tbili/Dbili/AlkPhos/ALT/AST: 3.2/--/0.2/--/144/15/24 (22)  PT/aPTT/INR: 11.6/28.1/0.98 (22)  Thyroid Stimulating Hormone, Serum: 3.230 (22)  Total T4/Free T4: 6.96/-- (22)    CAPILLARY BLOOD GLUCOSE & INSULIN RECEIVED  Yesterday  - 3 PM FS mg/dL  - Dinner FS mg/dL = 6 units regular insulin IVP once.   - Bedtime FS mg/dL = 12 units of Lantus + 4 units Lispro sliding scale.     Today  - Breakfast FS mg/dL = She didn't receive insulin.     ASSESSMENT / RECOMMENDATIONS  Ms. Vicente is a 87-year-old female with a past medical history of type 2 diabetes mellitus, peripheral arterial disease, coronary artery disease (MARTINA to LAD 2022), hypothyroidism and recent R flank hematoma after diagnostic RLE angiogram who presented with urinary frequency for 1 week. She was admitted for UTI and hyperglycemia. Endocrinology was consulted for recommendations regarding diabetes management.     A1C: 11.6%  BUN: 17  Creatinine: 0.54  GFR: 89  Weight: 37.6  BMI: 18.6    # Type 2 diabetes mellitus  - Please decrease lantus to 6 units at bedtime.   - Decrease lispro 2 units before each meal.  - Continue lispro low dose sliding scale four times daily with meals and at bedtime.  - Patient's fingerstick glucose goal is 100-180 mg/dL.    - Discharge regimen to be discussed. She will likely need a small dose of pre-meal insulin to control glucose excursions throughout the day and more frequent glucose monitoring.   - Patient can follow up at discharge with [facility] by calling [phone] to make an appointment.      Case discussed with Dr. Cotto. Primary team updated.       Jason Majano    Endocrinology Fellow    Service Pager: [phone]

## 2022-11-21 NOTE — PHYSICAL THERAPY INITIAL EVALUATION ADULT - PERTINENT HX OF CURRENT PROBLEM, REHAB EVAL
88y/o F PMH PAD, DM, CAD w MARTINA to LAD Jan 2022, PAD, recent R flank hematoma after diagnostic RLE angiogram presents w urinary frequency of 1 week and admitted for hyperglycemia. normal...

## 2022-11-21 NOTE — PHYSICAL THERAPY INITIAL EVALUATION ADULT - MANUAL MUSCLE TESTING RESULTS, REHAB EVAL
grossly at least 3+/5 throughout BUE and BLE ; Pt able to perform supine bridge and unilateral SLR on both LEs;

## 2022-11-21 NOTE — PROGRESS NOTE ADULT - PROBLEM SELECTOR PLAN 9
Mg>2, K>4  Prophylaxis: lovenox 30mg daily  Activity: Ambulate w assistance  GI: none  C: FC  Dispo: Admit to [facility].

## 2022-11-21 NOTE — PROGRESS NOTE ADULT - PROBLEM SELECTOR PLAN 5
s/p stent in LAD at [facility] in January 2022. Takes daily nitroglycerin patch which she wears during the day. Also takes ranolazine ER 500mg BID, ASA 81mg daily, plavix 75mg daily, lipitor 40mg daily. Over 6 months since stent but may have other reasons for DAPT in setting of PAD.   - will c/w above medications.

## 2022-11-21 NOTE — PROGRESS NOTE ADULT - PROBLEM SELECTOR PLAN 3
Patient more fatigued w generalized weakness likely in setting of hyperglycemia and dehydration vs infexn (UTI), could be multifactorial. Also has hx of hypothyroidism. No recent falls/trauma. No other major metabolic abnormalities besides hyperglycemia. No drug use or withdrawal, no hypoxia, EKG changes to suggest acute vascular issue, no focal deficits to suggest CVA.  - f/u TSH  - am cortisol  - treat hyperglycemia and infnxn (see plans for both)  - PT/OT. Patient more fatigued w generalized weakness likely in setting of hyperglycemia and dehydration vs infexn (UTI), could be multifactorial. Also has hx of hypothyroidism. No recent falls/trauma. No other major metabolic abnormalities besides hyperglycemia. No drug use or withdrawal, no hypoxia, EKG changes to suggest acute vascular issue, no focal deficits to suggest CVA. TSH normal.  - am cortisol  - treat hyperglycemia and infnxn (see plans for both)  - PT/OT.

## 2022-11-21 NOTE — DIETITIAN INITIAL EVALUATION ADULT - OTHER INFO
88y/o F PMH PAD, DM, CAD w MARTINA to LAD Jan 2022, PAD, recent R flank hematoma after diagnostic RLE angiogram presents w urinary frequency of 1 week and admitted for management of hyperglycemia.    Pt seen on 4UR at bedside. Pt's daughter answered the majority of RD's questions during interview, as pt was tired and resting in bed. Appetite currently poor per pt's daughter; PTA, appetite was poor. As per diet/24h recall, PTA pt consumed 3 meals per day, with snacks, which consisted of chicken, soup, fruits, vegetables, water, orange juice sometimes but pt's daughter endorsed she and her sister try to keep the "sugary drinks" away from pt. Pt's daughter stated pt does not enjoy sugar free substitutes for her coffee, thus she uses brown sugar sometimes. During current admission, consumption of 25% meals on average as noted per pt. Pt's daughter stated for the past week and 3 days (~10 days), pt has consumed very minimal PO, ~25% r/t not feeling well. Preferences: No cultural, ethnic, Mosque food preferences noted. GI: s/s within normal limits at this time per chart; No c/o N/V/D/C today per pt's daughter. Reynaldo: 20. Skin integrity: within normal limits at this time per chart. No edema noted. I/O: 0/600 (-600). Denies pain/discomfort. NKFA. No issues chewing/swallowing noted. Pertinent lab values: low H&H, elevated POCT BG from , most recent level is 109 (H); will monitor trends, insulin and diabetic medication regimen. Pt is receiving a Consistent Carbohydrate with no snacks diet. Pertinent nutrition-related medications/supplements: pt is receiving insulin, IV Abx, levothyroxine. Pt's daughter stated UBW ~95 pounds, which is/is not consistent with wt upon admission (~83 pounds). Pt's IBW is 88-92#, pt is 90-94% of IBW. Dietitian conducted nutrition focused physical exam: during NFPE, pt exhibited no s/s of muscle wasting or fat loss. Pt's daughter amenable to education; RD provided education in regards to the importance of adequate macro and micronutrients, as well as hydration to support ADLs, maintain energy levels and overall functional/nutritional status. General healthful education provided. Diabetes education provided. Discussed CHO examples, checking finger sticks, role of fiber, plate model, basic CHO counting. Reviewed healthy eating education. Pt's daughter was receptive and verbalized understanding. No additional nutrition-related concerns. RD will remain available. Additional nutrition recommendations listed below to follow.

## 2022-11-21 NOTE — CONSULT NOTE ADULT - NS ATTEST RISK GEN_ALL_CORE
From: Sloane Barajas  To: Soco Tristan DO  Sent: 2019 3:13 PM CST  Subject: Medication Question    I was just in for a check on  and Dr. Denver Lucks updated all of my prescriptions. However, when I go to my insurances webpage([facility]) and check, it says that my Met[employer] needs a renew. I do use [employer] for medication delivery, not sure if that makes a difference. Thanks,  Jorge Dominguez.  Ami Reyes  : 1964 Risk Statement (NON-critical care)

## 2022-11-21 NOTE — DIETITIAN INITIAL EVALUATION ADULT - PERSON TAUGHT/METHOD
Pt's daughter amenable to education; RD provided education in regards to the importance of adequate macro and micronutrients, as well as hydration to support ADLs, maintain energy levels and overall functional/nutritional status. General healthful education provided. Diabetes education provided. Discussed CHO examples, checking finger sticks, role of fiber, plate model, basic CHO counting. Reviewed healthy eating education. Pt's daughter was receptive and verbalized understanding. No additional nutrition-related concerns. RD will remain available. Additional nutrition recommendations listed below to follow./verbal instruction/written material/daughter instructed

## 2022-11-21 NOTE — DIETITIAN INITIAL EVALUATION ADULT - NUTRITIONGOAL OUTCOME1
Pt to consistently meet at least 75% of EEE via tolerated route that is consistent with GOC during hospital stay; pt will no longer exhibit s/s of malnutrition during hospital stay

## 2022-11-21 NOTE — DIETITIAN INITIAL EVALUATION ADULT - PERTINENT LABORATORY DATA
11-21    135  |  104  |  17  ----------------------------<  97  4.0   |  23  |  0.54    Ca    8.4      21 Nov 2022 08:43  Phos  1.9     11-21  Mg     2.6     11-21    TPro  8.2  /  Alb  3.2<L>  /  TBili  0.2  /  DBili  x   /  AST  24  /  ALT  15  /  AlkPhos  144<H>  11-20  POCT Blood Glucose.: 79 mg/dL (11-21-22 @ 08:30)  A1C with Estimated Average Glucose Result: 11.6 % (11-21-22 @ 08:43)

## 2022-11-21 NOTE — PROGRESS NOTE ADULT - PROBLEM SELECTOR PLAN 1
F/s at home in 500s, 521 in ED -> 310 after 6 U regular insulin -> 139 11/21 AM. U/A showed urinary ketones and urinary glucose, but currently has no acidosis or tachypnea. At home on 12U lantus at night and metformin ER 500mg qd. Last A1c 10.0 10/20/22.  - 2x fingerstick overnight w sliding scale   - c/w 12 U lantus at night  - will start lispro 2U premeal   - consistent carb diet  - am cortisol  - f/ tsh  - endocrine in AM  - cassidy catheter, I/Os. F/s at home in 500s, 521 in ED -> 310 after 6 U regular insulin -> 79 11/21 AM. U/A showed urinary ketones and urinary glucose, but currently has no acidosis or tachypnea. At home on 12U lantus at night and metformin ER 500mg qd. Last A1c 10.0 10/20/22. TSH normal.  - 2x fingerstick overnight w sliding scale   - c/w 12 U lantus at night  - will start lispro 2U premeal   - consistent carb diet  - am cortisol  - endocrine in AM  - cassidy catheter, I/Os.

## 2022-11-21 NOTE — PHYSICAL THERAPY INITIAL EVALUATION ADULT - IMPAIRMENTS FOUND, PT EVAL
aerobic capacity/endurance/ergonomics and body mechanics/gait, locomotion, and balance/gross motor/muscle strength/poor safety awareness/ROM/ventilation and respiration/gas exchange

## 2022-11-21 NOTE — DIETITIAN NUTRITION RISK NOTIFICATION - TREATMENT: THE FOLLOWING DIET HAS BEEN RECOMMENDED
Diet, Consistent Carbohydrate w/Evening Snack:   Supplement Feeding Modality:  Oral  Ensure Max Cans or Servings Per Day:  1       Frequency:  Three Times a day (11-21-22 @ 12:54) [Pending Verification By Attending]

## 2022-11-22 ENCOUNTER — TRANSCRIPTION ENCOUNTER (OUTPATIENT)
Age: 87
End: 2022-11-22

## 2022-11-22 DIAGNOSIS — E11.9 TYPE 2 DIABETES MELLITUS WITHOUT COMPLICATIONS: ICD-10-CM

## 2022-11-22 DIAGNOSIS — E87.1 HYPO-OSMOLALITY AND HYPONATREMIA: ICD-10-CM

## 2022-11-22 LAB
ALBUMIN SERPL ELPH-MCNC: 2.6 G/DL — LOW (ref 3.3–5)
ALP SERPL-CCNC: 96 U/L — SIGNIFICANT CHANGE UP (ref 40–120)
ALT FLD-CCNC: 10 U/L — SIGNIFICANT CHANGE UP (ref 10–45)
ANION GAP SERPL CALC-SCNC: 8 MMOL/L — SIGNIFICANT CHANGE UP (ref 5–17)
ANION GAP SERPL CALC-SCNC: 8 MMOL/L — SIGNIFICANT CHANGE UP (ref 5–17)
APPEARANCE UR: CLEAR — SIGNIFICANT CHANGE UP
AST SERPL-CCNC: 14 U/L — SIGNIFICANT CHANGE UP (ref 10–40)
BACTERIA # UR AUTO: PRESENT /HPF
BASOPHILS # BLD AUTO: 0.05 K/UL — SIGNIFICANT CHANGE UP (ref 0–0.2)
BASOPHILS NFR BLD AUTO: 0.4 % — SIGNIFICANT CHANGE UP (ref 0–2)
BILIRUB SERPL-MCNC: <0.2 MG/DL — SIGNIFICANT CHANGE UP (ref 0.2–1.2)
BILIRUB UR-MCNC: NEGATIVE — SIGNIFICANT CHANGE UP
BUN SERPL-MCNC: 20 MG/DL — SIGNIFICANT CHANGE UP (ref 7–23)
BUN SERPL-MCNC: 26 MG/DL — HIGH (ref 7–23)
CALCIUM SERPL-MCNC: 8.4 MG/DL — SIGNIFICANT CHANGE UP (ref 8.4–10.5)
CALCIUM SERPL-MCNC: 8.5 MG/DL — SIGNIFICANT CHANGE UP (ref 8.4–10.5)
CHLORIDE SERPL-SCNC: 100 MMOL/L — SIGNIFICANT CHANGE UP (ref 96–108)
CHLORIDE SERPL-SCNC: 99 MMOL/L — SIGNIFICANT CHANGE UP (ref 96–108)
CO2 SERPL-SCNC: 23 MMOL/L — SIGNIFICANT CHANGE UP (ref 22–31)
CO2 SERPL-SCNC: 23 MMOL/L — SIGNIFICANT CHANGE UP (ref 22–31)
COLOR SPEC: YELLOW — SIGNIFICANT CHANGE UP
COMMENT - URINE: SIGNIFICANT CHANGE UP
CORTIS AM PEAK SERPL-MCNC: 11.27 UG/DL — SIGNIFICANT CHANGE UP (ref 6.02–18.4)
CREAT ?TM UR-MCNC: 43 MG/DL — SIGNIFICANT CHANGE UP
CREAT SERPL-MCNC: 0.61 MG/DL — SIGNIFICANT CHANGE UP (ref 0.5–1.3)
CREAT SERPL-MCNC: 0.63 MG/DL — SIGNIFICANT CHANGE UP (ref 0.5–1.3)
CULTURE RESULTS: SIGNIFICANT CHANGE UP
DIFF PNL FLD: NEGATIVE — SIGNIFICANT CHANGE UP
EGFR: 86 ML/MIN/1.73M2 — SIGNIFICANT CHANGE UP
EGFR: 86 ML/MIN/1.73M2 — SIGNIFICANT CHANGE UP
EOSINOPHIL # BLD AUTO: 0.24 K/UL — SIGNIFICANT CHANGE UP (ref 0–0.5)
EOSINOPHIL NFR BLD AUTO: 2 % — SIGNIFICANT CHANGE UP (ref 0–6)
EPI CELLS # UR: SIGNIFICANT CHANGE UP /HPF (ref 0–5)
FERRITIN SERPL-MCNC: 222 NG/ML — HIGH (ref 15–150)
GLUCOSE BLDC GLUCOMTR-MCNC: 146 MG/DL — HIGH (ref 70–99)
GLUCOSE BLDC GLUCOMTR-MCNC: 205 MG/DL — HIGH (ref 70–99)
GLUCOSE BLDC GLUCOMTR-MCNC: 214 MG/DL — HIGH (ref 70–99)
GLUCOSE BLDC GLUCOMTR-MCNC: 279 MG/DL — HIGH (ref 70–99)
GLUCOSE SERPL-MCNC: 145 MG/DL — HIGH (ref 70–99)
GLUCOSE SERPL-MCNC: 216 MG/DL — HIGH (ref 70–99)
GLUCOSE UR QL: 250
HCT VFR BLD CALC: 25 % — LOW (ref 34.5–45)
HCT VFR BLD CALC: 28.6 % — LOW (ref 34.5–45)
HGB BLD-MCNC: 8.4 G/DL — LOW (ref 11.5–15.5)
HGB BLD-MCNC: 9.4 G/DL — LOW (ref 11.5–15.5)
IMM GRANULOCYTES NFR BLD AUTO: 1.1 % — HIGH (ref 0–0.9)
IRON SATN MFR SERPL: 19 % — SIGNIFICANT CHANGE UP (ref 14–50)
IRON SATN MFR SERPL: 35 UG/DL — SIGNIFICANT CHANGE UP (ref 30–160)
KETONES UR-MCNC: NEGATIVE — SIGNIFICANT CHANGE UP
LEUKOCYTE ESTERASE UR-ACNC: ABNORMAL
LYMPHOCYTES # BLD AUTO: 18.3 % — SIGNIFICANT CHANGE UP (ref 13–44)
LYMPHOCYTES # BLD AUTO: 2.22 K/UL — SIGNIFICANT CHANGE UP (ref 1–3.3)
MAGNESIUM SERPL-MCNC: 2 MG/DL — SIGNIFICANT CHANGE UP (ref 1.6–2.6)
MCHC RBC-ENTMCNC: 30.3 PG — SIGNIFICANT CHANGE UP (ref 27–34)
MCHC RBC-ENTMCNC: 31.1 PG — SIGNIFICANT CHANGE UP (ref 27–34)
MCHC RBC-ENTMCNC: 32.9 GM/DL — SIGNIFICANT CHANGE UP (ref 32–36)
MCHC RBC-ENTMCNC: 33.6 GM/DL — SIGNIFICANT CHANGE UP (ref 32–36)
MCV RBC AUTO: 90.3 FL — SIGNIFICANT CHANGE UP (ref 80–100)
MCV RBC AUTO: 94.7 FL — SIGNIFICANT CHANGE UP (ref 80–100)
MONOCYTES # BLD AUTO: 1.08 K/UL — HIGH (ref 0–0.9)
MONOCYTES NFR BLD AUTO: 8.9 % — SIGNIFICANT CHANGE UP (ref 2–14)
NEUTROPHILS # BLD AUTO: 8.4 K/UL — HIGH (ref 1.8–7.4)
NEUTROPHILS NFR BLD AUTO: 69.3 % — SIGNIFICANT CHANGE UP (ref 43–77)
NITRITE UR-MCNC: NEGATIVE — SIGNIFICANT CHANGE UP
NRBC # BLD: 0 /100 WBCS — SIGNIFICANT CHANGE UP (ref 0–0)
NRBC # BLD: 0 /100 WBCS — SIGNIFICANT CHANGE UP (ref 0–0)
OSMOLALITY UR: 409 MOSM/KG — SIGNIFICANT CHANGE UP (ref 300–900)
PH UR: 5.5 — SIGNIFICANT CHANGE UP (ref 5–8)
PHOSPHATE SERPL-MCNC: 2.4 MG/DL — LOW (ref 2.5–4.5)
PLATELET # BLD AUTO: 403 K/UL — HIGH (ref 150–400)
PLATELET # BLD AUTO: 432 K/UL — HIGH (ref 150–400)
POTASSIUM SERPL-MCNC: 4.3 MMOL/L — SIGNIFICANT CHANGE UP (ref 3.5–5.3)
POTASSIUM SERPL-MCNC: 4.4 MMOL/L — SIGNIFICANT CHANGE UP (ref 3.5–5.3)
POTASSIUM SERPL-SCNC: 4.3 MMOL/L — SIGNIFICANT CHANGE UP (ref 3.5–5.3)
POTASSIUM SERPL-SCNC: 4.4 MMOL/L — SIGNIFICANT CHANGE UP (ref 3.5–5.3)
POTASSIUM UR-SCNC: 46 MMOL/L — SIGNIFICANT CHANGE UP
PROT ?TM UR-MCNC: 94 MG/DL — HIGH (ref 0–12)
PROT SERPL-MCNC: 6.1 G/DL — SIGNIFICANT CHANGE UP (ref 6–8.3)
PROT UR-MCNC: 30 MG/DL
PROT/CREAT UR-RTO: 2.2 RATIO — HIGH (ref 0–0.2)
RBC # BLD: 2.77 M/UL — LOW (ref 3.8–5.2)
RBC # BLD: 3.02 M/UL — LOW (ref 3.8–5.2)
RBC # BLD: 3.02 M/UL — LOW (ref 3.8–5.2)
RBC # FLD: 13.5 % — SIGNIFICANT CHANGE UP (ref 10.3–14.5)
RBC # FLD: 13.7 % — SIGNIFICANT CHANGE UP (ref 10.3–14.5)
RBC CASTS # UR COMP ASSIST: < 5 /HPF — SIGNIFICANT CHANGE UP
RETICS #: 27.8 K/UL — SIGNIFICANT CHANGE UP (ref 25–125)
RETICS/RBC NFR: 0.9 % — SIGNIFICANT CHANGE UP (ref 0.5–2.5)
SODIUM SERPL-SCNC: 130 MMOL/L — LOW (ref 135–145)
SODIUM SERPL-SCNC: 131 MMOL/L — LOW (ref 135–145)
SODIUM UR-SCNC: 47 MMOL/L — SIGNIFICANT CHANGE UP
SP GR SPEC: 1.02 — SIGNIFICANT CHANGE UP (ref 1–1.03)
SPECIMEN SOURCE: SIGNIFICANT CHANGE UP
T3 SERPL-MCNC: 51 NG/DL — LOW (ref 80–200)
TIBC SERPL-MCNC: 181 UG/DL — LOW (ref 220–430)
TRANSFERRIN SERPL-MCNC: 169 MG/DL — LOW (ref 200–360)
UIBC SERPL-MCNC: 146 UG/DL — SIGNIFICANT CHANGE UP (ref 110–370)
UROBILINOGEN FLD QL: 0.2 E.U./DL — SIGNIFICANT CHANGE UP
UUN UR-MCNC: 533 MG/DL — SIGNIFICANT CHANGE UP
WBC # BLD: 10.26 K/UL — SIGNIFICANT CHANGE UP (ref 3.8–10.5)
WBC # BLD: 12.12 K/UL — HIGH (ref 3.8–10.5)
WBC # FLD AUTO: 10.26 K/UL — SIGNIFICANT CHANGE UP (ref 3.8–10.5)
WBC # FLD AUTO: 12.12 K/UL — HIGH (ref 3.8–10.5)
WBC UR QL: ABNORMAL /HPF

## 2022-11-22 PROCEDURE — 99231 SBSQ HOSP IP/OBS SF/LOW 25: CPT

## 2022-11-22 PROCEDURE — 99232 SBSQ HOSP IP/OBS MODERATE 35: CPT

## 2022-11-22 RX ORDER — INSULIN LISPRO 100/ML
4 VIAL (ML) SUBCUTANEOUS
Refills: 0 | Status: DISCONTINUED | OUTPATIENT
Start: 2022-11-22 | End: 2022-11-23

## 2022-11-22 RX ORDER — LACTULOSE 10 G/15ML
10 SOLUTION ORAL ONCE
Refills: 0 | Status: COMPLETED | OUTPATIENT
Start: 2022-11-22 | End: 2022-11-22

## 2022-11-22 RX ORDER — SENNA PLUS 8.6 MG/1
2 TABLET ORAL AT BEDTIME
Refills: 0 | Status: DISCONTINUED | OUTPATIENT
Start: 2022-11-22 | End: 2022-11-23

## 2022-11-22 RX ORDER — POLYETHYLENE GLYCOL 3350 17 G/17G
17 POWDER, FOR SOLUTION ORAL EVERY 24 HOURS
Refills: 0 | Status: DISCONTINUED | OUTPATIENT
Start: 2022-11-22 | End: 2022-11-23

## 2022-11-22 RX ADMIN — POLYETHYLENE GLYCOL 3350 17 GRAM(S): 17 POWDER, FOR SOLUTION ORAL at 11:50

## 2022-11-22 RX ADMIN — Medication 3: at 17:24

## 2022-11-22 RX ADMIN — RANOLAZINE 500 MILLIGRAM(S): 500 TABLET, FILM COATED, EXTENDED RELEASE ORAL at 11:07

## 2022-11-22 RX ADMIN — ENOXAPARIN SODIUM 30 MILLIGRAM(S): 100 INJECTION SUBCUTANEOUS at 07:10

## 2022-11-22 RX ADMIN — Medication 1 TABLET(S): at 11:07

## 2022-11-22 RX ADMIN — LOSARTAN POTASSIUM 50 MILLIGRAM(S): 100 TABLET, FILM COATED ORAL at 13:43

## 2022-11-22 RX ADMIN — Medication 4 UNIT(S): at 17:24

## 2022-11-22 RX ADMIN — CILOSTAZOL 50 MILLIGRAM(S): 100 TABLET ORAL at 11:07

## 2022-11-22 RX ADMIN — Medication 1 PATCH: at 22:34

## 2022-11-22 RX ADMIN — RANOLAZINE 500 MILLIGRAM(S): 500 TABLET, FILM COATED, EXTENDED RELEASE ORAL at 00:11

## 2022-11-22 RX ADMIN — Medication 2 UNIT(S): at 08:45

## 2022-11-22 RX ADMIN — Medication 1 PATCH: at 11:30

## 2022-11-22 RX ADMIN — INSULIN GLARGINE 6 UNIT(S): 100 INJECTION, SOLUTION SUBCUTANEOUS at 22:34

## 2022-11-22 RX ADMIN — CEFTRIAXONE 100 MILLIGRAM(S): 500 INJECTION, POWDER, FOR SOLUTION INTRAMUSCULAR; INTRAVENOUS at 19:07

## 2022-11-22 RX ADMIN — Medication 120 MILLIGRAM(S): at 19:07

## 2022-11-22 RX ADMIN — CLOPIDOGREL BISULFATE 75 MILLIGRAM(S): 75 TABLET, FILM COATED ORAL at 19:07

## 2022-11-22 RX ADMIN — LACTULOSE 10 GRAM(S): 10 SOLUTION ORAL at 13:43

## 2022-11-22 RX ADMIN — Medication 25 MILLIGRAM(S): at 19:07

## 2022-11-22 RX ADMIN — Medication 2: at 22:33

## 2022-11-22 RX ADMIN — Medication 75 MICROGRAM(S): at 07:10

## 2022-11-22 RX ADMIN — CILOSTAZOL 50 MILLIGRAM(S): 100 TABLET ORAL at 00:10

## 2022-11-22 RX ADMIN — Medication 62.5 MILLIMOLE(S): at 09:34

## 2022-11-22 RX ADMIN — ATORVASTATIN CALCIUM 40 MILLIGRAM(S): 80 TABLET, FILM COATED ORAL at 22:35

## 2022-11-22 RX ADMIN — Medication 2 UNIT(S): at 12:16

## 2022-11-22 RX ADMIN — Medication 1 ENEMA: at 19:07

## 2022-11-22 RX ADMIN — Medication 81 MILLIGRAM(S): at 11:07

## 2022-11-22 RX ADMIN — Medication 2: at 12:16

## 2022-11-22 NOTE — PROGRESS NOTE ADULT - PROBLEM SELECTOR PLAN 4
HTN in ED to 194/73, improved after restarting on home meds.  - c/w home medications. Patient more fatigued w generalized weakness likely in setting of hyperglycemia and dehydration vs infexn (UTI), could be multifactorial. Also has hx of hypothyroidism. No recent falls/trauma. No other major metabolic abnormalities besides hyperglycemia. No drug use or withdrawal, no hypoxia, EKG changes to suggest acute vascular issue, no focal deficits to suggest CVA. TSH normal, T3 51, AM cortisol normal.  - treat hyperglycemia and infnxn (see plans for both)  - PT/OT.

## 2022-11-22 NOTE — PROGRESS NOTE ADULT - PROBLEM SELECTOR PLAN 9
Mg>2, K>4  Prophylaxis: lovenox 30mg daily  Activity: Ambulate w assistance  GI: none  C: FC  Dispo: Admit to [facility]. Takes cilosatazol 50mg BID. No leg pain currently. Lower extremities wwp.   - c/w home med.

## 2022-11-22 NOTE — PROGRESS NOTE ADULT - PROBLEM SELECTOR PLAN 1
- Please continue lantus 6 units at bedtime.   - Increase lispro to 4 units before each meal.  - Continue lispro low dose sliding scale four times daily with meals and at bedtime.  - Patient's fingerstick glucose goal is 100-180 mg/dL.    - Discharge regimen to be discussed. She will likely need a small dose of pre-meal insulin to control glucose excursions throughout the day and stop metformin + more frequent glucose monitoring. C-peptide pending tomorrow.  - Patient can follow up at discharge with [facility] by calling [phone] to make an appointment.  Please make f/u appt.    Case discussed with Dr. Cotto. Primary team updated.

## 2022-11-22 NOTE — DISCHARGE NOTE PROVIDER - ATTENDING DISCHARGE PHYSICAL EXAMINATION:
87F with PMH of urinary retention, DM, PAD, CAD and recent flank hematoma presenting with lethargy, hyperglycemia, and likely failed outpatient treatment of UTI.     Physical exam:  General: no acute distress, very tired (per daughter) as she has not slept much over the past few days  HEENT: normocephalic, atraumatic, mildly dry mucous membranes, nonicteric sclera  Cards: RRR, normal S1/S2, no murmurs, rubs or gallops  Pulm: CTAB, no wheeze, crackles, rales or rhonchi  Ab: soft, nontender, nondistended, normoactive bowel sounds  : cassidy catheter in place, draining clear yellow urine  Neuro: grossly nonfocal exam, opens eyes to name and follows commands  Ext: wwp, no edema, erythema or tenderness  Skin: warm and dry, no obvious rashes or lesions present    - passed TOV, and had bowel movement yesterday.  Recommended that she take stool softeners outpatient, which can easily be purchased over the counter.   - WBC count is normalized.  Completed three days of IV antibiotics.  - Anemia is stable.  Confirmed with daughter that the vascular surgeon restarted her on all vascular medications.   - will be discharging on insulin.  Per discussion with daughter, outpatient endocrinologist had tried a few different oral medications outpatient, but had finally settled on metformin and insulin

## 2022-11-22 NOTE — PROGRESS NOTE ADULT - PROBLEM SELECTOR PLAN 8
Takes cilosatazol 50mg BID. No leg pain currently. Lower extremities wwp.   - c/w home med. Takes synthroid 75mcg daily. TSH normal, T3 51.  - c/w synthroid 75 mcg daily

## 2022-11-22 NOTE — DISCHARGE NOTE PROVIDER - NSDCFUADDAPPT_GEN_ALL_CORE_FT
Please call [facility]  at [phone] to make a follow-up appointment with our endocrinologists.

## 2022-11-22 NOTE — DISCHARGE NOTE PROVIDER - CARE PROVIDER_API CALL
Cheryl Duval  [address] [place] [id number]  Phone: [phone]  Fax: (   )    -  Established Patient  Scheduled Appointment: 12/07/2022 11:30 AM

## 2022-11-22 NOTE — PROGRESS NOTE ADULT - PROBLEM SELECTOR PLAN 7
Takes synthroid 75mcg daily. TSH normal, T3 51.  - c/w synthroid 75 mcg daily Urinary retention on last admission for which she followed up with urology outpatient and they suspected it is due to her DM. She has increased urgency. Lewis catheter removed.  - f/u TOV

## 2022-11-22 NOTE — DISCHARGE NOTE PROVIDER - NSDCMRMEDTOKEN_GEN_ALL_CORE_FT
aspirin 81 mg oral tablet, chewable: 1 tab(s) orally once a day  atorvastatin 40 mg oral tablet: 1 tab(s) orally once a day (at bedtime)  cilostazol 50 mg oral tablet: 1 tab(s) orally 2 times a day  clopidogrel 75 mg oral tablet: 1 tab(s) orally once a day  dilTIAZem 120 mg/24 hours oral capsule, extended release: 1 cap(s) orally every 24 hours  insulin glargine 100 units/mL subcutaneous solution: 12 unit(s) subcutaneous once a day (at bedtime)  levothyroxine 75 mcg (0.075 mg) oral tablet: 1 tab(s) orally once a day  metFORMIN 500 mg oral tablet, extended release: 1 tab(s) orally once a day  metoprolol succinate 25 mg oral tablet, extended release: 1 tab(s) orally every 24 hours  nitroglycerin 0.2 mg/hr transdermal film, extended release: 1 patch transdermal once a day  ranolazine 500 mg oral tablet, extended release: 1 tab(s) orally 2 times a day  telmisartan 40 mg oral tablet: 1 tab(s) orally once a day   Admelog 100 units/mL injectable solution: 4 unit(s) subcutaneous 3 times a day (with meals)   alcohol swabs : Apply topically to affected area 4 times a day   aspirin 81 mg oral tablet, chewable: 1 tab(s) orally once a day  atorvastatin 40 mg oral tablet: 1 tab(s) orally once a day (at bedtime)  cilostazol 50 mg oral tablet: 1 tab(s) orally 2 times a day  clopidogrel 75 mg oral tablet: 1 tab(s) orally once a day  dilTIAZem 120 mg/24 hours oral capsule, extended release: 1 cap(s) orally every 24 hours  insulin glargine 100 units/mL subcutaneous solution: 6 unit(s) subcutaneous once a day (at bedtime)  Insulin Pen Needles, 4mm: 1 application subcutaneously 4 times a day. ** Use with insulin pen **   lancets: 1 application subcutaneously 4 times a day   levothyroxine 75 mcg (0.075 mg) oral tablet: 1 tab(s) orally once a day  metoprolol succinate 25 mg oral tablet, extended release: 1 tab(s) orally every 24 hours  nitroglycerin 0.2 mg/hr transdermal film, extended release: 1 patch transdermal once a day  polyethylene glycol 3350 oral powder for reconstitution: 17 gram(s) orally every 24 hours  ranolazine 500 mg oral tablet, extended release: 1 tab(s) orally 2 times a day  senna leaf extract oral tablet: 2 tab(s) orally once a day (at bedtime)  telmisartan 40 mg oral tablet: 1 tab(s) orally once a day   alcohol swabs : Apply topically to affected area 4 times a day   aspirin 81 mg oral tablet, chewable: 1 tab(s) orally once a day  atorvastatin 40 mg oral tablet: 1 tab(s) orally once a day (at bedtime)  cilostazol 50 mg oral tablet: 1 tab(s) orally 2 times a day  clopidogrel 75 mg oral tablet: 1 tab(s) orally once a day  dilTIAZem 120 mg/24 hours oral capsule, extended release: 1 cap(s) orally every 24 hours  insulin glargine 100 units/mL subcutaneous solution: 6 unit(s) subcutaneous once a day (at bedtime)  Insulin Pen Needles, 4mm: 1 application subcutaneously 4 times a day. ** Use with insulin pen **   lancets: 1 application subcutaneously 4 times a day   levothyroxine 75 mcg (0.075 mg) oral tablet: 1 tab(s) orally once a day  metoprolol succinate 25 mg oral tablet, extended release: 1 tab(s) orally every 24 hours  nitroglycerin 0.2 mg/hr transdermal film, extended release: 1 patch transdermal once a day  NovoLOG FlexPen 100 units/mL injectable solution: 4 unit(s) subcutaneous 3 times a day (with meals)   polyethylene glycol 3350 oral powder for reconstitution: 17 gram(s) orally every 24 hours  ranolazine 500 mg oral tablet, extended release: 1 tab(s) orally 2 times a day  senna leaf extract oral tablet: 2 tab(s) orally once a day (at bedtime)  telmisartan 40 mg oral tablet: 1 tab(s) orally once a day   alcohol swabs : Apply topically to affected area 4 times a day   aspirin 81 mg oral tablet, chewable: 1 tab(s) orally once a day  atorvastatin 40 mg oral tablet: 1 tab(s) orally once a day (at bedtime)  cilostazol 50 mg oral tablet: 1 tab(s) orally 2 times a day  clopidogrel 75 mg oral tablet: 1 tab(s) orally once a day  dilTIAZem 120 mg/24 hours oral capsule, extended release: 1 cap(s) orally every 24 hours  glucometer (per patient&#x27;s insurance): Test blood sugars four times a day. Dispense #1 glucometer.  insulin glargine 100 units/mL subcutaneous solution: 6 unit(s) subcutaneous once a day (at bedtime)  Insulin Pen Needles, 4mm: 1 application subcutaneously 4 times a day. ** Use with insulin pen **   Insulin Pen Needles, 4mm: 1 application subcutaneously 4 times a day. ** Use with insulin pen **   lancets: 1 application subcutaneously 4 times a day   levothyroxine 75 mcg (0.075 mg) oral tablet: 1 tab(s) orally once a day  metoprolol succinate 25 mg oral tablet, extended release: 1 tab(s) orally every 24 hours  nitroglycerin 0.2 mg/hr transdermal film, extended release: 1 patch transdermal once a day  NovoLOG FlexPen 100 units/mL injectable solution: 4 unit(s) subcutaneous 3 times a day (with meals)   polyethylene glycol 3350 oral powder for reconstitution: 17 gram(s) orally every 24 hours  ranolazine 500 mg oral tablet, extended release: 1 tab(s) orally 2 times a day  senna leaf extract oral tablet: 2 tab(s) orally once a day (at bedtime)  telmisartan 40 mg oral tablet: 1 tab(s) orally once a day  test strips (per patient&#x27;s insurance): 1 application subcutaneously 4 times a day. ** Compatible with patient&#x27;s glucometer **   alcohol swabs : Apply topically to affected area 4 times a day   aspirin 81 mg oral tablet, chewable: 1 tab(s) orally once a day  atorvastatin 40 mg oral tablet: 1 tab(s) orally once a day (at bedtime)  cilostazol 50 mg oral tablet: 1 tab(s) orally 2 times a day  clopidogrel 75 mg oral tablet: 1 tab(s) orally once a day  dilTIAZem 120 mg/24 hours oral capsule, extended release: 1 cap(s) orally every 24 hours  glucometer (per patient&#x27;s insurance): Test blood sugars four times a day. Dispense #1 glucometer.  insulin glargine 100 units/mL subcutaneous solution: 6 unit(s) subcutaneous once a day (at bedtime)  Insulin Pen Needles, 4mm: 1 application subcutaneously 4 times a day. ** Use with insulin pen **   Insulin Pen Needles, 4mm: 1 application subcutaneously 4 times a day. ** Use with insulin pen **   lancets: 1 application subcutaneously 4 times a day   levothyroxine 75 mcg (0.075 mg) oral tablet: 1 tab(s) orally once a day  metoprolol succinate 25 mg oral tablet, extended release: 1 tab(s) orally every 24 hours  nitroglycerin 0.2 mg/hr transdermal film, extended release: 1 patch transdermal once a day  NovoLOG FlexPen 100 units/mL injectable solution: 5 unit(s) subcutaneous 3 times a day (with meals)   polyethylene glycol 3350 oral powder for reconstitution: 17 gram(s) orally every 24 hours  ranolazine 500 mg oral tablet, extended release: 1 tab(s) orally 2 times a day  senna leaf extract oral tablet: 2 tab(s) orally once a day (at bedtime)  telmisartan 40 mg oral tablet: 1 tab(s) orally once a day  test strips (per patient&#x27;s insurance): 1 application subcutaneously 4 times a day. ** Compatible with patient&#x27;s glucometer **

## 2022-11-22 NOTE — DISCHARGE NOTE PROVIDER - NSDCCPCAREPLAN_GEN_ALL_CORE_FT
PRINCIPAL DISCHARGE DIAGNOSIS  Diagnosis: Hyperglycemia  Assessment and Plan of Treatment: Hyperglycemia occurs when the level of sugar (glucose) in the blood is too high. Severe hyperglycemia is a medical emergency.  For most people with diabetes, a blood glucose level above 240 mg/dL is considered hyperglycemia. If you have diabetes, hyperglycemia may be caused by: Medicines that increase blood glucose or affect your diabetes control. Getting less physical activity. Eating more than planned. Being sick or injured, having an infection, or having surgery. Stress. Not giving yourself enough insulin. Hyperglycemia may not cause any symptoms. If you do have symptoms, they may include: Increased thirst. Needing to urinate more often than usual. Hunger. Feeling very tired. Blurry vision. Dry mouth. Abdominal pain. Loss of appetite. Fruity-smelling breath. Weakness. Unexpected weight loss. Tingling or numbness in the hands or feet. Headache. Cuts or bruises that are slow to heal. Treatment may include: Taking medicine to regulate your blood glucose levels. If you take insulin or other diabetes medicines, your medicine or dosage may be adjusted. Lifestyle changes, such as exercising more, eating healthier foods, or losing weight. Treating an illness or infection. Checking your blood glucose more often. Make sure you: Take your insulin and medicines as told. Follow your meal plan. Eat on time, and do not skip meals. Check your blood glucose as often as told.  Carry a medical alert card or wear medical alert jewelry. Contact a health care provider if: Your blood glucose is at or above 240 mg/dL (13.3 mmol/L) for 2 days in a row. You have problems keeping your blood glucose in your target range. You have frequent episodes of hyperglycemia. You have signs of illness, such as nausea, vomiting, or fever. Get help right away if: Your blood glucose monitor reads "high" even when you are taking insulin. You have trouble breathing. You have a change in how you think, feel, or act (mental status). You have nausea or vomiting that does not go away.       PRINCIPAL DISCHARGE DIAGNOSIS  Diagnosis: Hyperglycemia  Assessment and Plan of Treatment: You have a known history of diabetes mellitus prior to your admission. This condition results from blood sugar levels getting too high because your body is more resistant to insulin. Uncontrolled blood sugar levels can lead to kidney and heart damage, pain/numbness/paralysis in your hands and feet, and increased rates of infections.  To manage this you are on a medication called Insulin. Please continue to give Lantus 6U at bedtime and Lispro which is short acting insulin 4U three times per day with meals. You should be checking your blood glucose 4 times per day. It is important that you continue to take this medication when you are discharged so that you can continue to control your blood sugar levels. Additionally be sure to follow up with your primary care physician, podiatrist, and ophthalmologist on a regular basis.         PRINCIPAL DISCHARGE DIAGNOSIS  Diagnosis: Hyperglycemia  Assessment and Plan of Treatment: You have a known history of diabetes mellitus prior to your admission. This condition results from blood sugar levels getting too high because your body is more resistant to insulin. Uncontrolled blood sugar levels can lead to kidney and heart damage, pain/numbness/paralysis in your hands and feet, and increased rates of infections.  To manage this you are on a medication called Insulin. Please continue to give Lantus 6U at bedtime and Lispro which is short acting insulin 5U three times per day with meals. You should be checking your blood glucose 4 times per day. It is important that you continue to take this medication when you are discharged so that you can continue to control your blood sugar levels. Additionally be sure to follow up with your primary care physician, podiatrist, and ophthalmologist on a regular basis.

## 2022-11-22 NOTE — PROGRESS NOTE ADULT - PROBLEM SELECTOR PLAN 6
Urinary retention on last admission for which she followed up with urology outpatient and they suspected it is due to her DM. She has increased urgency. Lewis catheter removed.  - f/u TOV s/p stent in LAD at [facility] in January 2022. Takes daily nitroglycerin patch which she wears during the day. Also takes ranolazine ER 500mg BID, ASA 81mg daily, plavix 75mg daily, lipitor 40mg daily. Over 6 months since stent but may have other reasons for DAPT in setting of PAD.   - will c/w above medications.

## 2022-11-22 NOTE — PROGRESS NOTE ADULT - PROBLEM SELECTOR PLAN 2
Urinary symptoms w UA positive for pyuria, small LE, negative nitrites. WBC 10.20 -> 12.63 11/21 AM. No fever, tachypnea. S/p CTX 1g in ED.   - c/w CTX 1g daily, in NS  - f/u urine cx. Urinary symptoms w UA positive for pyuria, small LE, negative nitrites. WBC 10.20 -> 12.63 11/21 AM. No fever, tachypnea. S/p CTX 1g in ED. Culture: insignificant amount of mixed growth.  - c/w CTX 1g daily, in NS Urinary symptoms w UA positive for pyuria, small LE, negative nitrites. WBC 10.20 -> 12.63 11/21 AM -> 12.12 11/22 AM. No fever, tachypnea. S/p CTX 1g in ED. Culture: insignificant amount of mixed growth.  - c/w CTX 1g daily, in NS

## 2022-11-22 NOTE — DISCHARGE NOTE PROVIDER - PROVIDER TOKENS
FREE:[LAST:[Mukund],FIRST:[Cheryl],PHONE:[phone]],FAX:[phone]   [phone]    -],ADDRESS:[[address] [place] [address]],SCHEDULEDAPPT:[12/07/2022],SCHEDULEDAPPTTIME:[11:30 AM],ESTABLISHEDPATIENT:[T]]

## 2022-11-22 NOTE — PROGRESS NOTE ADULT - PROBLEM SELECTOR PLAN 3
Patient more fatigued w generalized weakness likely in setting of hyperglycemia and dehydration vs infexn (UTI), could be multifactorial. Also has hx of hypothyroidism. No recent falls/trauma. No other major metabolic abnormalities besides hyperglycemia. No drug use or withdrawal, no hypoxia, EKG changes to suggest acute vascular issue, no focal deficits to suggest CVA. TSH normal, T3 51.  - am cortisol  - treat hyperglycemia and infnxn (see plans for both)  - PT/OT. Patient more fatigued w generalized weakness likely in setting of hyperglycemia and dehydration vs infexn (UTI), could be multifactorial. Also has hx of hypothyroidism. No recent falls/trauma. No other major metabolic abnormalities besides hyperglycemia. No drug use or withdrawal, no hypoxia, EKG changes to suggest acute vascular issue, no focal deficits to suggest CVA. TSH normal, T3 51, AM cortisol normal.  - treat hyperglycemia and infnxn (see plans for both)  - PT/OT. Patient mildly hyponatremic with Na 131 11/22 AM, 132-135 throughout admission. Likely secondary to increased thirst and fluid intake with poor solid food PO intake.  - f/u ulytes.

## 2022-11-22 NOTE — DISCHARGE NOTE PROVIDER - DETAILS OF MALNUTRITION DIAGNOSIS/DIAGNOSES
This patient has been assessed with a concern for Malnutrition and was treated during this hospitalization for the following Nutrition diagnosis/diagnoses:     -  11/21/2022: Severe protein-calorie malnutrition   -  11/21/2022: Underweight (BMI < 19)

## 2022-11-22 NOTE — PROGRESS NOTE ADULT - PROBLEM SELECTOR PLAN 5
s/p stent in LAD at [facility] in January 2022. Takes daily nitroglycerin patch which she wears during the day. Also takes ranolazine ER 500mg BID, ASA 81mg daily, plavix 75mg daily, lipitor 40mg daily. Over 6 months since stent but may have other reasons for DAPT in setting of PAD.   - will c/w above medications. HTN in ED to 194/73, improved after restarting on home meds.  - c/w home medications.

## 2022-11-22 NOTE — PROGRESS NOTE ADULT - ATTENDING COMMENTS
87F with PMH of urinary retention, DM, PAD, CAD and recent flank hematoma presenting with lethargy, hyperglycemia, and likely failed outpatient treatment of UTI.     Physical exam:  General: no acute distress, very tired (per daughter) as she has not slept much over the past few days  HEENT: normocephalic, atraumatic, mildly dry mucous membranes, nonicteric sclera  Cards: RRR, normal S1/S2, no murmurs, rubs or gallops  Pulm: CTAB, no wheeze, crackles, rales or rhonchi  Ab: soft, nontender, nondistended, normoactive bowel sounds  : cassidy catheter in place, draining clear yellow urine  Neuro: grossly nonfocal exam, opens eyes to name and follows commands  Ext: wwp, no edema, erythema or tenderness  Skin: warm and dry, no obvious rashes or lesions present    - attempt trial of void once moving bowels more consistently.  Per daughter, who is at bedside, had not had a good bowel movement in a few days (poor appetite at home as well).  Normally does not have a cassidy catheter, but does have history of urinary retention and has followed with a urologist outpatient  - awaiting urine culture data - UA is grossly positive.  Increasing WBC count despite ceftriaxone.  Need final culture data in order to determine regimen.   - bowel regimen  - endocrinology consult for optimizing glycemic control.  Was slightly hypoglycemic this morning, and appetite not excellent, therefore would consider reducing basal dose until appetite/calorie intake improves  - appreciate input from nutrition
87F with PMH of urinary retention, DM, PAD, CAD and recent flank hematoma presenting with lethargy, hyperglycemia, and likely failed outpatient treatment of UTI.     Physical exam:  General: no acute distress, very tired (per daughter) as she has not slept much over the past few days  HEENT: normocephalic, atraumatic, mildly dry mucous membranes, nonicteric sclera  Cards: RRR, normal S1/S2, no murmurs, rubs or gallops  Pulm: CTAB, no wheeze, crackles, rales or rhonchi  Ab: soft, nontender, nondistended, normoactive bowel sounds  : cassidy catheter in place, draining clear yellow urine  Neuro: grossly nonfocal exam, opens eyes to name and follows commands  Ext: wwp, no edema, erythema or tenderness  Skin: warm and dry, no obvious rashes or lesions present    - attempt trial of void once moving bowels more consistently.  Per daughter, who is at bedside, had not had a good bowel movement in a few days (poor appetite at home as well).   - passed TOV  - urine culture with mixed didier and insignificant growth - will get one last dose of ceftriaxone today, and then that will complete 3 days of treatment.  WBC count down slightly from yesterday.  Afebrile with stable vital signs.  Has more energy this morning per daughter.  Patient does not have much of an appetite, but is drinking some fluids.    - bowel regimen - still no BM, but will continue to monitor.  Once patient has eaten a little more, perhaps there will be more stool bulk and then result in a BM.   - endocrinology consult for optimizing glycemic control.  Was slightly hypoglycemic this morning, and appetite not excellent, therefore would consider reducing basal dose until appetite/calorie intake improves  - appreciate input from nutrition

## 2022-11-22 NOTE — PROGRESS NOTE ADULT - PROBLEM SELECTOR PLAN 1
Plan: F/s at home in 500s, 521 in ED, with U/A showing urinary ketones and urinary glucose, without acidosis or tachypnea. At home on 12U lantus at night and metformin ER 500mg qd. Last A1c 10.0 10/20/22. TSH normal, T3 51.  - fingerstick glucose goal 100-180 mg/dL  - per endocrine: 6U latus at bedtime, 2U lispro pre-meal, SSI 4x daily with meals and at bedtime  - consistent carb diet  - am cortisol  - f/u outpt with endocrinology Plan: F/s at home in 500s, 521 in ED, with U/A showing urinary ketones and urinary glucose, without acidosis or tachypnea. At home on 12U lantus at night and metformin ER 500mg qd. Last A1c 10.0 10/20/22. TSH normal, T3 51, AM cortisol normal.  - fingerstick glucose goal 100-180 mg/dL  - per endocrine: 6U latus at bedtime, 2U lispro pre-meal, SSI 4x daily with meals and at bedtime  - consistent carb diet  - f/u outpatient with endocrinology

## 2022-11-22 NOTE — PROGRESS NOTE ADULT - PROBLEM SELECTOR PLAN 10
Mg>2, K>4  Prophylaxis: lovenox 30mg daily  Activity: Ambulate w assistance  GI: none  C: FC  Dispo: Admit to [facility].

## 2022-11-22 NOTE — DISCHARGE NOTE PROVIDER - HOSPITAL COURSE
#Discharge: do not delete    Patient is __ yo M/F with past medical history of _____ presented with _____, found to have _____ (one liner)    Hospital course (by problem):     Patient was discharged to:  Home with home PT    New medications:   Changes to old medications:  Medications that were stopped:    Items to follow up as outpatient:    Physical exam at the time of discharge:       #Discharge: do not delete    88y/o F PMH PAD, DM, CAD w MARTINA to LAD Jan 2022, PAD, recent R flank hematoma after diagnostic RLE angiogram presents w urinary frequency of 1 week and admitted for hyperglycemia.    Hospital course (by problem):  Problem/Plan - 1:  ·  Problem: Hyperglycemia.   ·  Plan: Plan: F/s at home in 500s, 521 in ED, with U/A showing urinary ketones and urinary glucose, without acidosis or tachypnea. At home on 12U lantus at night and metformin ER 500mg qd. Last A1c 10.0 10/20/22. TSH normal, T3 51, AM cortisol normal.  - fingerstick glucose goal 100-180 mg/dL  - per endocrine: 6U latus at bedtime, 2U lispro pre-meal, SSI 4x daily with meals and at bedtime  - consistent carb diet  - f/u outpatient with endocrinology.     Problem/Plan - 2:  ·  Problem: Abnormal urinalysis.   ·  Plan: Urinary symptoms w UA positive for pyuria, small LE, negative nitrites. WBC 10.20 -> 12.63 11/21 AM -> 12.12 11/22 AM. No fever, tachypnea. S/p ceftriaxone 1g in ED. Culture: insignificant amount of mixed growth.  - c/w ceftriaxone 1g daily, in NS for a total course of 3 days.     Problem/Plan - 3:  ·  Problem: Hyponatremia.   ·  Plan: Patient mildly hyponatremic with Na 131 11/22 AM, 132-135 throughout admission. Likely secondary to increased thirst and fluid intake with poor solid food PO intake.  - f/u urine lytes.     Problem/Plan - 4:  ·  Problem: Weakness.   ·  Plan: Patient more fatigued w generalized weakness likely in setting of hyperglycemia and dehydration vs infexn (UTI), could be multifactorial. Also has hx of hypothyroidism. No recent falls/trauma. No other major metabolic abnormalities besides hyperglycemia. No drug use or withdrawal, no hypoxia, EKG changes to suggest acute vascular issue, no focal deficits to suggest CVA. TSH normal, T3 51, AM cortisol normal.  - treat hyperglycemia and infection (see plans for both)  - PT/OT.     Problem/Plan - 5:  ·  Problem: HTN (hypertension).   ·  Plan: HTN in ED to 194/73, improved after restarting on home meds.  - c/w home medications.     Problem/Plan - 6:  ·  Problem: CAD (coronary artery disease).   ·  Plan: s/p stent in LAD at [facility] in January 2022. Takes daily nitroglycerin patch which she wears during the day. Also takes ranolazine ER 500mg BID, ASA 81mg daily, plavix 75mg daily, lipitor 40mg daily. Over 6 months since stent but may have other reasons for DAPT in setting of PAD.   - will c/w above medications.     Problem/Plan - 7:  ·  Problem: History of urinary retention.   ·  Plan: Urinary retention on last admission for which she followed up with urology outpatient and they suspected it is due to her DM. She has increased urgency. Lewis catheter removed.  - f/u TOV.     Problem/Plan - 8:  ·  Problem: Hypothyroidism.   ·  Plan: Takes synthroid 75mcg daily. TSH normal, T3 51.  - c/w synthroid 75 mcg daily.     Problem/Plan - 9:  ·  Problem: PAD (peripheral artery disease).   ·  Plan: Takes cilosatazol 50mg BID. No leg pain currently. Lower extremities wwp.   - c/w home med.    Patient was discharged to:  Home with home PT    New medications:   Changes to old medications:  Medications that were stopped:    Items to follow up as outpatient:    Physical exam at the time of discharge:  Constitutional: resting comfortably in bed; NAD  HEENT: NC/AT, PER, anicteric sclera, no nasal discharge; MMM  Neck: supple; no JVD or thyromegaly  Respiratory: CTA B/L; no W/R/R  Cardiac: +S1/S2; RRR; no M/R/G  Gastrointestinal: soft, NT/ND; no suprapubic tenderness; no rebound or guarding  Back: spine midline, no CVAT B/L  Extremities: WWP, no clubbing or cyanosis; no peripheral edema  Vascular: 2+ radial pulses B/L  Dermatologic: skin warm, dry and intact; no rashes, wounds, or scars  Neurologic: AAOx3; no focal deficits #Discharge: do not delete    86y/o F PMH PAD, DM, CAD w MARTINA to LAD Jan 2022, PAD, recent R flank hematoma after diagnostic RLE angiogram presents w urinary frequency of 1 week and admitted for hyperglycemia.    Hospital course (by problem):  Problem/Plan - 1:  ·  Problem: Hyperglycemia.   ·  Plan: Plan: F/s at home in 500s, 521 in ED, with U/A showing urinary ketones and urinary glucose, without acidosis or tachypnea. At home on 12U lantus at night and metformin ER 500mg qd. Last A1c 10.0 10/20/22. TSH normal, T3 51, AM cortisol normal.  - fingerstick glucose goal 100-180 mg/dL  - per endocrine: 6U latus at bedtime, 2U lispro pre-meal, SSI 4x daily with meals and at bedtime  - consistent carb diet  - f/u outpatient with endocrinology.     Problem/Plan - 2:  ·  Problem: Abnormal urinalysis.   ·  Plan: Urinary symptoms w UA positive for pyuria, small LE, negative nitrites. WBC 10.20 -> 12.63 11/21 AM -> 12.12 11/22 AM. No fever, tachypnea. S/p ceftriaxone 1g in ED. Culture: insignificant amount of mixed growth.  - c/w ceftriaxone 1g daily, in NS for a total course of 3 days.     Problem/Plan - 3:  ·  Problem: Hyponatremia.   ·  Plan: Patient mildly hyponatremic with Na 131 11/22 AM, 132-135 throughout admission. Likely secondary to increased thirst and fluid intake with poor solid food PO intake.  - f/u urine lytes.     Problem/Plan - 4:  ·  Problem: Weakness.   ·  Plan: Patient more fatigued w generalized weakness likely in setting of hyperglycemia and dehydration vs infexn (UTI), could be multifactorial. Also has hx of hypothyroidism. No recent falls/trauma. No other major metabolic abnormalities besides hyperglycemia. No drug use or withdrawal, no hypoxia, EKG changes to suggest acute vascular issue, no focal deficits to suggest CVA. TSH normal, T3 51, AM cortisol normal.  - treat hyperglycemia and infection (see plans for both)  - PT/OT.     Problem/Plan - 5:  ·  Problem: HTN (hypertension).   ·  Plan: HTN in ED to 194/73, improved after restarting on home meds.  - c/w home medications.     Problem/Plan - 6:  ·  Problem: CAD (coronary artery disease).   ·  Plan: s/p stent in LAD at [facility] in January 2022. Takes daily nitroglycerin patch which she wears during the day. Also takes ranolazine ER 500mg BID, ASA 81mg daily, plavix 75mg daily, lipitor 40mg daily. Over 6 months since stent but may have other reasons for DAPT in setting of PAD.   - will c/w above medications.     Problem/Plan - 7:  ·  Problem: History of urinary retention.   ·  Plan: Urinary retention on last admission for which she followed up with urology outpatient and they suspected it is due to her DM. She has increased urgency. Lewis catheter removed.  - f/u TOV.     Problem/Plan - 8:  ·  Problem: Hypothyroidism.   ·  Plan: Takes synthroid 75mcg daily. TSH normal, T3 51.  - c/w synthroid 75 mcg daily.     Problem/Plan - 9:  ·  Problem: PAD (peripheral artery disease).   ·  Plan: Takes cilosatazol 50mg BID. No leg pain currently. Lower extremities wwp.   - c/w home med.    Patient was discharged to:  Home with home PT    New medications:   Changes to old medications:  Medications that were stopped:    Physical exam at the time of discharge:  Constitutional: resting comfortably in bed; NAD  HEENT: NC/AT, PER, anicteric sclera, no nasal discharge; MMM  Neck: supple; no JVD or thyromegaly  Respiratory: CTA B/L; no W/R/R  Cardiac: +S1/S2; RRR; no M/R/G  Gastrointestinal: soft, NT/ND; no suprapubic tenderness; no rebound or guarding  Back: spine midline, no CVAT B/L  Extremities: WWP, no clubbing or cyanosis; no peripheral edema  Vascular: 2+ radial pulses B/L  Dermatologic: skin warm, dry and intact; no rashes, wounds, or scars  Neurologic: AAOx3; no focal deficits #Discharge: do not delete    88y/o F PMH PAD, DM, CAD w MARTINA to LAD Jan 2022, PAD, recent R flank hematoma after diagnostic RLE angiogram presents w urinary frequency of 1 week and admitted for hyperglycemia.    Hospital course (by problem):  Problem/Plan - 1:  ·  Problem: Hyperglycemia.   ·  Plan: Plan: F/s at home in 500s, 521 in ED, with U/A showing urinary ketones and urinary glucose, without acidosis or tachypnea. At home on 12U lantus at night and metformin ER 500mg qd. Last A1c 10.0 10/20/22. TSH normal, T3 51, AM cortisol normal.  - fingerstick glucose goal 100-180 mg/dL  - per endocrine: 6U latus at bedtime, 4U lispro pre-meal, SSI 4x daily with meals and at bedtime  - consistent carb diet  - f/u outpatient with endocrinology.     Problem/Plan - 2:  ·  Problem: Abnormal urinalysis.   ·  Plan: Urinary symptoms w UA positive for pyuria, small LE, negative nitrites. WBC 10.20 -> 12.63 11/21 AM -> 12.12 11/22 AM. No fever, tachypnea. S/p ceftriaxone 1g in ED. Culture: insignificant amount of mixed growth.  - c/w ceftriaxone 1g daily, in NS for a total course of 3 days.     Problem/Plan - 3:  ·  Problem: Hyponatremia.   ·  Plan: Patient mildly hyponatremic with Na 131 11/22 AM - corrected 133, 132-135 throughout admission. Likely secondary to increased thirst and fluid intake with poor solid food PO intake.  - f/u urine lytes.     Problem/Plan - 4:  ·  Problem: Weakness.   ·  Plan: Patient more fatigued w generalized weakness likely in setting of hyperglycemia and dehydration vs infexn (UTI), could be multifactorial. Also has hx of hypothyroidism. No recent falls/trauma. No other major metabolic abnormalities besides hyperglycemia. No drug use or withdrawal, no hypoxia, EKG changes to suggest acute vascular issue, no focal deficits to suggest CVA. TSH normal, T3 51, AM cortisol normal.  - treat hyperglycemia and infection (see plans for both)  - PT/OT.     Problem/Plan - 5:  ·  Problem: HTN (hypertension).   ·  Plan: HTN in ED to 194/73, improved after restarting on home meds.  - c/w home medications.     Problem/Plan - 6:  ·  Problem: CAD (coronary artery disease).   ·  Plan: s/p stent in LAD at [facility] in January 2022. Takes daily nitroglycerin patch which she wears during the day. Also takes ranolazine ER 500mg BID, ASA 81mg daily, plavix 75mg daily, lipitor 40mg daily. Over 6 months since stent but may have other reasons for DAPT in setting of PAD.   - will c/w above medications.     Problem/Plan - 7:  ·  Problem: History of urinary retention.   ·  Plan: Urinary retention on last admission for which she followed up with urology outpatient and they suspected it is due to her DM. She has increased urgency. Lewis catheter removed.  - f/u TOV.     Problem/Plan - 8:  ·  Problem: Hypothyroidism.   ·  Plan: Takes synthroid 75mcg daily. TSH normal, T3 51.  - c/w synthroid 75 mcg daily.     Problem/Plan - 9:  ·  Problem: PAD (peripheral artery disease).   ·  Plan: Takes cilosatazol 50mg BID. No leg pain currently. Lower extremities wwp.   - c/w home med.    Patient was discharged to:  Home with home PT    New medications: Insulin lispro 4U, Lantus 6U   Changes to old medications: None  Medications that were stopped: None    Physical exam at the time of discharge:  Constitutional: resting comfortably in bed; NAD  HEENT: NC/AT, PER, anicteric sclera, no nasal discharge; MMM  Neck: supple; no JVD or thyromegaly  Respiratory: CTA B/L; no W/R/R  Cardiac: +S1/S2; RRR; no M/R/G  Gastrointestinal: soft, NT/ND; no suprapubic tenderness; no rebound or guarding  Back: spine midline, no CVAT B/L  Extremities: WWP, no clubbing or cyanosis; no peripheral edema  Vascular: 2+ radial pulses B/L  Dermatologic: skin warm, dry and intact; no rashes, wounds, or scars  Neurologic: AAOx3; no focal deficits #Discharge: do not delete    86y/o F PMH PAD, DM, CAD w MARTINA to LAD Jan 2022, PAD, recent R flank hematoma after diagnostic RLE angiogram presents w urinary frequency of 1 week and admitted for hyperglycemia.    Hospital course (by problem):  Problem/Plan - 1:  ·  Problem: Hyperglycemia.   ·  Plan: Plan: F/s at home in 500s, 521 in ED, with U/A showing urinary ketones and urinary glucose, without acidosis or tachypnea. At home on 12U lantus at night and metformin ER 500mg qd. Last A1c 10.0 10/20/22. TSH normal, T3 51, AM cortisol normal.  - fingerstick glucose goal 100-180 mg/dL  - per endocrine: 6U latus at bedtime, 4U lispro pre-meal, SSI 4x daily with meals and at bedtime  - consistent carb diet  - f/u outpatient with endocrinology.     Problem/Plan - 2:  ·  Problem: Abnormal urinalysis.   ·  Plan: Urinary symptoms w UA positive for pyuria, small LE, negative nitrites. WBC 10.20 -> 12.63 11/21 AM -> 12.12 11/22 AM. No fever, tachypnea. S/p ceftriaxone 1g in ED. Culture: insignificant amount of mixed growth.  - c/w ceftriaxone 1g daily, in NS for a total course of 3 days.     Problem/Plan - 3:  ·  Problem: Hyponatremia.   ·  Plan: Patient mildly hyponatremic with Na 131 11/22 AM - corrected 133, 132-135 throughout admission. Likely secondary to increased thirst and fluid intake with poor solid food PO intake.  - f/u urine lytes.     Problem/Plan - 4:  ·  Problem: Weakness.   ·  Plan: Patient more fatigued w generalized weakness likely in setting of hyperglycemia and dehydration vs infexn (UTI), could be multifactorial. Also has hx of hypothyroidism. No recent falls/trauma. No other major metabolic abnormalities besides hyperglycemia. No drug use or withdrawal, no hypoxia, EKG changes to suggest acute vascular issue, no focal deficits to suggest CVA. TSH normal, T3 51, AM cortisol normal.  - treat hyperglycemia and infection (see plans for both)  - PT/OT.     Problem/Plan - 5:  ·  Problem: HTN (hypertension).   ·  Plan: HTN in ED to 194/73, improved after restarting on home meds.  - c/w home medications.     Problem/Plan - 6:  ·  Problem: CAD (coronary artery disease).   ·  Plan: s/p stent in LAD at [facility] in January 2022. Takes daily nitroglycerin patch which she wears during the day. Also takes ranolazine ER 500mg BID, ASA 81mg daily, plavix 75mg daily, lipitor 40mg daily. Over 6 months since stent but may have other reasons for DAPT in setting of PAD.   - will c/w above medications.     Problem/Plan - 7:  ·  Problem: History of urinary retention.   ·  Plan: Urinary retention on last admission for which she followed up with urology outpatient and they suspected it is due to her DM. She has increased urgency. Lewis catheter removed.  - f/u TOV.     Problem/Plan - 8:  ·  Problem: Hypothyroidism.   ·  Plan: Takes synthroid 75mcg daily. TSH normal, T3 51.  - c/w synthroid 75 mcg daily.     Problem/Plan - 9:  ·  Problem: PAD (peripheral artery disease).   ·  Plan: Takes cilosatazol 50mg BID. No leg pain currently. Lower extremities wwp.   - c/w home med.    Patient was discharged to:  Home with home PT    New medications: Insulin lispro 5U, Lantus 6U   Changes to old medications: None  Medications that were stopped: Metformin    Physical exam at the time of discharge:  Constitutional: resting comfortably in bed; NAD  HEENT: NC/AT, PER, anicteric sclera, no nasal discharge; MMM  Neck: supple; no JVD or thyromegaly  Respiratory: CTA B/L; no W/R/R  Cardiac: +S1/S2; RRR; no M/R/G  Gastrointestinal: soft, NT/ND; no suprapubic tenderness; no rebound or guarding  Back: spine midline, no CVAT B/L  Extremities: WWP, no clubbing or cyanosis; no peripheral edema  Vascular: 2+ radial pulses B/L  Dermatologic: skin warm, dry and intact; no rashes, wounds, or scars  Neurologic: AAOx3; no focal deficits

## 2022-11-22 NOTE — DISCHARGE NOTE PROVIDER - NSDCFUSCHEDAPPT_GEN_ALL_CORE_FT
Pily Alejandro  [facility] Physician [facility]  UROLOGY [address]  Scheduled Appointment: 12/20/2022

## 2022-11-23 ENCOUNTER — TRANSCRIPTION ENCOUNTER (OUTPATIENT)
Age: 87
End: 2022-11-23

## 2022-11-23 VITALS
HEART RATE: 96 BPM | TEMPERATURE: 98 F | DIASTOLIC BLOOD PRESSURE: 81 MMHG | OXYGEN SATURATION: 93 % | RESPIRATION RATE: 16 BRPM | SYSTOLIC BLOOD PRESSURE: 155 MMHG

## 2022-11-23 LAB
ALBUMIN SERPL ELPH-MCNC: 2.8 G/DL — LOW (ref 3.3–5)
ALP SERPL-CCNC: 98 U/L — SIGNIFICANT CHANGE UP (ref 40–120)
ALT FLD-CCNC: 10 U/L — SIGNIFICANT CHANGE UP (ref 10–45)
ANION GAP SERPL CALC-SCNC: 9 MMOL/L — SIGNIFICANT CHANGE UP (ref 5–17)
AST SERPL-CCNC: 15 U/L — SIGNIFICANT CHANGE UP (ref 10–40)
BASOPHILS # BLD AUTO: 0.07 K/UL — SIGNIFICANT CHANGE UP (ref 0–0.2)
BASOPHILS NFR BLD AUTO: 0.7 % — SIGNIFICANT CHANGE UP (ref 0–2)
BILIRUB SERPL-MCNC: 0.2 MG/DL — SIGNIFICANT CHANGE UP (ref 0.2–1.2)
BUN SERPL-MCNC: 18 MG/DL — SIGNIFICANT CHANGE UP (ref 7–23)
C PEPTIDE SERPL-MCNC: 0.1 NG/ML — LOW (ref 1.1–4.4)
CALCIUM SERPL-MCNC: 8.5 MG/DL — SIGNIFICANT CHANGE UP (ref 8.4–10.5)
CHLORIDE SERPL-SCNC: 101 MMOL/L — SIGNIFICANT CHANGE UP (ref 96–108)
CO2 SERPL-SCNC: 25 MMOL/L — SIGNIFICANT CHANGE UP (ref 22–31)
CREAT SERPL-MCNC: 0.58 MG/DL — SIGNIFICANT CHANGE UP (ref 0.5–1.3)
EGFR: 88 ML/MIN/1.73M2 — SIGNIFICANT CHANGE UP
EOSINOPHIL # BLD AUTO: 0.24 K/UL — SIGNIFICANT CHANGE UP (ref 0–0.5)
EOSINOPHIL NFR BLD AUTO: 2.3 % — SIGNIFICANT CHANGE UP (ref 0–6)
FOLATE SERPL-MCNC: 17.5 NG/ML — SIGNIFICANT CHANGE UP
GLUCOSE BLDC GLUCOMTR-MCNC: 238 MG/DL — HIGH (ref 70–99)
GLUCOSE BLDC GLUCOMTR-MCNC: 89 MG/DL — SIGNIFICANT CHANGE UP (ref 70–99)
GLUCOSE SERPL-MCNC: 81 MG/DL — SIGNIFICANT CHANGE UP (ref 70–99)
HCT VFR BLD CALC: 27.7 % — LOW (ref 34.5–45)
HGB BLD-MCNC: 9.2 G/DL — LOW (ref 11.5–15.5)
IMM GRANULOCYTES NFR BLD AUTO: 1.2 % — HIGH (ref 0–0.9)
LYMPHOCYTES # BLD AUTO: 2.79 K/UL — SIGNIFICANT CHANGE UP (ref 1–3.3)
LYMPHOCYTES # BLD AUTO: 26.8 % — SIGNIFICANT CHANGE UP (ref 13–44)
MAGNESIUM SERPL-MCNC: 1.8 MG/DL — SIGNIFICANT CHANGE UP (ref 1.6–2.6)
MCHC RBC-ENTMCNC: 30.8 PG — SIGNIFICANT CHANGE UP (ref 27–34)
MCHC RBC-ENTMCNC: 33.2 GM/DL — SIGNIFICANT CHANGE UP (ref 32–36)
MCV RBC AUTO: 92.6 FL — SIGNIFICANT CHANGE UP (ref 80–100)
MONOCYTES # BLD AUTO: 1.15 K/UL — HIGH (ref 0–0.9)
MONOCYTES NFR BLD AUTO: 11.1 % — SIGNIFICANT CHANGE UP (ref 2–14)
NEUTROPHILS # BLD AUTO: 6.03 K/UL — SIGNIFICANT CHANGE UP (ref 1.8–7.4)
NEUTROPHILS NFR BLD AUTO: 57.9 % — SIGNIFICANT CHANGE UP (ref 43–77)
NRBC # BLD: 0 /100 WBCS — SIGNIFICANT CHANGE UP (ref 0–0)
OSMOLALITY SERPL: 284 MOSM/KG — SIGNIFICANT CHANGE UP (ref 280–301)
PHOSPHATE SERPL-MCNC: 2.9 MG/DL — SIGNIFICANT CHANGE UP (ref 2.5–4.5)
PLATELET # BLD AUTO: 431 K/UL — HIGH (ref 150–400)
POTASSIUM SERPL-MCNC: 4.1 MMOL/L — SIGNIFICANT CHANGE UP (ref 3.5–5.3)
POTASSIUM SERPL-SCNC: 4.1 MMOL/L — SIGNIFICANT CHANGE UP (ref 3.5–5.3)
PROT SERPL-MCNC: 6.7 G/DL — SIGNIFICANT CHANGE UP (ref 6–8.3)
RBC # BLD: 2.99 M/UL — LOW (ref 3.8–5.2)
RBC # FLD: 13.8 % — SIGNIFICANT CHANGE UP (ref 10.3–14.5)
SODIUM SERPL-SCNC: 135 MMOL/L — SIGNIFICANT CHANGE UP (ref 135–145)
VIT B12 SERPL-MCNC: >2000 PG/ML — HIGH (ref 232–1245)
WBC # BLD: 10.4 K/UL — SIGNIFICANT CHANGE UP (ref 3.8–10.5)
WBC # FLD AUTO: 10.4 K/UL — SIGNIFICANT CHANGE UP (ref 3.8–10.5)

## 2022-11-23 PROCEDURE — 99231 SBSQ HOSP IP/OBS SF/LOW 25: CPT

## 2022-11-23 PROCEDURE — 99239 HOSP IP/OBS DSCHRG MGMT >30: CPT

## 2022-11-23 RX ORDER — INSULIN ASPART 100 [IU]/ML
5 INJECTION, SOLUTION SUBCUTANEOUS
Qty: 30 | Refills: 0
Start: 2022-11-23 | End: 2022-12-22

## 2022-11-23 RX ORDER — INSULIN GLARGINE 100 [IU]/ML
6 INJECTION, SOLUTION SUBCUTANEOUS
Qty: 30 | Refills: 0
Start: 2022-11-23 | End: 2022-12-22

## 2022-11-23 RX ORDER — ISOPROPYL ALCOHOL, BENZOCAINE .7; .06 ML/ML; ML/ML
1 SWAB TOPICAL
Qty: 100 | Refills: 1
Start: 2022-11-23 | End: 2023-01-11

## 2022-11-23 RX ORDER — SENNA PLUS 8.6 MG/1
2 TABLET ORAL
Qty: 0 | Refills: 0 | DISCHARGE
Start: 2022-11-23

## 2022-11-23 RX ORDER — INSULIN LISPRO 100/ML
4 VIAL (ML) SUBCUTANEOUS
Qty: 20 | Refills: 0
Start: 2022-11-23 | End: 2022-12-22

## 2022-11-23 RX ORDER — POLYETHYLENE GLYCOL 3350 17 G/17G
17 POWDER, FOR SOLUTION ORAL
Qty: 0 | Refills: 0 | DISCHARGE
Start: 2022-11-23

## 2022-11-23 RX ORDER — INSULIN ASPART 100 [IU]/ML
4 INJECTION, SOLUTION SUBCUTANEOUS
Qty: 30 | Refills: 0
Start: 2022-11-23 | End: 2022-12-22

## 2022-11-23 RX ADMIN — Medication 81 MILLIGRAM(S): at 11:04

## 2022-11-23 RX ADMIN — ENOXAPARIN SODIUM 30 MILLIGRAM(S): 100 INJECTION SUBCUTANEOUS at 06:13

## 2022-11-23 RX ADMIN — CILOSTAZOL 50 MILLIGRAM(S): 100 TABLET ORAL at 00:59

## 2022-11-23 RX ADMIN — Medication 1 TABLET(S): at 11:04

## 2022-11-23 RX ADMIN — RANOLAZINE 500 MILLIGRAM(S): 500 TABLET, FILM COATED, EXTENDED RELEASE ORAL at 11:04

## 2022-11-23 RX ADMIN — CILOSTAZOL 50 MILLIGRAM(S): 100 TABLET ORAL at 11:04

## 2022-11-23 RX ADMIN — Medication 75 MICROGRAM(S): at 06:13

## 2022-11-23 RX ADMIN — RANOLAZINE 500 MILLIGRAM(S): 500 TABLET, FILM COATED, EXTENDED RELEASE ORAL at 00:59

## 2022-11-23 RX ADMIN — Medication 2: at 12:03

## 2022-11-23 RX ADMIN — Medication 4 UNIT(S): at 12:04

## 2022-11-23 RX ADMIN — Medication 1 PATCH: at 10:16

## 2022-11-23 NOTE — PROGRESS NOTE ADULT - NUTRITIONAL ASSESSMENT
This patient has been assessed with a concern for Malnutrition and has been determined to have a diagnosis/diagnoses of Severe protein-calorie malnutrition and Underweight (BMI < 19).    This patient is being managed with:   Diet Consistent Carbohydrate w/Evening Snack-  Supplement Feeding Modality:  Oral  Ensure Max Cans or Servings Per Day:  1       Frequency:  Three Times a day  Entered: Nov 21 2022 12:54PM    
This patient has been assessed with a concern for Malnutrition and has been determined to have a diagnosis/diagnoses of Severe protein-calorie malnutrition and Underweight (BMI < 19).    This patient is being managed with:   Diet Consistent Carbohydrate w/Evening Snack-  1500mL Fluid Restriction ([id number])  Supplement Feeding Modality:  Oral  Ensure Max Cans or Servings Per Day:  1       Frequency:  Three Times a day  Entered: Nov 22 2022  5:30PM

## 2022-11-23 NOTE — PROGRESS NOTE ADULT - SUBJECTIVE AND OBJECTIVE BOX
Physical Medicine and Rehabilitation Progress Note :       Patient is a 87y old  Female who presents with a chief complaint of UTI (23 Nov 2022 07:21)      HPI:  HPI: 86y/o F PMH PAD, DM, CAD w MARTINA to LAD Jan 2022, PAD, recent R flank hematoma after diagnostic RLE angiogram presents w urinary frequency of 1 week. She has been urinating more than usual for 7 days which is associated w cloudy urine, fatigue, generalized weakness, suprapubic pain, L flank pain, chills, reduced PO intake of solid foods, nausea, increased thirst and fluid intake. She has been weaker and more fatigued this week than usual. She usually walks on her own but now she requires assistance to walk due to generalized weakness.  Her fingersticks at home have been in 500s. Of note she had urinary retention on last admission and now w constipation of 1 week, last BM today which was soft. She does not feel the urge to have a BM. She went to her PCP on Monday and was prescribed ciprofloxacin which she picked up on Thursday and took for 3 days but is still having urinary symptoms. She denies lightheadedness, syncope, vomiting, fever, dysuria, hematuria, CP, SOB. ROS + for chronic intermittent numbness and tingling in feet. Also had 1 mechanical fall 3-5 months ago without head trauma or evaluation, and she also felt lightheaded around the same time and had a fall without head trauma or evaluation. No recent falls.     In the ED:  Initial vital signs: T: 98 F, HR: 106-->118-->99, BP: 165/83-->194/73-->166/74, R: 18-20, SpO2: 100% on RA  ED course:   Labs: significant for f/s 521-->310, Na 128-->132, K 5.4-->4.1, bicarb 23, BHB 4.4-->0, UA w many WBC, ketones >80, and glucose >1000,   Imaging:  CXR: globular heart, mediastinum appears wide but CXR unchanged since previous admission   EKG: NSR w KIMBERLY  Medications: losartan 50mg, diltiazem 120mg, toprol 25mg, 3L NS, 6U regular insulin   Consults: none      (20 Nov 2022 22:02)                            9.2    10.40 )-----------( 431      ( 23 Nov 2022 05:30 )             27.7       11-23    135  |  101  |  18  ----------------------------<  81  4.1   |  25  |  0.58    Ca    8.5      23 Nov 2022 05:30  Phos  2.9     11-23  Mg     1.8     11-23    TPro  6.7  /  Alb  2.8<L>  /  TBili  0.2  /  DBili  x   /  AST  15  /  ALT  10  /  AlkPhos  98  11-23    Vital Signs Last 24 Hrs  T(C): 36.5 (23 Nov 2022 09:08), Max: 37 (22 Nov 2022 20:45)  T(F): 97.7 (23 Nov 2022 09:08), Max: 98.6 (22 Nov 2022 20:45)  HR: 96 (23 Nov 2022 09:08) (93 - 96)  BP: 155/81 (23 Nov 2022 09:08) (132/69 - 155/81)  BP(mean): --  RR: 16 (23 Nov 2022 09:08) (16 - 19)  SpO2: 93% (23 Nov 2022 09:08) (93% - 100%)    Parameters below as of 23 Nov 2022 09:08  Patient On (Oxygen Delivery Method): room air        MEDICATIONS  (STANDING):  aspirin enteric coated 81 milliGRAM(s) Oral every 24 hours  atorvastatin 40 milliGRAM(s) Oral at bedtime  cilostazol 50 milliGRAM(s) Oral every 12 hours  clopidogrel Tablet 75 milliGRAM(s) Oral every 24 hours  dextrose 5%. 1000 milliLiter(s) (50 mL/Hr) IV Continuous <Continuous>  dextrose 50% Injectable 25 Gram(s) IV Push once  diltiazem    milliGRAM(s) Oral every 24 hours  enoxaparin Injectable 30 milliGRAM(s) SubCutaneous every 24 hours  glucagon  Injectable 1 milliGRAM(s) IntraMuscular once  insulin glargine Injectable (LANTUS) 6 Unit(s) SubCutaneous at bedtime  insulin lispro (ADMELOG) corrective regimen sliding scale   SubCutaneous Before meals and at bedtime  insulin lispro Injectable (ADMELOG) 4 Unit(s) SubCutaneous three times a day before meals  levothyroxine 75 MICROGram(s) Oral daily  losartan 50 milliGRAM(s) Oral every 24 hours  metoprolol succinate ER 25 milliGRAM(s) Oral every 24 hours  multivitamin 1 Tablet(s) Oral daily  nitroglycerin    Patch 0.2 mG/Hr(s) 1 patch Transdermal daily  polyethylene glycol 3350 17 Gram(s) Oral every 24 hours  ranolazine 500 milliGRAM(s) Oral every 12 hours  senna 2 Tablet(s) Oral at bedtime    MEDICATIONS  (PRN):  dextrose Oral Gel 15 Gram(s) Oral once PRN Blood Glucose LESS THAN 70 milliGRAM(s)/deciliter       Physical Therapy Functional Status Assessment :   11/22/2022       Cognitive/Neuro/Behavioral  Cognitive/Neuro/Behavioral [WDL Definition: Alert; opens eyes spontaneously; arouses to voice or touch; oriented x 4; follows commands; speech spontaneous, logical; purposeful motor response; behavior appropriate to situation]: WDL    Language Assistance  Preferred Language to Address Healthcare Preferred Language to Address Healthcare: English  Preferred Sign Language Preferred Sign Language: daughter and son at bedside to translate     Therapeutic Interventions      Bed Mobility  Bed Mobility Training Rolling/Turning: independent  Bed Mobility Training Scooting: independent  Bed Mobility Training Supine-to-Sit: minimum assist (75% patient effort);  1 person assist;  verbal cues  Bed Mobility Training Limitations: decreased strength;  impaired balance    Sit-Stand Transfer Training  Transfer Training Sit-to-Stand Transfer: supervsion;  verbal cues;  1 person assist;  rollator  Transfer Training Stand-to-Sit Transfer: supervsion;  verbal cues;  1 person assist;  rollator  Sit-to-Stand Transfer Training Transfer Safety Analysis: decreased weight-shifting ability;  decreased strength;  impaired balance    Gait Training  Gait Training: supervsion;  verbal cues;  1 person assist;  rollator;  250 feet  Gait Analysis: 2-point gait   patient with fairly steady gait, no LOB;  decreased mounika;  decreased step length;  decreased strength;  impaired balance            PM&R Impression : as above    Current Disposition Plan Recommendations :    d/c home, outpatient physical therapy           
OVERNIGHT: No acute events overnight.   SUBJECTIVE: Patient was seen and examined this morning. She didn't have any new concerns or complaints. Complains of fatigue.   Appetite is labile.     CAPILLARY BLOOD GLUCOSE & INSULIN RECEIVED  Yesterday  - Dinner FS mg/dL = 2 units of premeal Lispro + 2 units of Lispro sliding scale. Ate ensure.  - Bedtime FS mg/dL = 6 units of Lantus + 1 units Lispro sliding scale.     Today  - Breakfast FS mg/dL = 2 units of premeal Lispro + 0 units of Lispro sliding scale. Ate ensures.  - Lunch FS mg/dL = 2 units of premeal Lispro + 2 units of Lispro sliding scale. Ate chicken salad, avocado, 1/2 cheesecake and tomato soup.    279 mg/dL ( @ 17:20)  214 mg/dL ( @ 12:12)  146 mg/dL ( @ 08:29)  177 mg/dL ( @ 21:49)    REVIEW OF SYSTEMS  Constitutional:  Negative fever, chills or loss of appetite.  Eyes:  Negative blurry vision or double vision.  Cardiovascular:  Negative for chest pain or palpitations.  Respiratory:  Negative for cough, wheezing, or shortness of breath.    Gastrointestinal:  Negative for nausea, vomiting, diarrhea, constipation, or abdominal pain.  Genitourinary:  Negative frequency, urgency or dysuria.  Neurologic:  No headache, confusion, dizziness, lightheadedness.    PHYSICAL EXAM  Vital Signs Last 24 Hrs  T(C): 36.6 (2022 16:00), Max: 37.2 (2022 05:00)  T(F): 97.9 (2022 16:00), Max: 98.9 (2022 05:00)  HR: 93 (2022 16:00) (87 - 93)  BP: 132/69 (2022 16:00) (127/70 - 150/78)  BP(mean): --  RR: 17 (2022 16:00) (16 - 18)  SpO2: 100% (2022 16:00) (95% - 100%)    Parameters below as of 2022 16:00  Patient On (Oxygen Delivery Method): room air    Constitutional: Awake, alert, in no acute distress.   HEENT: Normocephalic, atraumatic, GRISELDA, no proptosis or lid retraction.   Neck: supple, no acanthosis, no thyromegaly or palpable thyroid nodules.  Respiratory: Lungs clear to ausculation bilaterally.   Cardiovascular: regular rhythm, normal S1 and S2, no audible murmurs.   GI: soft, non-tender, non-distended, bowel sounds present, no masses appreciated.  Extremities: No lower extremity edema, peripheral pulses present.   Skin: no rashes.   Psychiatric: AAO x 3. Normal affect/mood.     LABS  CBC - WBC/HGB/HTC/PLT: 10.26/9.4/28.6/432 (22)  BMP - Na/K/Cl/Bicarb/BUN/Cr/Gluc: 130/4.4/99/23/26/0.63/216 (22)  Anion Gap: 8 (22)  eGFR: 86 (22)  Calcium: 8.5 (22)  Phosphorus: -- (22)  Magnesium: -- (22)  LFT - Alb/Tprot/Tbili/Dbili/AlkPhos/ALT/AST: 2.6/--/<0.2/--/96/10/14 (22)  PT/aPTT/INR: 11.6/28.1/0.98 (22)   Thyroid Stimulating Hormone, Serum: 3.230 (22)  Total T4/Free T4: 6.96/-- (22)  Urinalysis Basic - ( 2022 16:35 )    Color: Yellow / Appearance: Clear / S.020 / pH: x  Gluc: x / Ketone: NEGATIVE  / Bili: Negative / Urobili: 0.2 E.U./dL   Blood: x / Protein: 30 mg/dL / Nitrite: NEGATIVE   Leuk Esterase: Trace / RBC: < 5 /HPF / WBC Many /HPF   Sq Epi: x / Non Sq Epi: 0-5 /HPF / Bacteria: Present /HPF        MEDICATIONS  MEDICATIONS  (STANDING):  aspirin enteric coated 81 milliGRAM(s) Oral every 24 hours  atorvastatin 40 milliGRAM(s) Oral at bedtime  cilostazol 50 milliGRAM(s) Oral every 12 hours  clopidogrel Tablet 75 milliGRAM(s) Oral every 24 hours  dextrose 5%. 1000 milliLiter(s) (50 mL/Hr) IV Continuous <Continuous>  dextrose 50% Injectable 25 Gram(s) IV Push once  diltiazem    milliGRAM(s) Oral every 24 hours  enoxaparin Injectable 30 milliGRAM(s) SubCutaneous every 24 hours  glucagon  Injectable 1 milliGRAM(s) IntraMuscular once  insulin glargine Injectable (LANTUS) 6 Unit(s) SubCutaneous at bedtime  insulin lispro (ADMELOG) corrective regimen sliding scale   SubCutaneous Before meals and at bedtime  insulin lispro Injectable (ADMELOG) 4 Unit(s) SubCutaneous three times a day before meals  levothyroxine 75 MICROGram(s) Oral daily  losartan 50 milliGRAM(s) Oral every 24 hours  metoprolol succinate ER 25 milliGRAM(s) Oral every 24 hours  multivitamin 1 Tablet(s) Oral daily  nitroglycerin    Patch 0.2 mG/Hr(s) 1 patch Transdermal daily  polyethylene glycol 3350 17 Gram(s) Oral every 24 hours  ranolazine 500 milliGRAM(s) Oral every 12 hours  senna 2 Tablet(s) Oral at bedtime    MEDICATIONS  (PRN):  dextrose Oral Gel 15 Gram(s) Oral once PRN Blood Glucose LESS THAN 70 milliGRAM(s)/deciliter  
OVERNIGHT: No acute events overnight.   SUBJECTIVE: Patient was seen and examined this morning. She didn't have any new concerns or complaints. Complains of fatigue.   Appetite is labile.     CAPILLARY BLOOD GLUCOSE & INSULIN RECEIVED  Yesterday  - Dinner FS mg/dL = 4 units of premeal Lispro + 3 units of Lispro sliding scale. Ate veg, sweet pot, salad, ensure  - Bedtime FS mg/dL = 6 units of Lantus + 2 units Lispro sliding scale.     Today  - Breakfast FS mg/dL = 4 units of premeal Lispro + 0 units of Lispro sliding scale. Ate ensure and english muffin.  - Lunch FS mg/dL     REVIEW OF SYSTEMS  Constitutional:  Negative fever, chills or loss of appetite.  Eyes:  Negative blurry vision or double vision.  Cardiovascular:  Negative for chest pain or palpitations.  Respiratory:  Negative for cough, wheezing, or shortness of breath.    Gastrointestinal:  Negative for nausea, vomiting, diarrhea, constipation, or abdominal pain.  Genitourinary:  Negative frequency, urgency or dysuria.  Neurologic:  No headache, confusion, dizziness, lightheadedness.    PHYSICAL EXAM  Vital Signs Last 24 Hrs  T(C): 36.5 (2022 09:08), Max: 37 (2022 20:45)  T(F): 97.7 (2022 09:08), Max: 98.6 (2022 20:45)  HR: 96 (2022 09:08) (93 - 96)  BP: 155/81 (2022 09:08) (132/69 - 155/81)  BP(mean): --  RR: 16 (2022 09:08) (16 - 19)  SpO2: 93% (2022 09:08) (93% - 100%)    Parameters below as of 2022 09:08  Patient On (Oxygen Delivery Method): room air    Constitutional: Awake, alert, in no acute distress.   HEENT: Normocephalic, atraumatic, GRISELDA, no proptosis or lid retraction.   Neck: supple, no acanthosis, no thyromegaly or palpable thyroid nodules.  Respiratory: Lungs clear to ausculation bilaterally.   Cardiovascular: regular rhythm, normal S1 and S2, no audible murmurs.   GI: soft, non-tender, non-distended, bowel sounds present, no masses appreciated.  Extremities: No lower extremity edema, peripheral pulses present.   Skin: no rashes.   Psychiatric: AAO x 3. Normal affect/mood.     LABS  CBC - WBC/HGB/HTC/PLT: 10.40/9.2/27.7/431 (22)  BMP - Na/K/Cl/Bicarb/BUN/Cr/Gluc: 135/4.1/101/25/18/0.58/81 (22)  Anion Gap: 9 (22)  eGFR: 88 (22)  Calcium: 8.5 (22)  Phosphorus: 2.9 (22)  Magnesium: 1.8 (22)  LFT - Alb/Tprot/Tbili/Dbili/AlkPhos/ALT/AST: 2.8/--/0.2/--/98/10/15 (22)  PT/aPTT/INR: 11.6/28.1/0.98 (22)   Thyroid Stimulating Hormone, Serum: 3.230 (22)  Total T4/Free T4: 6.96/-- (22)  Urinalysis Basic - ( 2022 16:35 )    Color: Yellow / Appearance: Clear / S.020 / pH: x  Gluc: x / Ketone: NEGATIVE  / Bili: Negative / Urobili: 0.2 E.U./dL   Blood: x / Protein: 30 mg/dL / Nitrite: NEGATIVE   Leuk Esterase: Trace / RBC: < 5 /HPF / WBC Many /HPF   Sq Epi: x / Non Sq Epi: 0-5 /HPF / Bacteria: Present /HPF        MEDICATIONS  MEDICATIONS  (STANDING):  aspirin enteric coated 81 milliGRAM(s) Oral every 24 hours  atorvastatin 40 milliGRAM(s) Oral at bedtime  cilostazol 50 milliGRAM(s) Oral every 12 hours  clopidogrel Tablet 75 milliGRAM(s) Oral every 24 hours  dextrose 5%. 1000 milliLiter(s) (50 mL/Hr) IV Continuous <Continuous>  dextrose 50% Injectable 25 Gram(s) IV Push once  diltiazem    milliGRAM(s) Oral every 24 hours  enoxaparin Injectable 30 milliGRAM(s) SubCutaneous every 24 hours  glucagon  Injectable 1 milliGRAM(s) IntraMuscular once  insulin glargine Injectable (LANTUS) 6 Unit(s) SubCutaneous at bedtime  insulin lispro (ADMELOG) corrective regimen sliding scale   SubCutaneous Before meals and at bedtime  insulin lispro Injectable (ADMELOG) 4 Unit(s) SubCutaneous three times a day before meals  levothyroxine 75 MICROGram(s) Oral daily  losartan 50 milliGRAM(s) Oral every 24 hours  metoprolol succinate ER 25 milliGRAM(s) Oral every 24 hours  multivitamin 1 Tablet(s) Oral daily  nitroglycerin    Patch 0.2 mG/Hr(s) 1 patch Transdermal daily  polyethylene glycol 3350 17 Gram(s) Oral every 24 hours  ranolazine 500 milliGRAM(s) Oral every 12 hours  senna 2 Tablet(s) Oral at bedtime    MEDICATIONS  (PRN):  dextrose Oral Gel 15 Gram(s) Oral once PRN Blood Glucose LESS THAN 70 milliGRAM(s)/deciliter  
INTERVAL HPI/OVERNIGHT EVENTS:  Patient was seen and examined at bedside, resting comfortably. Primarily Guamanian speaking, daughter at bedside to translate. Reporting fatigue and abdominal pain this AM, unchanged from arrival. Denies hematuria, flank pain, N/V, fever, chills, CP, palpitations, SOB, cough. ROS otherwise negative.    VITAL SIGNS:  T(F): 98.6 (22 @ 05:00)  HR: 88 (22 @ 05:00)  BP: 132/81 (22 @ 05:00)  RR: 18 (22 @ 05:00)  SpO2: 96% (22 @ 05:00)  Wt(kg): --    22 @ 07:01  -  22 @ 07:00  --------------------------------------------------------  IN: 0 mL / OUT: 600 mL / NET: -600 mL    PHYSICAL EXAM:  Constitutional: resting comfortably in bed; NAD  HEENT: NC/AT, PER, anicteric sclera, no nasal discharge; MMM  Neck: supple; no JVD or thyromegaly  Respiratory: CTA B/L; no W/R/R  Cardiac: +S1/S2; RRR; no M/R/G  Gastrointestinal: soft with suprapubic tenderness, NT/ND; no rebound or guarding  Back: spine midline, no CVAT B/L  Extremities: WWP, no clubbing or cyanosis; no peripheral edema  Vascular: 2+ radial pulses B/L  Dermatologic: skin warm, dry and intact; no rashes, wounds, or scars  Neurologic: AAOx3; no focal deficits    MEDICATIONS  (STANDING):  aspirin enteric coated 81 milliGRAM(s) Oral every 24 hours  atorvastatin 40 milliGRAM(s) Oral at bedtime  cefTRIAXone   IVPB 1000 milliGRAM(s) IV Intermittent every 24 hours  cilostazol 50 milliGRAM(s) Oral every 12 hours  clopidogrel Tablet 75 milliGRAM(s) Oral every 24 hours  dextrose 5%. 1000 milliLiter(s) (50 mL/Hr) IV Continuous <Continuous>  dextrose 50% Injectable 25 Gram(s) IV Push once  diltiazem    milliGRAM(s) Oral every 24 hours  enoxaparin Injectable 30 milliGRAM(s) SubCutaneous every 24 hours  glucagon  Injectable 1 milliGRAM(s) IntraMuscular once  insulin glargine Injectable (LANTUS) 12 Unit(s) SubCutaneous at bedtime  insulin lispro (ADMELOG) corrective regimen sliding scale   SubCutaneous Before meals and at bedtime  insulin lispro Injectable (ADMELOG) 2 Unit(s) SubCutaneous three times a day before meals  levothyroxine 75 MICROGram(s) Oral daily  losartan 50 milliGRAM(s) Oral every 24 hours  metoprolol succinate ER 25 milliGRAM(s) Oral every 24 hours  nitroglycerin    Patch 0.2 mG/Hr(s) 1 patch Transdermal daily  ranolazine 500 milliGRAM(s) Oral every 12 hours    MEDICATIONS  (PRN):  dextrose Oral Gel 15 Gram(s) Oral once PRN Blood Glucose LESS THAN 70 milliGRAM(s)/deciliter    Allergies:  No Known Allergies    LABS:                 11.4   10.20 )-----------( 476      ( 2022 15:44 )             33.7     11-21  133<L>  |  100  |  19  ----------------------------<  236<H>  3.6   |  22  |  0.60    Ca    8.5      2022 03:17  Phos  1.7     11-21  Mg     1.6     11-    TPro  8.2  /  Alb  3.2<L>  /  TBili  0.2  /  DBili  x   /  AST  24  /  ALT  15  /  AlkPhos  144<H>  11-20    PT/INR - ( 2022 15:44 )   PT: 11.6 sec;   INR: 0.98     PTT - ( 2022 15:44 )  PTT:28.1 sec    Urinalysis Basic - ( 2022 18:32 )  Color: Yellow / Appearance: Clear / S.020 / pH: x  Gluc: x / Ketone: >=80 mg/dL  / Bili: Negative / Urobili: 0.2 E.U./dL   Blood: x / Protein: 100 mg/dL / Nitrite: NEGATIVE   Leuk Esterase: Small / RBC: < 5 /HPF / WBC Many /HPF   Sq Epi: x / Non Sq Epi: 0-5 /HPF / Bacteria: Present /HPF        RADIOLOGY & ADDITIONAL TESTS:  Reviewed
INTERVAL HPI/OVERNIGHT EVENTS:  Patient was seen and examined at bedside, resting comfortably, more alert today. O/N cassidy out. No complaints at this time. Patient denies: urinary urgency, dysuria, abdominal pain, hematuria, chills, lightheadedness, weakness, CP, palpitations, SOB, cough, N/V. ROS otherwise negative.    VITAL SIGNS:  T(F): 98.9 (22 @ 05:00)  HR: 91 (22 @ 05:00)  BP: 127/70 (22 @ 05:00)  RR: 18 (22 @ 05:00)  SpO2: 96% (22 @ 05:00)  Wt(kg): --    22 @ 07:01  -  22 @ 07:00  --------------------------------------------------------  IN: 300 mL / OUT: 0 mL / NET: 300 mL    PHYSICAL EXAM:  Constitutional: resting comfortably in bed; NAD  HEENT: NC/AT, PER, anicteric sclera, no nasal discharge; MMM  Neck: supple; no JVD or thyromegaly  Respiratory: CTA B/L; no W/R/R  Cardiac: +S1/S2; RRR; no M/R/G  Gastrointestinal: soft, NT/ND; no suprapubic tenderness; no rebound or guarding  Back: spine midline, no CVAT B/L  Extremities: WWP, no clubbing or cyanosis; no peripheral edema  Vascular: 2+ radial pulses B/L  Dermatologic: skin warm, dry and intact; no rashes, wounds, or scars  Neurologic: AAOx3; no focal deficits    MEDICATIONS  (STANDING):  aspirin enteric coated 81 milliGRAM(s) Oral every 24 hours  atorvastatin 40 milliGRAM(s) Oral at bedtime  cefTRIAXone   IVPB 1000 milliGRAM(s) IV Intermittent every 24 hours  cilostazol 50 milliGRAM(s) Oral every 12 hours  clopidogrel Tablet 75 milliGRAM(s) Oral every 24 hours  dextrose 5%. 1000 milliLiter(s) (50 mL/Hr) IV Continuous <Continuous>  dextrose 50% Injectable 25 Gram(s) IV Push once  diltiazem    milliGRAM(s) Oral every 24 hours  enoxaparin Injectable 30 milliGRAM(s) SubCutaneous every 24 hours  glucagon  Injectable 1 milliGRAM(s) IntraMuscular once  insulin glargine Injectable (LANTUS) 6 Unit(s) SubCutaneous at bedtime  insulin lispro (ADMELOG) corrective regimen sliding scale   SubCutaneous Before meals and at bedtime  insulin lispro Injectable (ADMELOG) 2 Unit(s) SubCutaneous three times a day before meals  levothyroxine 75 MICROGram(s) Oral daily  losartan 50 milliGRAM(s) Oral every 24 hours  metoprolol succinate ER 25 milliGRAM(s) Oral every 24 hours  multivitamin 1 Tablet(s) Oral daily  nitroglycerin    Patch 0.2 mG/Hr(s) 1 patch Transdermal daily  ranolazine 500 milliGRAM(s) Oral every 12 hours    MEDICATIONS  (PRN):  dextrose Oral Gel 15 Gram(s) Oral once PRN Blood Glucose LESS THAN 70 milliGRAM(s)/deciliter    Allergies  No Known Allergies    LABS:             9.2    12.63 )-----------( 396      ( 2022 08:43 )             26.7     11-21  135  |  104  |  17  ----------------------------<  97  4.0   |  23  |  0.54    Ca    8.4      2022 08:43  Phos  1.9     11-21  Mg     2.6     11-    TPro  8.2  /  Alb  3.2<L>  /  TBili  0.2  /  DBili  x   /  AST  24  /  ALT  15  /  AlkPhos  144<H>  11-20    PT/INR - ( 2022 15:44 )   PT: 11.6 sec;   INR: 0.98     PTT - ( 2022 15:44 )  PTT:28.1 sec    Urinalysis Basic - ( 2022 18:32 )  Color: Yellow / Appearance: Clear / S.020 / pH: x  Gluc: x / Ketone: >=80 mg/dL  / Bili: Negative / Urobili: 0.2 E.U./dL   Blood: x / Protein: 100 mg/dL / Nitrite: NEGATIVE   Leuk Esterase: Small / RBC: < 5 /HPF / WBC Many /HPF   Sq Epi: x / Non Sq Epi: 0-5 /HPF / Bacteria: Present /HPF    RADIOLOGY & ADDITIONAL TESTS:  Reviewed

## 2022-11-23 NOTE — DISCHARGE NOTE NURSING/CASE MANAGEMENT/SOCIAL WORK - [id number]_GEN_ALL_CORE
For information on Fall & Injury Prevention, visit: [email or web address] OR  [email or web address] OR  https://www.cdc.gov/steadi/patient.html

## 2022-11-23 NOTE — PROGRESS NOTE ADULT - ASSESSMENT
Ms. Vicente is a 87-year-old female with a past medical history of type 2 diabetes mellitus, peripheral arterial disease, coronary artery disease (MARTINA to LAD Jan 2022), hypothyroidism and recent R flank hematoma after diagnostic RLE angiogram who presented with urinary frequency for 1 week. She was admitted for UTI and hyperglycemia. Endocrinology was consulted for recommendations regarding diabetes management.     A1C: 11.6%  BUN: 17  Creatinine: 0.54  GFR: 89  Weight: 37.6  BMI: 18.6
  per Internal Medicine    87 y o F PMH PAD, DM, CAD w MARTINA to LAD Jan 2022, PAD, recent R flank hematoma after diagnostic RLE angiogram presents w urinary frequency of 1 week and admitted for hyperglycemia.    Problem/Plan - 1:  ·  Problem: Hyperglycemia.   ·  Plan: Plan: F/s at home in 500s, 521 in ED, with U/A showing urinary ketones and urinary glucose, without acidosis or tachypnea. At home on 12U lantus at night and metformin ER 500mg qd. Last A1c 10.0 10/20/22. TSH normal, T3 51, AM cortisol normal.  - fingerstick glucose goal 100-180 mg/dL  - per endocrine: 6U latus at bedtime, 2U lispro pre-meal, SSI 4x daily with meals and at bedtime  - consistent carb diet  - f/u outpatient with endocrinology.    Problem/Plan - 2:  ·  Problem: Abnormal urinalysis.   ·  Plan: Urinary symptoms w UA positive for pyuria, small LE, negative nitrites. WBC 10.20 -> 12.63 11/21 AM -> 12.12 11/22 AM. No fever, tachypnea. S/p CTX 1g in ED. Culture: insignificant amount of mixed growth.  - c/w CTX 1g daily, in NS.    Problem/Plan - 3:  ·  Problem: Hyponatremia.   ·  Plan: Patient mildly hyponatremic with Na 131 11/22 AM, 132-135 throughout admission. Likely secondary to increased thirst and fluid intake with poor solid food PO intake.  - f/u ulytes.    Problem/Plan - 4:  ·  Problem: Weakness.   ·  Plan: Patient more fatigued w generalized weakness likely in setting of hyperglycemia and dehydration vs infexn (UTI), could be multifactorial. Also has hx of hypothyroidism. No recent falls/trauma. No other major metabolic abnormalities besides hyperglycemia. No drug use or withdrawal, no hypoxia, EKG changes to suggest acute vascular issue, no focal deficits to suggest CVA. TSH normal, T3 51, AM cortisol normal.  - treat hyperglycemia and infnxn (see plans for both)  - PT/OT.    Problem/Plan - 5:  ·  Problem: HTN (hypertension).   ·  Plan: HTN in ED to 194/73, improved after restarting on home meds.  - c/w home medications.    Problem/Plan - 6:  ·  Problem: CAD (coronary artery disease).   ·  Plan: s/p stent in LAD at [facility] in January 2022. Takes daily nitroglycerin patch which she wears during the day. Also takes ranolazine ER 500mg BID, ASA 81mg daily, plavix 75mg daily, lipitor 40mg daily. Over 6 months since stent but may have other reasons for DAPT in setting of PAD.   - will c/w above medications.    Problem/Plan - 7:  ·  Problem: History of urinary retention.   ·  Plan: Urinary retention on last admission for which she followed up with urology outpatient and they suspected it is due to her DM. She has increased urgency. Lewis catheter removed.  - f/u TOV.    Problem/Plan - 8:  ·  Problem: Hypothyroidism.   ·  Plan: Takes synthroid 75mcg daily. TSH normal, T3 51.  - c/w synthroid 75 mcg daily.    Problem/Plan - 9:  ·  Problem: PAD (peripheral artery disease).   ·  Plan: Takes cilosatazol 50mg BID. No leg pain currently. Lower extremities wwp.   - c/w home med.    Problem/Plan - 10:  ·  Problem: Prophylactic measure.   ·  Plan; Mg>2, K>4  Prophylaxis: lovenox 30mg daily  Activity: Ambulate w assistance  GI: none  C: FC  Dispo: Admit to [facility].  
Ms. Vicente is a 87-year-old female with a past medical history of type 2 diabetes mellitus, peripheral arterial disease, coronary artery disease (MARTINA to LAD Jan 2022), hypothyroidism and recent R flank hematoma after diagnostic RLE angiogram who presented with urinary frequency for 1 week. She was admitted for UTI and hyperglycemia. Endocrinology was consulted for recommendations regarding diabetes management.     A1C: 11.6%  BUN: 17  Creatinine: 0.54  GFR: 89  Weight: 37.6  BMI: 18.6
88y/o F PMH PAD, DM, CAD w MARTINA to LAD Jan 2022, PAD, recent R flank hematoma after diagnostic RLE angiogram presents w urinary frequency of 1 week and admitted for hyperglycemia.
86y/o F PMH PAD, DM, CAD w MARTINA to LAD Jan 2022, PAD, recent R flank hematoma after diagnostic RLE angiogram presents w urinary frequency of 1 week and admitted for hyperglycemia.

## 2022-11-23 NOTE — DISCHARGE NOTE NURSING/CASE MANAGEMENT/SOCIAL WORK - PATIENT PORTAL LINK FT
You can access the FollowMyHealth Patient Portal offered by [facility] by registering at the following website: [email or web address]. By joining [facility]’s FollowMyHealth portal, you will also be able to view your health information using other applications (apps) compatible with our system.

## 2022-11-23 NOTE — PROGRESS NOTE ADULT - PROBLEM SELECTOR PLAN 1
Please continue lantus 6 units at bedtime.   - Increase lispro to 5 units before each meal.  - Continue lispro low dose sliding scale four times daily with meals and at bedtime.  - Patient's fingerstick glucose goal is 100-180 mg/dL.    - Please discharge on regimen above. Advised daughter, if she is only having supplement for meal, to only give 2 units. Stop metformin.  - Patient can follow up at discharge with [facility] by calling [phone] to make an appointment.  Will ask our office to contact daughter to schedule appt.    Case discussed with Dr. Cotto. Primary team updated.

## 2022-11-23 NOTE — DISCHARGE NOTE NURSING/CASE MANAGEMENT/SOCIAL WORK - NSDCFUADDAPPT_GEN_ALL_CORE_FT
Please call [facility]  at [phone] to make a follow-up appointment with our endocrinologists.

## 2022-11-26 LAB
CULTURE RESULTS: SIGNIFICANT CHANGE UP
CULTURE RESULTS: SIGNIFICANT CHANGE UP
SPECIMEN SOURCE: SIGNIFICANT CHANGE UP
SPECIMEN SOURCE: SIGNIFICANT CHANGE UP

## 2022-11-29 DIAGNOSIS — I25.10 ATHEROSCLEROTIC HEART DISEASE OF NATIVE CORONARY ARTERY WITHOUT ANGINA PECTORIS: ICD-10-CM

## 2022-11-29 DIAGNOSIS — Z79.82 LONG TERM (CURRENT) USE OF ASPIRIN: ICD-10-CM

## 2022-11-29 DIAGNOSIS — E43 UNSPECIFIED SEVERE PROTEIN-CALORIE MALNUTRITION: ICD-10-CM

## 2022-11-29 DIAGNOSIS — Z95.5 PRESENCE OF CORONARY ANGIOPLASTY IMPLANT AND GRAFT: ICD-10-CM

## 2022-11-29 DIAGNOSIS — R82.81 PYURIA: ICD-10-CM

## 2022-11-29 DIAGNOSIS — E87.1 HYPO-OSMOLALITY AND HYPONATREMIA: ICD-10-CM

## 2022-11-29 DIAGNOSIS — E11.40 TYPE 2 DIABETES MELLITUS WITH DIABETIC NEUROPATHY, UNSPECIFIED: ICD-10-CM

## 2022-11-29 DIAGNOSIS — R00.0 TACHYCARDIA, UNSPECIFIED: ICD-10-CM

## 2022-11-29 DIAGNOSIS — E11.65 TYPE 2 DIABETES MELLITUS WITH HYPERGLYCEMIA: ICD-10-CM

## 2022-11-29 DIAGNOSIS — I10 ESSENTIAL (PRIMARY) HYPERTENSION: ICD-10-CM

## 2022-11-29 DIAGNOSIS — Z79.84 LONG TERM (CURRENT) USE OF ORAL HYPOGLYCEMIC DRUGS: ICD-10-CM

## 2022-11-29 DIAGNOSIS — E11.649 TYPE 2 DIABETES MELLITUS WITH HYPOGLYCEMIA WITHOUT COMA: ICD-10-CM

## 2022-11-29 DIAGNOSIS — E11.51 TYPE 2 DIABETES MELLITUS WITH DIABETIC PERIPHERAL ANGIOPATHY WITHOUT GANGRENE: ICD-10-CM

## 2022-11-29 DIAGNOSIS — E03.9 HYPOTHYROIDISM, UNSPECIFIED: ICD-10-CM

## 2022-12-14 ENCOUNTER — APPOINTMENT (OUTPATIENT)
Dept: UROLOGY | Facility: CLINIC | Age: 87
End: 2022-12-14

## 2022-12-14 VITALS
SYSTOLIC BLOOD PRESSURE: 206 MMHG | TEMPERATURE: 97 F | HEART RATE: 112 BPM | DIASTOLIC BLOOD PRESSURE: 95 MMHG | OXYGEN SATURATION: 94 %

## 2022-12-14 PROCEDURE — 99214 OFFICE O/P EST MOD 30 MIN: CPT

## 2022-12-14 PROCEDURE — 51798 US URINE CAPACITY MEASURE: CPT

## 2022-12-15 NOTE — HISTORY OF PRESENT ILLNESS
[FreeTextEntry1] : Pt is an 86 yo F  with hx of high post void residuals, poorly controlled Type 2 DM, referred by Dr. Lim. UA from 22 showed 27 RBC's/HPF. Pt presents today for a follow-up visit, accompanied by her daughter. Patient daughter reports that she experiences total urinary incontinence at times. \par \par Pt has had a UTI in October, November, and December of this year. When she last had a UTI, her blood sugar was difficult to control and was in the 500 range.  \par \par Pt is hard of hearing and occasionally confused. \par \par Udip: (Pt unable to void)\par PVR: 244 cc (to rule out incomplete bladder emptying)\par \par PMH: DM, SOB, thyroid issues, vascular issues\par PSH: gallbladder, vascular surgery at OSH with hematoma \par FH: \par SH: non-smoker, rare alcohol, \par \par Habits: Urinates 3-4x daily\par \par Allergies: NKDA\par \par Meds: atorvastatin, ASA, clilostazol, plavix, diltiazem, lantus, levothyroxine, metformin, metoprolol, ranolazine, telmisartan, MVI, vegetable vitamin\par \par 10/20/22-- Patient is a 87 year female who first presents 10/20/2022 for urinary retention (high PVRs)\par \par she has no complaints about her urinating. Her high PVRs were detected during a recent vascular admission. She does have some urgency. She has nocturia x1. She denies straining to void. She denies incontinence. She does not feel anything is falling out of her vagina. She is , all . She has no h/o  or pelvic surgery.\par \par She denies recent UTI's. She has occasional constipation. She has a BM daily. Rarely every two days. Her DM is poorly controlled. Her PCP is helping her manage this. \par \par Please note most history is from daughter due to mother's severe [place], language barrier, and suspected memory loss. \par \par PVR: 263 cc (to rule out incomplete bladder emptying)\par 10/20/2022: A1c 10%, sGLC 329, sCre 0.52\par

## 2022-12-15 NOTE — LETTER BODY
[Dear  ___] : Dear  [unfilled], [Consult Letter:] : I had the pleasure of evaluating your patient, [unfilled]. [Please see my note below.] : Please see my note below. [Consult Closing:] : Thank you very much for allowing me to participate in the care of this patient.  If you have any questions, please do not hesitate to contact me. [Sincerely,] : Sincerely, [FreeTextEntry3] : Dr. Pily Alejandro\par

## 2022-12-15 NOTE — ASSESSMENT
[FreeTextEntry1] : Pt is an 86 yo F  with hx of high post void residuals, poorly controlled Type 2 DM, referred by Dr. Lim. UA from 22 showed 27 RBC's/HPF. Pt presents today for a follow-up visit, accompanied by her daughter. Patient daughter reports that she experiencesl urinary incontinence at times. \par \par Pt has had a UTI in October, November, and December of this year. When she last had a UTI, her blood sugar was difficult to control- (>500). \par \par She is hard of hearing and occasionally confused. \par \par Today's physical exam showed no prolapse. \par \par She will undergo a renal US to rule out hydronephrosis. \par \par Pt will also return to undergo a urodynamics evaluation. If Pt's bladder is acontractile secondary to DM and US negative for hydronephrosis, I will consider placing her on a course of suppressive Abx. \par \par Pt did not leave urine sample, was unable to void.    She will need to leave a urine sample for culture prior to UroD. \par \par

## 2022-12-15 NOTE — ADDENDUM
[FreeTextEntry1] : A portion of this note was written by [Maria De Jesus Adamson] on 12/14/2022  acting as a scribe for Dr. Alejandro. \par \par I have personally reviewed the chart and agree that the record accurately reflects my personal performance and the history, physical exam, assessment, and plan.\par

## 2023-01-19 ENCOUNTER — APPOINTMENT (OUTPATIENT)
Dept: UROLOGY | Facility: CLINIC | Age: 88
End: 2023-01-19

## 2023-02-06 ENCOUNTER — APPOINTMENT (OUTPATIENT)
Dept: ENDOCRINOLOGY | Facility: CLINIC | Age: 88
End: 2023-02-06
Payer: MEDICARE

## 2023-02-06 VITALS
SYSTOLIC BLOOD PRESSURE: 170 MMHG | BODY MASS INDEX: 19.28 KG/M2 | DIASTOLIC BLOOD PRESSURE: 109 MMHG | WEIGHT: 86 LBS | HEART RATE: 99 BPM

## 2023-02-06 VITALS — DIASTOLIC BLOOD PRESSURE: 80 MMHG | SYSTOLIC BLOOD PRESSURE: 150 MMHG

## 2023-02-06 LAB
GLUCOSE BLDC GLUCOMTR-MCNC: 284
HBA1C MFR BLD HPLC: 11.3

## 2023-02-06 PROCEDURE — 99204 OFFICE O/P NEW MOD 45 MIN: CPT

## 2023-02-06 PROCEDURE — 82962 GLUCOSE BLOOD TEST: CPT

## 2023-02-06 PROCEDURE — 83036 HEMOGLOBIN GLYCOSYLATED A1C: CPT | Mod: QW

## 2023-02-08 LAB
ALBUMIN SERPL ELPH-MCNC: 3.6 G/DL
ALP BLD-CCNC: 114 U/L
ALT SERPL-CCNC: 16 U/L
ANION GAP SERPL CALC-SCNC: 10 MMOL/L
AST SERPL-CCNC: 16 U/L
BILIRUB SERPL-MCNC: 0.3 MG/DL
BUN SERPL-MCNC: 29 MG/DL
C PEPTIDE SERPL-MCNC: 0.9 NG/ML
CALCIUM SERPL-MCNC: 9.7 MG/DL
CHLORIDE SERPL-SCNC: 97 MMOL/L
CHOLEST SERPL-MCNC: 221 MG/DL
CO2 SERPL-SCNC: 25 MMOL/L
CREAT SERPL-MCNC: 0.58 MG/DL
CREAT SPEC-SCNC: 43 MG/DL
EGFR: 88 ML/MIN/1.73M2
ESTIMATED AVERAGE GLUCOSE: 280 MG/DL
GLUCOSE SERPL-MCNC: 331 MG/DL
HBA1C MFR BLD HPLC: 11.4 %
HDLC SERPL-MCNC: 88 MG/DL
LDLC SERPL CALC-MCNC: 103 MG/DL
MICROALBUMIN 24H UR DL<=1MG/L-MCNC: 182.3 MG/DL
MICROALBUMIN/CREAT 24H UR-RTO: 4279 MG/G
NONHDLC SERPL-MCNC: 133 MG/DL
POTASSIUM SERPL-SCNC: 4.2 MMOL/L
PROT SERPL-MCNC: 7.1 G/DL
SODIUM SERPL-SCNC: 132 MMOL/L
T4 FREE SERPL-MCNC: 1.2 NG/DL
THYROGLOB AB SERPL-ACNC: 78.4 IU/ML
THYROPEROXIDASE AB SERPL IA-ACNC: 64.4 IU/ML
TRIGL SERPL-MCNC: 147 MG/DL
TSH SERPL-ACNC: 5.06 UIU/ML
VIT B12 SERPL-MCNC: 1750 PG/ML

## 2023-02-08 NOTE — PHYSICAL EXAM
[Alert] : alert [No Acute Distress] : no acute distress [Normal Voice/Communication] : normal voice communication [Normal Hearing] : hearing was normal [No Respiratory Distress] : no respiratory distress [Normal Rate and Effort] : normal respiratory rate and effort [Clear to Auscultation] : lungs were clear to auscultation bilaterally [Normal S1, S2] : normal S1 and S2 [Normal Rate] : heart rate was normal [Regular Rhythm] : with a regular rhythm [Normal Gait] : normal gait [Oriented x3] : oriented to person, place, and time [Normal Affect] : the affect was normal [Normal Insight/Judgement] : insight and judgment were intact [de-identified] : Petite

## 2023-02-08 NOTE — ASSESSMENT
[Carbohydrate Consistent Diet] : carbohydrate consistent diet [Hypoglycemia Management] : hypoglycemia management [Action and use of Insulin] : action and use of short and long-acting insulin [Self Monitoring of Blood Glucose] : self monitoring of blood glucose [Injection Technique, Storage, Sharps Disposal] : injection technique, storage, and sharps disposal [FreeTextEntry1] : 1. T2D\par A1C goal <8%\par A1C - 11.3% (2/6/23) from 10% (10/21/22)\par Current regimen: Lantus 12 units qhs, Novolog 4 units TIDAC\par Daughter is administering insulin and varies lantus dosing pending evening BG and AM BG\par Glucometer readings at home reveal fasting hypoglycemia and intermittent postprandial hyperglycemia, likely also attributed by correcting hypos\par Glucometer reading performed in office today is 284 <2H postprandially\par \par Counseling provided regarding T2DM (physiology of T2DM, inclusive of pancreatic dysfunction, insulin resistance,), evaluation and management with lifestyle and pharmacologic approach.\par Counseling provided regarding the need for glycemic control to prevent symptomatic hypo/hyper-glycemia as well as to prevent future micro-/macro-vascular disease.\par Counseling provided regarding diabetic guidelines- diabetes care plan handout utilized as visual aid (monitoring of glycemic control, knowing numbers- BG, cholesterol, BP, annual diabetic eye and foot exam).\par -- labs today, including cpeptide to determine if able to continue use of oral agents to simplify regimen\par -- referral to see opthal and podiatry\par -- decrease lantus to 6 units, as when injecting 7 still some BG in 70-80s, to be consistent with this dose, if AM BG >150, to increase to 7 units\par -- continue novolog 4 units with meals, if high carb meal, ok to increase to 6 units and if small/low carb meal, can try no coverage, but if 2H PP >200, then will need 2 units\par -- RX for Ceci 2 CGM sent to [facility] via [place]\par \par JOSE  has diabetes mellitus, has been using a home blood glucose monitor and reports testing 4 times a day using the meter. she  is insulin-treated with three or more daily injections of insulin. I have seen the JOSE within the last 6 months, I have observed their glucose control and determined that based on the above, the patient requires a therapeutic CGM to properly manage their blood sugars. her  insulin treatment regimen requires frequent adjustments on the basis of therapeutic CGM testing results . JOSE will have a follow-up visit within the first 6 months of CGM prescription in order to evaluate adherence to CGM regimen and their diabetes treatment plan.\par \par 2. HTN\par BP today above goal, improved on repeat testing, as too small cuff initially likely used\par -- continue current regimen with consistency\par \par 3. HLD\par On atorvastatin 40mg\par -- repeat with labs today\par \par 4. Hypothyroidism\par Current thyroid regimen: Synthroid 75mcg QAM, on this dose for years\par -- repeat TFTs today\par \par Labs today, I will call/[facility] message with results\par Follow up in 2-3 months\par \par Melissa Jerome MS, FNP-BC, CDCES\par 02/06/2023

## 2023-02-08 NOTE — HISTORY OF PRESENT ILLNESS
[FreeTextEntry1] : JOSE PABLO is a 87 year female with pmhx of T2D (>30 years ago), HTN, HLD, hypothyroidism who presents for T2D evaluation.\par \par Presents with Alana, daughter, providing Romanian interpretation, as needed.  She is active in mothers care\par Diabetes previously managed by PCP, Dr Duval\par \par Endorses variability of BG\par Daughter has been modifying insulin doses \par Having lows in the morning on 12 unit dosing, daughter decreased to 7 units with fasting BG \par Interested in CGM, never used before\par Last labs when hospitalized in 11/2022 for hyperglycemia and infection\par \par A1C - 11.3% (2/6/23) from 10% (10/21/22)\par Office Fingerstick -- 284 (not fasting)\par \par Current medication:\par - Lantus 12 units at night \par - Novolog 4 units TIDAC, started in 11/2022 with stopping metformin in setting of hyperglycemia\par Telmisartan 40mg\par Atorvastatin 40mg\par \par Past medication:\par - metformin 1000mg, stopped with starting MDI and GI upset\par - Januvia\par - actos\par - glyburide\par \par JOSE reports they take their diabetes medication ALL of the time.\par JOSE denies/endorses hypoglycemia symptoms or BG <70\par \par Diabetes Self-Management:\par Glucometer: contour next\par Monitoring BG 4x daily\par --fasting BG range: , dose dependent\par -- postprandial range 200-mid 300s\par \par Diet--\par Typically consumes ~2-3 meals/daily, +/- snacks\par \par Ophthalmology: >1 year, will schedule FU, referral provided 2/6/23\par Podiatry: >1 year, will schedule FU, referral provided 2/6/23\par \par 2. Hypothyroidism\par Dx ~15 years ago\par Current regimen: Synthroid 75 mcg QAM daily\par - on this dose for years\par No h/o radiation exposure

## 2023-02-08 NOTE — ADDENDUM
[FreeTextEntry1] : 2/8/2023, addendum, SG--\par VM left requesting return call to discuss lab results\par A1C 11.4% with low cpeptide or 0.9, continue plan from visit for MDI adjustments and will recommend closer FU than 6/2023 on file that is currently scheduled\par CMP with gluc 331 and Na+ 132, will correct hyperglycemic, with MDI adjustments, hyponatremia likely 2/2 hyperglycemia\par Cholesterol with , will confirm consistency with statin\par TSH 5.06 with FT4 1.2 and +TPO/Tg ABs, will review current LT4 supplement\par + proteinuria, will review if sees nephro, if not, will refer

## 2023-02-16 ENCOUNTER — APPOINTMENT (OUTPATIENT)
Dept: UROLOGY | Facility: CLINIC | Age: 88
End: 2023-02-16

## 2023-02-21 ENCOUNTER — APPOINTMENT (OUTPATIENT)
Dept: HEART AND VASCULAR | Facility: CLINIC | Age: 88
End: 2023-02-21

## 2023-03-09 NOTE — DISCHARGE NOTE NURSING/CASE MANAGEMENT/SOCIAL WORK - NSTRANSFEREYEGLASSESPAIRS_GEN_A_NUR
PAGER ID: [phone]

MESSAGE: [id number]- Cheryl, T- pt BP currently is 182/117 hr 91 and 184/122. He had tropolol 25mg 
at 0942. Do you want me to give the hydralazine PRN as ordered or do you want a diff order?- 
Rachel [phone] 1 pair

## 2023-04-14 ENCOUNTER — APPOINTMENT (OUTPATIENT)
Dept: VASCULAR SURGERY | Facility: CLINIC | Age: 88
End: 2023-04-14
Payer: MEDICARE

## 2023-04-14 ENCOUNTER — NON-APPOINTMENT (OUTPATIENT)
Age: 88
End: 2023-04-14

## 2023-04-14 ENCOUNTER — APPOINTMENT (OUTPATIENT)
Dept: HEART AND VASCULAR | Facility: CLINIC | Age: 88
End: 2023-04-14
Payer: MEDICARE

## 2023-04-14 VITALS
SYSTOLIC BLOOD PRESSURE: 126 MMHG | WEIGHT: 91.5 LBS | HEIGHT: 56 IN | RESPIRATION RATE: 14 BRPM | BODY MASS INDEX: 20.58 KG/M2 | HEART RATE: 86 BPM | DIASTOLIC BLOOD PRESSURE: 64 MMHG | OXYGEN SATURATION: 96 % | TEMPERATURE: 98.8 F

## 2023-04-14 VITALS
WEIGHT: 91 LBS | HEIGHT: 56 IN | HEART RATE: 87 BPM | BODY MASS INDEX: 20.47 KG/M2 | SYSTOLIC BLOOD PRESSURE: 157 MMHG | DIASTOLIC BLOOD PRESSURE: 86 MMHG

## 2023-04-14 DIAGNOSIS — M79.605 PAIN IN LEFT LEG: ICD-10-CM

## 2023-04-14 PROCEDURE — 93000 ELECTROCARDIOGRAM COMPLETE: CPT

## 2023-04-14 PROCEDURE — 99203 OFFICE O/P NEW LOW 30 MIN: CPT | Mod: 25

## 2023-04-14 PROCEDURE — 99213 OFFICE O/P EST LOW 20 MIN: CPT

## 2023-04-14 PROCEDURE — 93926 LOWER EXTREMITY STUDY: CPT

## 2023-04-14 RX ORDER — SULFAMETHOXAZOLE AND TRIMETHOPRIM 800; 160 MG/1; MG/1
800-160 TABLET ORAL TWICE DAILY
Qty: 10 | Refills: 0 | Status: DISCONTINUED | COMMUNITY
Start: 2022-11-02 | End: 2023-04-14

## 2023-04-14 NOTE — HISTORY OF PRESENT ILLNESS
[FreeTextEntry1] : 87yoF with CAD s/p PCI at [facility], DM, HTN, HLD, Hypothyroid who underwent diagnostic angiogram on 10/17/22 due to pseudoaneurysm of R femoral artery noted on CT, but at the time of the angio, the defect had resolved. Today she was seen by a cardiologist, Dr. Beebe and during the visit she c/o left leg rest pain and calf edema. She presents for an evaluation. Patient's daughter, who accompanies the patient states that her mother developed pain ~a week ago, her symptoms are constant and keep her up at night. She also noticed that her foot is swollen. She denies claudication, skin changes, numbness, neurologic deficits. Patient also c/o of generalized fatigue.

## 2023-04-14 NOTE — REVIEW OF SYSTEMS
[Feeling Tired] : feeling tired [Lower Ext Edema] : lower extremity edema [As Noted in HPI] : as noted in HPI [Limb Pain] : limb pain [Limb Swelling] : limb swelling [Negative] : Heme/Lymph [Leg Claudication] : no intermittent leg claudication

## 2023-04-14 NOTE — PROCEDURE
[FreeTextEntry1] : LLE arterial duplex was done in the office that demonstrated diffuse calcifications, however no occlusive disease noted, patent three-vessel runnoff to the foot.\par Limited venous doppler was done to r/o DVT and it is unremarkable.

## 2023-04-14 NOTE — ASSESSMENT
[FreeTextEntry1] : - polypharmacy stop diltiazem ranexa cilostazol\par - to see vascular for leg pain \par - CAD on atorvastatin 40 mg daily and cilostazol\par - HTN stay on telmisartan 40 mg daily\par - palpitations on toprol xl 25 mg daily\par - Unclear why she is on minoxidil will stop \par - cp and sob will do echo and stress test\par - fu in four weeks

## 2023-04-14 NOTE — HISTORY OF PRESENT ILLNESS
[FreeTextEntry1] : 87 F [place] SPeaking CAD s/p PCI at [facility] DM HTN HLD Hypothyroid Balance disorder PAD UTI\par \par accompanied by daughter referred by Dr. Lawler for chest pain and shortness of breath she gets weak and cant stand too long symptoms have started since her last vascular intervention she is very tired and cant do her normal ADL she used to clean and lifting heavy items she has to rest a lot having a lot of right calf pain having balance issues and general decline\par \par \par ecg nsr 4/14/2023

## 2023-04-14 NOTE — PHYSICAL EXAM
[Respiratory Effort] : normal respiratory effort [Normal Heart Sounds] : normal heart sounds [2+] : left 2+ [1+] : left 1+ [No Rash or Lesion] : No rash or lesion [Alert] : alert [Calm] : calm [Ankle Swelling (On Exam)] : not present [Varicose Veins Of Lower Extremities] : not present [] : not present [Abdomen Tenderness] : ~T ~M No abdominal tenderness [de-identified] : WN/WD, NAD [de-identified] : NC/AT [de-identified] : supple [de-identified] : +FROM [de-identified] : grossly intact

## 2023-04-14 NOTE — ASSESSMENT
[FreeTextEntry1] : 87yoF with CAD s/p PCI at [facility], DM, HTN, HLD, Hypothyroid is referred  by Dr. Beebe due to new c/o left leg rest pain and calf edema that developed ~ a week ago. \par Both legs are well perfused, diminished but palpable pulses b/l, no edema, no skin changes, legs are warm, no neurologic deficits.\par LLE arterial duplex was done in the office that demonstrated diffuse calcifications, however no occlusive disease noted, patent three-vessel runnoff to the foot.\par Limited venous doppler was done to r/o DVT and it is unremarkable.\par We discussed the findings and explained that she doesn't have any significant vascular disease and her symptoms are most likely neurologic in nature.\par Pt's daughter admits to hx of lower back issues, therefore we explained it might be related to that.\par No vascular intervention is necessary at this time.

## 2023-05-08 ENCOUNTER — APPOINTMENT (OUTPATIENT)
Dept: ENDOCRINOLOGY | Facility: CLINIC | Age: 88
End: 2023-05-08
Payer: MEDICARE

## 2023-05-08 VITALS
HEART RATE: 84 BPM | DIASTOLIC BLOOD PRESSURE: 81 MMHG | WEIGHT: 98 LBS | BODY MASS INDEX: 21.97 KG/M2 | SYSTOLIC BLOOD PRESSURE: 155 MMHG

## 2023-05-08 LAB — HBA1C MFR BLD HPLC: 10

## 2023-05-08 PROCEDURE — 99214 OFFICE O/P EST MOD 30 MIN: CPT | Mod: 25

## 2023-05-08 PROCEDURE — 83036 HEMOGLOBIN GLYCOSYLATED A1C: CPT | Mod: QW

## 2023-05-08 NOTE — HISTORY OF PRESENT ILLNESS
[FreeTextEntry1] : JOSE PABLO is a 87 year female with pmhx of T2D (>30 years ago), HTN, HLD, hypothyroidism who presents for T2D evaluation.\par \par Presents with Alana, daughter, providing Hebrew interpretation, as needed.  She is active in mothers care\par Diabetes previously managed by PCP, Dr Duval\par \par Interval change:\par Since last visit decreased Lantus 10 and increased Novolog 6 units TIDAC\par Presents with Libre2 [place], unable to download, but manually reviewed for patterns, as below\par Daughter endorses variability of BG\par Injects Novolog post-meal when Ceci is alerting hyperglycemia with resultant hypoglycemia.  Also will correct evening hyperglycemia with same novolog dosing unopposed to food with hypoglycemia\par Daughter is active in her care\par \par A1C - 10% (POCT today, 5/8/23) from11.3% (2/6/23) from 10% (10/21/22)\par Office Fingerstick -- 192 (postprandial)\par \par Current medication:\par - Lantus 10 units at night \par - Novolog 6 units TIDAC\par Telmisartan 40mg\par Atorvastatin 40mg\par \par Past medication:\par - metformin 1000mg, stopped with starting MDI and GI upset\par - Januvia\par - actos\par - glyburide\par \par JOSE reports they take their diabetes medication ALL of the time.\par JOSE ENDORSES hypoglycemia symptoms or BG <70, as above\par \par Diabetes Self-Management:\par CGM Analysis performed on 05/08/2023 :\par Indication for CGM placement/wear: T2DM\par Device : Ceci 2\par Sensor placement date: N/A -- personal device\par Sensor removal date: N/A -- personal device\par Date of data download/printout: N/a-- unable to download\par \par Active CGM wear time: 74%\par AVG Glucose: 225\par MN-6AM: 242 AVG, 6AM-12N 165 ABG, 12N-6P 236 AVG and 6P-12MN 268 ANG\par \par % High: 64%\par % In Range: 31%\par % Low: 4%\par \par CGM Patterns:\par - Postprandial excursions that are often overcorrected, as well as overcorrected hyperglycemia overnight\par Clinical recommendations, per A/P section of this office visit note\par \par Diet--\par Typically consumes ~2-3 meals/daily, +/- snacks\par - small meals\par \par Ophthalmology: >1 year, will schedule FU, referral provided 2/6/23\par Podiatry: >1 year, will schedule FU, referral provided 2/6/23\par \par 2. Hypothyroidism\par Dx ~15 years ago\par Current regimen: Synthroid 75 mcg QAM daily\par - on this dose for years\par No h/o radiation exposure

## 2023-05-08 NOTE — ASSESSMENT
[Action and use of Insulin] : action and use of short and long-acting insulin [Self Monitoring of Blood Glucose] : self monitoring of blood glucose [Insulin Self-Administration] : insulin self-administration [Injection Technique, Storage, Sharps Disposal] : injection technique, storage, and sharps disposal [FreeTextEntry1] : 1. T2D\par A1C goal <8%\par A1C - 10% (POCT today, 5/8/23) from 11.3% (2/6/23) from 10% (10/21/22)\par Current regimen: Lantus 10 units qhs, Novolog 6 units TIDAC\par Daughter is administering insulin and monitoring patients BG with Libre2\par Glucometer readings at home reveal postprandial excursions with overcorrection and occasional nighttime overcorrections\par Glucometer reading performed in office today is 194 <2H postprandially\par \par Daughter continues to endorse lability of glycemic patterns.  With review of Ceci and further discussion, daughter is waiting to give novolog until Ceci is alerting elevated sugar with meals and if bedtime BG is elevated, she is providing full novolog dose to correct, with resultant hypoglycemia.  Reviewed insulin kinetics and discussed trial for adjustment of Novolog dosing timing to pre-meal, and will sliding scale\par -- continue Lantus 10 units daily, for now\par -- For Novolog, recommend dosing premeal per scale: If pre-meal sugar is <150, take 4 units, If pre-meal sugar is 150-200, take 5 units , If pre-meal sugar is 201-250, take 6 units, If pre-meal sugar is 251-300, take 7 units, If pre-meal sugar is 301-350, take 8 units, If pre-meal sugar is >350, take 9 units \par -- For correcting sugar unopposed to food with Novolog, recommend: If bedtime sugar is 250- 350, inject 2 units, \par If bedtime sugar is >350, inject 3 units \par -- Continue use of Freestyle Libre2\par \par JOSE  has diabetes mellitus, has been using a home blood glucose monitor and reports testing 4 times a day using the meter. she  is insulin-treated with three or more daily injections of insulin. I have seen the JOSE within the last 6 months, I have observed their glucose control and determined that based on the above, the patient requires a therapeutic CGM to properly manage their blood sugars. her  insulin treatment regimen requires frequent adjustments on the basis of therapeutic CGM testing results . JOSE will have a follow-up visit within the first 6 months of CGM prescription in order to evaluate adherence to CGM regimen and their diabetes treatment plan.\par \par 2.  HLD\par On atorvastatin 40mg\par 2/6/23 \par -- continue statin, as above\par \par 4. Hypothyroidism\par Current thyroid regimen: Synthroid 75mcg QAM, on this dose for years\par 2/6/23 TSH 5\par -- continue LT4 75mcg\par -- repeat with next labs\par \par Follow up in 2 months, or sooner, as needed\par \par Melissa Jerome MS, FNP-BC, [facility]\par 05/08/2023

## 2023-05-08 NOTE — PHYSICAL EXAM
[No Acute Distress] : no acute distress [Alert] : alert [Normal Voice/Communication] : normal voice communication [Normal Hearing] : hearing was normal [No Respiratory Distress] : no respiratory distress [Normal Rate and Effort] : normal respiratory rate and effort [Oriented x3] : oriented to person, place, and time [Normal Affect] : the affect was normal [Normal Insight/Judgement] : insight and judgment were intact [de-identified] : Petite [de-identified] : Ambulates today with walker

## 2023-05-11 ENCOUNTER — NON-APPOINTMENT (OUTPATIENT)
Age: 88
End: 2023-05-11

## 2023-05-11 ENCOUNTER — APPOINTMENT (OUTPATIENT)
Dept: HEART AND VASCULAR | Facility: CLINIC | Age: 88
End: 2023-05-11
Payer: MEDICARE

## 2023-05-11 VITALS
SYSTOLIC BLOOD PRESSURE: 156 MMHG | WEIGHT: 92 LBS | OXYGEN SATURATION: 95 % | BODY MASS INDEX: 20.7 KG/M2 | HEIGHT: 56 IN | TEMPERATURE: 97.5 F | HEART RATE: 89 BPM | DIASTOLIC BLOOD PRESSURE: 70 MMHG

## 2023-05-11 PROCEDURE — 99214 OFFICE O/P EST MOD 30 MIN: CPT

## 2023-05-11 PROCEDURE — 36415 COLL VENOUS BLD VENIPUNCTURE: CPT

## 2023-05-11 PROCEDURE — 93000 ELECTROCARDIOGRAM COMPLETE: CPT

## 2023-05-11 RX ORDER — CLOPIDOGREL BISULFATE 75 MG/1
75 TABLET, FILM COATED ORAL DAILY
Refills: 0 | Status: DISCONTINUED | COMMUNITY
Start: 2022-05-18 | End: 2023-05-11

## 2023-05-11 RX ORDER — SIMVASTATIN 10 MG/1
10 TABLET, FILM COATED ORAL
Qty: 90 | Refills: 0 | Status: DISCONTINUED | COMMUNITY
Start: 2023-03-01 | End: 2023-05-11

## 2023-05-11 RX ORDER — CLOBETASOL PROPIONATE 0.5 MG/ML
0.05 SOLUTION TOPICAL
Qty: 50 | Refills: 0 | Status: DISCONTINUED | COMMUNITY
Start: 2022-05-16 | End: 2023-05-11

## 2023-05-11 RX ORDER — BLOOD-GLUCOSE METER
W/DEVICE KIT MISCELLANEOUS
Qty: 1 | Refills: 0 | Status: DISCONTINUED | COMMUNITY
Start: 2022-11-23 | End: 2023-05-11

## 2023-05-11 RX ORDER — CILOSTAZOL 50 MG/1
50 TABLET ORAL
Refills: 0 | Status: DISCONTINUED | COMMUNITY
Start: 2022-05-23 | End: 2023-05-11

## 2023-05-11 RX ORDER — ISOPROPYL ALCOHOL 0.75 G/1
SWAB TOPICAL
Qty: 100 | Refills: 0 | Status: DISCONTINUED | COMMUNITY
Start: 2022-11-23 | End: 2023-05-11

## 2023-05-11 RX ORDER — ELECTROLYTES/DEXTROSE
32G X 4 MM SOLUTION, ORAL ORAL
Qty: 100 | Refills: 0 | Status: ACTIVE | COMMUNITY
Start: 2022-11-23

## 2023-05-11 RX ORDER — BLOOD SUGAR DIAGNOSTIC
STRIP MISCELLANEOUS
Qty: 50 | Refills: 0 | Status: DISCONTINUED | COMMUNITY
Start: 2022-09-29 | End: 2023-05-11

## 2023-05-11 RX ORDER — LANCETS 33 GAUGE
EACH MISCELLANEOUS
Qty: 100 | Refills: 0 | Status: ACTIVE | COMMUNITY
Start: 2022-11-23

## 2023-05-11 RX ORDER — MINOXIDIL 10 MG/1
10 TABLET ORAL
Qty: 147 | Refills: 0 | Status: DISCONTINUED | COMMUNITY
Start: 2022-06-13 | End: 2023-05-11

## 2023-05-11 RX ORDER — RANOLAZINE 1000 MG/1
1000 TABLET, EXTENDED RELEASE ORAL
Refills: 0 | Status: DISCONTINUED | COMMUNITY
Start: 2022-10-27 | End: 2023-05-11

## 2023-05-11 RX ORDER — INSULIN ASPART 100 [IU]/ML
100 INJECTION, SOLUTION INTRAVENOUS; SUBCUTANEOUS
Qty: 15 | Refills: 0 | Status: ACTIVE | COMMUNITY
Start: 2022-11-23

## 2023-05-12 LAB
ALBUMIN SERPL ELPH-MCNC: 3.7 G/DL
ALP BLD-CCNC: 92 U/L
ALT SERPL-CCNC: 15 U/L
ANION GAP SERPL CALC-SCNC: 9 MMOL/L
AST SERPL-CCNC: 17 U/L
BASOPHILS # BLD AUTO: 0.05 K/UL
BASOPHILS NFR BLD AUTO: 0.5 %
BILIRUB SERPL-MCNC: 0.2 MG/DL
BUN SERPL-MCNC: 31 MG/DL
CALCIUM SERPL-MCNC: 9 MG/DL
CHLORIDE SERPL-SCNC: 101 MMOL/L
CHOLEST SERPL-MCNC: 268 MG/DL
CO2 SERPL-SCNC: 24 MMOL/L
CREAT SERPL-MCNC: 0.73 MG/DL
EGFR: 80 ML/MIN/1.73M2
EOSINOPHIL # BLD AUTO: 0.12 K/UL
EOSINOPHIL NFR BLD AUTO: 1.3 %
ESTIMATED AVERAGE GLUCOSE: 243 MG/DL
GLUCOSE SERPL-MCNC: 403 MG/DL
HBA1C MFR BLD HPLC: 10.1 %
HCT VFR BLD CALC: 33.9 %
HDLC SERPL-MCNC: 65 MG/DL
HGB BLD-MCNC: 10.9 G/DL
IMM GRANULOCYTES NFR BLD AUTO: 0.2 %
LDLC SERPL CALC-MCNC: 154 MG/DL
LYMPHOCYTES # BLD AUTO: 1.93 K/UL
LYMPHOCYTES NFR BLD AUTO: 20.5 %
MAN DIFF?: NORMAL
MCHC RBC-ENTMCNC: 31.9 PG
MCHC RBC-ENTMCNC: 32.2 GM/DL
MCV RBC AUTO: 99.1 FL
MONOCYTES # BLD AUTO: 0.47 K/UL
MONOCYTES NFR BLD AUTO: 5 %
NEUTROPHILS # BLD AUTO: 6.84 K/UL
NEUTROPHILS NFR BLD AUTO: 72.5 %
NONHDLC SERPL-MCNC: 203 MG/DL
NT-PROBNP SERPL-MCNC: 1794 PG/ML
PLATELET # BLD AUTO: 289 K/UL
POTASSIUM SERPL-SCNC: 5 MMOL/L
PROT SERPL-MCNC: 6.9 G/DL
RBC # BLD: 3.42 M/UL
RBC # FLD: 14 %
SODIUM SERPL-SCNC: 135 MMOL/L
TRIGL SERPL-MCNC: 245 MG/DL
TROPONIN-T, HIGH SENSITIVITY: 23 NG/L
WBC # FLD AUTO: 9.43 K/UL

## 2023-05-12 NOTE — ASSESSMENT
[FreeTextEntry1] : - cp may be GI related stop asa stay on clopidogrel\par - trial of lansoprazole for her stomach \par - CAD on atorvastatin 40 mg daily \par - HTN stay on telmisartan 40 mg daily\par - palpitations on toprol xl 25 mg daily\par - Unclear why she is on minoxidil will stop \par - cp and sob will do echo and stress test she is unable to walk on the treadmill\par - may consider imdur if no improvement in symptoms\par - fu in four weeks

## 2023-05-12 NOTE — HISTORY OF PRESENT ILLNESS
[FreeTextEntry1] : 87 F Ben[place] SPeaking CAD s/p PCI LAD  at [facility] IDDM HTN HLD Hypothyroid Balance disorder PAD UTI\par \par accompanied by daughter referred by Dr. Salas for chest pain sharp pain she took nitroglycerin and patch. lasts for an hour. occurs at rest worse at night she gets sob and anxious when she gets the pain she gets sob when she walks with her walker. unclear if there is improvement with nitroglycerin. sometimes has improvement with mylanta.  \par \par \par ecg nsr 5/11/2023

## 2023-05-15 ENCOUNTER — NON-APPOINTMENT (OUTPATIENT)
Age: 88
End: 2023-05-15

## 2023-05-18 ENCOUNTER — APPOINTMENT (OUTPATIENT)
Dept: HEART AND VASCULAR | Facility: CLINIC | Age: 88
End: 2023-05-18
Payer: MEDICARE

## 2023-05-18 VITALS — HEIGHT: 56 IN | WEIGHT: 92 LBS | BODY MASS INDEX: 20.7 KG/M2

## 2023-05-18 PROCEDURE — 78452 HT MUSCLE IMAGE SPECT MULT: CPT

## 2023-05-18 PROCEDURE — A9500: CPT

## 2023-05-18 PROCEDURE — 93015 CV STRESS TEST SUPVJ I&R: CPT

## 2023-05-18 PROCEDURE — 93306 TTE W/DOPPLER COMPLETE: CPT

## 2023-05-24 ENCOUNTER — EMERGENCY (EMERGENCY)
Facility: HOSPITAL | Age: 88
LOS: 1 days | Discharge: HOME | End: 2023-05-24
Attending: STUDENT IN AN ORGANIZED HEALTH CARE EDUCATION/TRAINING PROGRAM | Admitting: STUDENT IN AN ORGANIZED HEALTH CARE EDUCATION/TRAINING PROGRAM
Payer: MEDICARE

## 2023-05-24 VITALS
HEIGHT: 56 IN | SYSTOLIC BLOOD PRESSURE: 115 MMHG | HEART RATE: 100 BPM | RESPIRATION RATE: 17 BRPM | DIASTOLIC BLOOD PRESSURE: 61 MMHG | TEMPERATURE: 98 F | OXYGEN SATURATION: 95 % | WEIGHT: 91.05 LBS

## 2023-05-24 VITALS
RESPIRATION RATE: 18 BRPM | DIASTOLIC BLOOD PRESSURE: 65 MMHG | SYSTOLIC BLOOD PRESSURE: 146 MMHG | OXYGEN SATURATION: 100 % | HEART RATE: 98 BPM

## 2023-05-24 DIAGNOSIS — Z79.82 LONG TERM (CURRENT) USE OF ASPIRIN: ICD-10-CM

## 2023-05-24 DIAGNOSIS — Z79.4 LONG TERM (CURRENT) USE OF INSULIN: ICD-10-CM

## 2023-05-24 DIAGNOSIS — E11.51 TYPE 2 DIABETES MELLITUS WITH DIABETIC PERIPHERAL ANGIOPATHY WITHOUT GANGRENE: ICD-10-CM

## 2023-05-24 DIAGNOSIS — R10.9 UNSPECIFIED ABDOMINAL PAIN: ICD-10-CM

## 2023-05-24 DIAGNOSIS — Z95.5 PRESENCE OF CORONARY ANGIOPLASTY IMPLANT AND GRAFT: ICD-10-CM

## 2023-05-24 DIAGNOSIS — K52.9 NONINFECTIVE GASTROENTERITIS AND COLITIS, UNSPECIFIED: ICD-10-CM

## 2023-05-24 DIAGNOSIS — E03.9 HYPOTHYROIDISM, UNSPECIFIED: ICD-10-CM

## 2023-05-24 DIAGNOSIS — I25.10 ATHEROSCLEROTIC HEART DISEASE OF NATIVE CORONARY ARTERY WITHOUT ANGINA PECTORIS: ICD-10-CM

## 2023-05-24 DIAGNOSIS — I10 ESSENTIAL (PRIMARY) HYPERTENSION: ICD-10-CM

## 2023-05-24 DIAGNOSIS — Z79.02 LONG TERM (CURRENT) USE OF ANTITHROMBOTICS/ANTIPLATELETS: ICD-10-CM

## 2023-05-24 LAB
ALBUMIN SERPL ELPH-MCNC: 3.6 G/DL — SIGNIFICANT CHANGE UP (ref 3.3–5)
ALP SERPL-CCNC: 103 U/L — SIGNIFICANT CHANGE UP (ref 40–120)
ALT FLD-CCNC: 12 U/L — SIGNIFICANT CHANGE UP (ref 10–45)
ANION GAP SERPL CALC-SCNC: 14 MMOL/L — SIGNIFICANT CHANGE UP (ref 5–17)
AST SERPL-CCNC: 21 U/L — SIGNIFICANT CHANGE UP (ref 10–40)
BILIRUB SERPL-MCNC: 0.6 MG/DL — SIGNIFICANT CHANGE UP (ref 0.2–1.2)
BUN SERPL-MCNC: 26 MG/DL — HIGH (ref 7–23)
CALCIUM SERPL-MCNC: 8.8 MG/DL — SIGNIFICANT CHANGE UP (ref 8.4–10.5)
CHLORIDE SERPL-SCNC: 100 MMOL/L — SIGNIFICANT CHANGE UP (ref 96–108)
CO2 SERPL-SCNC: 22 MMOL/L — SIGNIFICANT CHANGE UP (ref 22–31)
CREAT SERPL-MCNC: 0.8 MG/DL — SIGNIFICANT CHANGE UP (ref 0.5–1.3)
EGFR: 71 ML/MIN/1.73M2 — SIGNIFICANT CHANGE UP
GLUCOSE SERPL-MCNC: 357 MG/DL — HIGH (ref 70–99)
HCT VFR BLD CALC: 34.9 % — SIGNIFICANT CHANGE UP (ref 34.5–45)
HGB BLD-MCNC: 11.9 G/DL — SIGNIFICANT CHANGE UP (ref 11.5–15.5)
LACTATE SERPL-SCNC: 2 MMOL/L — SIGNIFICANT CHANGE UP (ref 0.5–2)
LIDOCAIN IGE QN: 10 U/L — SIGNIFICANT CHANGE UP (ref 7–60)
MCHC RBC-ENTMCNC: 32.3 PG — SIGNIFICANT CHANGE UP (ref 27–34)
MCHC RBC-ENTMCNC: 34.1 GM/DL — SIGNIFICANT CHANGE UP (ref 32–36)
MCV RBC AUTO: 94.8 FL — SIGNIFICANT CHANGE UP (ref 80–100)
NRBC # BLD: 0 /100 WBCS — SIGNIFICANT CHANGE UP (ref 0–0)
PLATELET # BLD AUTO: 296 K/UL — SIGNIFICANT CHANGE UP (ref 150–400)
POTASSIUM SERPL-MCNC: 5.3 MMOL/L — SIGNIFICANT CHANGE UP (ref 3.5–5.3)
POTASSIUM SERPL-SCNC: 5.3 MMOL/L — SIGNIFICANT CHANGE UP (ref 3.5–5.3)
PROT SERPL-MCNC: 7.1 G/DL — SIGNIFICANT CHANGE UP (ref 6–8.3)
RBC # BLD: 3.68 M/UL — LOW (ref 3.8–5.2)
RBC # FLD: 13.2 % — SIGNIFICANT CHANGE UP (ref 10.3–14.5)
SODIUM SERPL-SCNC: 136 MMOL/L — SIGNIFICANT CHANGE UP (ref 135–145)
TROPONIN T SERPL-MCNC: <0.01 NG/ML — SIGNIFICANT CHANGE UP (ref 0–0.01)
WBC # BLD: 13.75 K/UL — HIGH (ref 3.8–10.5)
WBC # FLD AUTO: 13.75 K/UL — HIGH (ref 3.8–10.5)

## 2023-05-24 PROCEDURE — 74177 CT ABD & PELVIS W/CONTRAST: CPT | Mod: 26,MA

## 2023-05-24 PROCEDURE — 99284 EMERGENCY DEPT VISIT MOD MDM: CPT

## 2023-05-24 RX ORDER — ACETAMINOPHEN 500 MG
625 TABLET ORAL ONCE
Refills: 0 | Status: COMPLETED | OUTPATIENT
Start: 2023-05-24 | End: 2023-05-24

## 2023-05-24 RX ORDER — SODIUM CHLORIDE 9 MG/ML
1000 INJECTION INTRAMUSCULAR; INTRAVENOUS; SUBCUTANEOUS ONCE
Refills: 0 | Status: COMPLETED | OUTPATIENT
Start: 2023-05-24 | End: 2023-05-24

## 2023-05-24 RX ORDER — IOHEXOL 300 MG/ML
30 INJECTION, SOLUTION INTRAVENOUS ONCE
Refills: 0 | Status: COMPLETED | OUTPATIENT
Start: 2023-05-24 | End: 2023-05-24

## 2023-05-24 RX ORDER — ONDANSETRON 8 MG/1
4 TABLET, FILM COATED ORAL ONCE
Refills: 0 | Status: COMPLETED | OUTPATIENT
Start: 2023-05-24 | End: 2023-05-24

## 2023-05-24 RX ORDER — ONDANSETRON 8 MG/1
1 TABLET, FILM COATED ORAL
Qty: 1 | Refills: 0
Start: 2023-05-24 | End: 2023-05-26

## 2023-05-24 RX ADMIN — Medication 625 MILLIGRAM(S): at 08:06

## 2023-05-24 RX ADMIN — SODIUM CHLORIDE 1000 MILLILITER(S): 9 INJECTION INTRAMUSCULAR; INTRAVENOUS; SUBCUTANEOUS at 08:06

## 2023-05-24 RX ADMIN — IOHEXOL 30 MILLILITER(S): 300 INJECTION, SOLUTION INTRAVENOUS at 06:21

## 2023-05-24 RX ADMIN — SODIUM CHLORIDE 1000 MILLILITER(S): 9 INJECTION INTRAMUSCULAR; INTRAVENOUS; SUBCUTANEOUS at 06:19

## 2023-05-24 RX ADMIN — ONDANSETRON 4 MILLIGRAM(S): 8 TABLET, FILM COATED ORAL at 06:22

## 2023-05-24 RX ADMIN — Medication 250 MILLIGRAM(S): at 06:29

## 2023-05-24 NOTE — ED ADULT NURSE NOTE - OBJECTIVE STATEMENT
Patient w/ Hx of CAD, DM, HTN, PAD, c/o of sudden chest pain/discomfort while watching TV last night, with episodes of nausea/vomitting/diarrhea, with chills, no fever.   EKG sinus tacchcyardia.

## 2023-05-24 NOTE — PATIENT PROFILE ADULT - TRANSPORTATION - OTHER DETAILS
Plan:  Medications:   • Reduce Tylenol  500mg tablet. Take 1000mg by mouth every 8 hours as needed for pain.    Diagnostics: None indicated at this time.  Interventions:   • None at this time. In the future may consider repeat Right TFESI L4 and L5 as needed.   Devices: None indicated at this time.  Consults: None indicated at this time.  Physical Therapy: Recommend ongoing activity as tolerated.  Opioid Risk Assessment: No longer on opioids   • Urine tox screen 04/05/2019  • Prescription Drug Monitoring Program verified this visit.  • Opioid contract 04/25/2019  • Discussed with patient the risks/benefits of opioids.  Follow-up in as needed with Dr. Swartz.  
Continue to progress with Dr. Gerardo Duong new for a rotator cuff repair. Pt. Education:  [] Yes  [] No  [x] Reviewed Prior HEP/Ed  Method of Education: [] Verbal  [] Demo  [] Written  HEP:   Comprehension of Education:  [] Verbalizes understanding. [] Demonstrates understanding. [] Needs review. [x] Demonstrates/verbalizes HEP/Ed previously given. Assessment  Reviewed current home exercise program provided. Demonstrated well with little to no verbal or manual cueing required. Followed up with Vasopneumatic compression with E-stim (IFC) for pain control. Goals  STG: (to be met in 9 treatments)  Patient will report an average pain level of 3-4/10 within the (R) shoulder at rest.  Patient will demonstrate independence with his Phase I (0-6 weeks) home exercise program   LTG: (to be met in 18 treatments)  To be determined at  the progress note update/9 treatments per the physician's orders/protocol               Patient goals: To return to the (R) shoulder to full function without limitation. Plan: [x] Continue per plan of care.    [] Other:      Treatment Charges: Mins Units Time In/Out   [] Evaluation       []  Low       []  Moderate       []  High      [x]  Modalities - E-stim(IFC) 15 1 5224-7956   [x]  Ther Exercise 15 1 9470-7900   []  Neuromuscular Re-ed      []  Gait Training      []  Manual Therapy      []  Ther Activities      []  Aquatics      [x]  Vasocompression 15 1 5413-1032   []  Cervical Traction      []  Other      Total Treatment time 30 min 30       Vasopneumatic Compression and E-stim (IFC) were performed concurrently    Time In: 1000           Time Out: 0230    Electronically signed by:  Serena Matta, PT DPT
trouble to get to doctor appts because no car service w/ insurance

## 2023-05-24 NOTE — ED PROVIDER NOTE - OBJECTIVE STATEMENT
87f hx cad s/p stent, dm. started feeling vague abdominal discomfort prior to going to bed last night. awoke at 4am with nausea, sweating, vomiting, and diarrhea. had some chest discomfort at that time. symptoms now significantly improved.

## 2023-05-24 NOTE — ED PROVIDER NOTE - PROGRESS NOTE DETAILS
Mason: Pt singed out with CTAP pending. On reassessment, pt reports improvement in symptoms. Feeling less nauseated. Has mild persistent tenderness in the right abdomen. Will follow CT results. Mason: Pt signed out with CTAP pending. On reassessment, pt reports improvement in symptoms. Feeling less nauseated. Has mild persistent tenderness in the right abdomen. Will follow CT results. Mason: CTAP notable for colitis. On reassessment, pt feeling improved. Tolerating po. Discussed all results with pt and her daughter. Will dc with short course of zofran. Continue oral hydration at home. Has close follow up with pmd.

## 2023-05-24 NOTE — ED PROVIDER NOTE - PHYSICAL EXAMINATION
General: Awake, alert and oriented. No acute distress. Well developed, hydrated and nourished. Appears stated age.  Skin: Skin in warm, dry and intact without rashes or lesions. Appropriate color for ethnicity  HENMT: head normocephalic and atraumatic; bilateral external ears without swelling. no nasal discharge. moist oral mucosa. supple neck, trachea midline  EYES: Conjunctiva clear. nonicteric sclera. EOM intact, Eyelids are normal in appearance without swelling or lesions.  Cardiac: well perfused, s1, s2, rrr  Respiratory: breathing comfortably on room air. no audible wheezing or stridor, lungs ctab, no chest wall tenderness to palpation  Abdominal: nondistended, soft, mild diffuse ttp, no cva ttp  MSK: Neck and back are without deformity, visible external skin changes, or signs of trauma. Curvature of the cervical, thoracic, and lumbar spine are within normal limits. no external signs of trauma. no apparent deficits in ROM of any extremity. no leg swelling or tenderness  Neurological: The patient is awake, alert and oriented to person, place, and time with normal speech. CN 2-12 grossly intact. no apparent deficits. Memory is normal and thought process is intact.  Psychiatric: Appropriate mood and affect. Good judgement and insight.

## 2023-05-24 NOTE — ED ADULT NURSE REASSESSMENT NOTE - NS ED NURSE REASSESS COMMENT FT1
Patient a/oX3, c/o of chest  discomfort with chills, episodes of nausea/vomitting and diarrhea, none since arrival to ED, no urinary symptoms, no SOB.  Afib on cardiac monitor, vital signs stable.  Right AC PIV #20 in place, all labs sent,  no complications.  Adminsitered NSS 1 L bolus, IV Tylenol, Zofran 4mg IVP.  Oral contrast ongoing.  CT scan pending.  NPO observed.

## 2023-05-24 NOTE — ED PROVIDER NOTE - PATIENT PORTAL LINK FT
You can access the FollowMyHealth Patient Portal offered by [facility] by registering at the following website: [email or web address]. By joining [employer]’s FollowMyHealth portal, you will also be able to view your health information using other applications (apps) compatible with our system.

## 2023-05-24 NOTE — ED PROVIDER NOTE - CLINICAL SUMMARY MEDICAL DECISION MAKING FREE TEXT BOX
Patient is hemodynamically stable presenting with abdominal pain concerning for possible intraabdominal pathology including appendicitis, diverticulitis, bowel obstruction. possible gastroenteritis/gastritis/enteritis/colitis. cholecystitis and biliary pathology on differential but less likely as pain is not located in RUQ, will obtain CT ad then consider need for US imaging.   unlikely cystitis or pyelonephritis as no urinary symptoms and pain is not located in suprapubic or perinephric regions and no symptoms of dysuria/frequency/hematuria    Considered differential diagnosis, but unlikely:  AAA rupture, Aortic dissection, mesenteric ischemia, perforated viscus, urolithiasis     Unlikely pelvic/GYN pathology due to history, presentation, and physical exam.     cp seems like a secondary complaint. The patient's risk factors for ACS were reviewed as well as the EKG.  will obtain CXR to evaluate for Pneumonia, Pneumothorax, Esophageal Tears.  no clinical signs of DVT, PERC/wells criteria not indicative of PE.  There are no signs of Pericarditis, Endocarditis, or Myocarditis based on risk factor analysis.  There is no fever.  There does not appear to be an Aortic Dissection either based on history, physical exam, and signs. initial ekg with poor baseline. repeat ordered

## 2023-05-24 NOTE — ED ADULT TRIAGE NOTE - CHIEF COMPLAINT QUOTE
Pt presents with c/o sudden onset generalized "chest pain", onset x approx one hour ago while at rest, accompanied by "SOB, N/V and diarrhea"

## 2023-05-24 NOTE — ED ADULT NURSE NOTE - NSICDXPASTMEDICALHX_GEN_ALL_CORE_FT
PAST MEDICAL HISTORY:  Abdominal wall hematoma     CAD (coronary artery disease) s/p stent to LAD Jan 2022    DM (diabetes mellitus)     HTN (hypertension)     Hypothyroidism     PAD (peripheral artery disease)     S/P angiogram of extremity

## 2023-05-24 NOTE — ED ADULT NURSE REASSESSMENT NOTE - NSFALLRISKINTERV_ED_ALL_ED
Assistance OOB with selected safe patient handling equipment if applicable/Assistance with ambulation/Communicate fall risk and risk factors to all staff, patient, and family/Monitor gait and stability/Provide visual cue: yellow wristband, yellow gown, etc/Reinforce activity limits and safety measures with patient and family/Call bell, personal items and telephone in reach/Instruct patient to call for assistance before getting out of bed/chair/stretcher/Non-slip footwear applied when patient is off stretcher/[place] to call system/Physically safe environment - no spills, clutter or unnecessary equipment/Purposeful Proactive Rounding/Room/bathroom lighting operational, light cord in reach

## 2023-05-24 NOTE — ED PROVIDER NOTE - NSFOLLOWUPINSTRUCTIONS_ED_ALL_ED_FT
Your CT showed inflammation of the intestine today called colitis. This can cause vomiting and diarrhea and typically resolves without medications (such as antibiotics).    It is important to stay well hydrated by drinking clear fluids such as water or sugar free gatorade.    Take one tab of zofran 30 minutes prior to eating, drinking or taking other medications in order to minimize nausea.    Please follow up with your primary care doctor for reevaluation.    Return to the Emergency Department if you develop fever>100.4F, worsening abdominal pain, blood in stool or any other concerns.

## 2023-06-05 ENCOUNTER — APPOINTMENT (OUTPATIENT)
Dept: ENDOCRINOLOGY | Facility: CLINIC | Age: 88
End: 2023-06-05

## 2023-07-05 ENCOUNTER — APPOINTMENT (OUTPATIENT)
Dept: HEART AND VASCULAR | Facility: CLINIC | Age: 88
End: 2023-07-05
Payer: MEDICARE

## 2023-07-05 ENCOUNTER — NON-APPOINTMENT (OUTPATIENT)
Age: 88
End: 2023-07-05

## 2023-07-05 VITALS
SYSTOLIC BLOOD PRESSURE: 140 MMHG | OXYGEN SATURATION: 97 % | HEART RATE: 83 BPM | BODY MASS INDEX: 20.7 KG/M2 | TEMPERATURE: 98 F | WEIGHT: 92 LBS | HEIGHT: 56 IN | DIASTOLIC BLOOD PRESSURE: 76 MMHG

## 2023-07-05 PROCEDURE — 99214 OFFICE O/P EST MOD 30 MIN: CPT | Mod: 25

## 2023-07-05 PROCEDURE — 93000 ELECTROCARDIOGRAM COMPLETE: CPT

## 2023-07-05 RX ORDER — DILTIAZEM HYDROCHLORIDE 120 MG/1
120 CAPSULE, EXTENDED RELEASE ORAL DAILY
Refills: 0 | Status: DISCONTINUED | COMMUNITY
Start: 2022-08-31 | End: 2023-07-05

## 2023-07-05 RX ORDER — CLOPIDOGREL BISULFATE 75 MG/1
75 TABLET, FILM COATED ORAL DAILY
Qty: 1 | Refills: 3 | Status: ACTIVE | COMMUNITY
Start: 2023-07-05

## 2023-07-05 NOTE — HISTORY OF PRESENT ILLNESS
[FreeTextEntry1] : 87 F [place] SPeaking CAD s/p PCI LAD  at [facility] IDDM HTN HLD Hypothyroid Balance disorder PAD UTI LV Dysfunction EF 49% \par \par here for fu of her chest pain when she walks and when she lies down does not matter whats shes doing she gets sob when she walks a sharp knife in her chest she has to stop breathing she had improvement of her abdominal with  PPI but not her chest pain pain can be the left or right pain lasts 3-5 min she is taking 2-3 NTG to feel better \par \par \par ecg nsr 7/5/2023\par MPI 5/18/2023 Normal perfusion EF 37%\par Echo EF 49% \par

## 2023-07-05 NOTE — ASSESSMENT
[FreeTextEntry1] : - initially met was on numerous antianginals including diltiazem, ranexa, metoprolol still with chest pain she had dizziness with ranexa and diltiazem\par - for now add imdur 30 mg at bedtime will cath if ok i don’t think her chest pain is anginal as she was on multiple meds did not feel better and still has ongoing chest pain \par - trial of lansoprazole for her stomach \par - CAD on atorvastatin 40 mg daily \par - HTN stay on telmisartan 40 mg daily\par - palpitations on toprol xl 25 mg daily\par - LDL is elevated due to elevated glucose given her age would prefer we address her hyperglycemia first before increasing statin dose \par - fu after cath

## 2023-07-07 RX ORDER — INSULIN GLARGINE 100 [IU]/ML
100 INJECTION, SOLUTION SUBCUTANEOUS
Refills: 0 | Status: ACTIVE | COMMUNITY
Start: 2022-10-27

## 2023-07-07 RX ORDER — METFORMIN HYDROCHLORIDE 1000 MG/1
1000 TABLET, COATED ORAL
Refills: 0 | Status: DISCONTINUED | COMMUNITY
Start: 2022-05-18 | End: 2023-07-07

## 2023-07-07 RX ORDER — ONDANSETRON 4 MG/1
4 TABLET, ORALLY DISINTEGRATING ORAL
Qty: 10 | Refills: 0 | Status: DISCONTINUED | COMMUNITY
Start: 2023-05-24 | End: 2023-07-07

## 2023-07-11 ENCOUNTER — APPOINTMENT (OUTPATIENT)
Dept: ENDOCRINOLOGY | Facility: CLINIC | Age: 88
End: 2023-07-11
Payer: MEDICARE

## 2023-07-11 VITALS — HEART RATE: 99 BPM | DIASTOLIC BLOOD PRESSURE: 96 MMHG | SYSTOLIC BLOOD PRESSURE: 193 MMHG

## 2023-07-11 VITALS
DIASTOLIC BLOOD PRESSURE: 84 MMHG | TEMPERATURE: 98 F | OXYGEN SATURATION: 98 % | WEIGHT: 95.9 LBS | RESPIRATION RATE: 16 BRPM | HEART RATE: 76 BPM | HEIGHT: 56 IN | SYSTOLIC BLOOD PRESSURE: 197 MMHG

## 2023-07-11 LAB
GLUCOSE BLDC GLUCOMTR-MCNC: 46
GLUCOSE BLDC GLUCOMTR-MCNC: 90

## 2023-07-11 PROCEDURE — 99214 OFFICE O/P EST MOD 30 MIN: CPT | Mod: 25

## 2023-07-11 PROCEDURE — 82962 GLUCOSE BLOOD TEST: CPT

## 2023-07-11 NOTE — H&P ADULT - HISTORY OF PRESENT ILLNESS
INCOMPLETE    Cardiologist: Dr. Mayela Beebe  Pharmacy:   Escort:     This is an 87 year old female PMHx CAD (s/p PCI LAD at [facility]), IDDM2, HTN, HLD, Hypothyroidism, Balance disorder, PAD, Urinary Retention c/b UTIs, who presented with chest pain with rest and ambulation, as well as BARRON/SOB. Pt  describes chest pain as a sharp stabbing sensation which causes her to stop breathing. CP is located on Left and Right sides and lasts 3-5minutes; relieved only with 2-3 Nitroglycerin. Pt also had abdominal pain relieved with PPI but did not resolve her CP. 5/18/23 TTE revealed LVEF 49%, mild to moderate LVH, mild LVDD; moderately dilated LA and RA; aneurysmal interatrial septum; severely calcified MV with mild MR. Per MD note, MPI 5/18/23 showed normal perfusion EF 37%.  Pt __DENIES/ENDORSES__ palpitations, dizziness, LOC, N/V/D, fever/chills/sick contact, diaphoresis, orthopnea/PND, and leg swelling.    In light of patient's risk factors, abnormal ____ results, and CCS class ____ anginal/anginal-equivalent symptoms, patient is referred to [facility] for cardiac catheterization w/ possible intervention if clinically indicated.   Cardiologist: Dr. Mayela Beebe  Pharmacy: [facility] pharmacy   Escort: Daughter     87 year old Gabonese speaking F [place], PMHx CAD (s/p PCI LAD at [facility] 1/28/22), IDDM2, HTN, HLD, Hypothyroidism, Balance disorder, PAD s/p ? intervention, Urinary Retention c/b UTIs, who presented with chest pain with rest and ambulation, as well as BARRON/SOB. Pt  describes chest pain as a sharp stabbing sensation which causes her to stop breathing. CP is located on Left and Right sides and lasts 3-5minutes; relieved only with 2-3 Nitroglycerin. Pt also had abdominal pain relieved with PPI but did not resolve her CP. Pt denies fever, chills, cough, PND/orthopnea, N/V/D, dizziness. syncope. Pt underwent MPI 5/18/23 revealing normal perfusion EF 37%. TTE 5/18/23 revealed LVEF 49%, mild to moderate LVH, mild LVDD; moderately dilated LA and RA; aneurysmal interatrial septum; severely calcified MV with mild MR. In light of patient's risk factors, abnormal stress test results, and CCS class IV anginal symptoms, patient is referred to [facility] for cardiac catheterization w/ possible intervention if clinically indicated.   Cardiologist: Dr. Mayela Beebe  Pharmacy: [facility] pharmacy   Escort: Daughter     87 year old Omani speaking F [place], PMHx CAD (s/p PCI LAD at [facility] 1/28/22), IDDM2, HTN, HLD, Hypothyroidism, Balance disorder, PAD s/p ? prior intervention, Urinary Retention c/b UTIs, who presented with chest pain with rest and ambulation, as well as BARRON/SOB. Pt describes chest pain as a sharp stabbing sensation which causes her to stop breathing. CP is located on Left and Right sides and lasts 3-5minutes; relieved only with 2-3 Nitroglycerin. Pt also had abdominal pain relieved with PPI but did not resolve her CP. Pt denies fever, chills, cough, PND/orthopnea, N/V/D, dizziness, syncope. Pt underwent MPI 5/18/23 revealing normal perfusion EF 37%. TTE 5/18/23 revealed LVEF 49%, mild to moderate LVH, mild LVDD; moderately dilated LA and RA; aneurysmal interatrial septum; severely calcified MV with mild MR. In light of patient's risk factors, abnormal stress test results, and CCS class IV anginal symptoms, patient is referred to [facility] for cardiac catheterization w/ possible intervention if clinically indicated.

## 2023-07-11 NOTE — H&P ADULT - MUSCULOSKELETAL
normal/normal gait/strength 5/5 bilateral upper extremities/strength 5/5 bilateral lower extremities

## 2023-07-11 NOTE — REVIEW OF SYSTEMS
[Joint Pain] : joint pain [Negative] : Gastrointestinal [Polyuria] : no polyuria [Polydipsia] : no polydipsia [de-identified] : +shaky, dizzy in setting of hypoglycemia, which resolved with correction

## 2023-07-11 NOTE — H&P ADULT - ASSESSMENT
87 year old Uzbek speaking F [place], PMHx CAD (s/p PCI LAD at [facility] 1/28/22), IDDM2, HTN, HLD, Hypothyroidism, Balance disorder, PAD s/p ? intervention, Urinary Retention c/b UTIs, who in light of patient's risk factors, abnormal stress test results, and CCS class IV anginal symptoms, patient is referred to [facility] for cardiac catheterization w/ possible intervention if clinically indicated.    ASA II, Mallampati II    Hgb/HCT: 10.4/31.0 Previously 11.9/34.9 5/2023. Pt denies bleeding, melena, BRBPR, hematuria. Pt reports compliance with Plavix 75mg daily, last dose 7/13/23 AM. Pt was given home dose Plavix 75mg and loaded with Aspirin 325mg PO x 1 pre cath.    cc bolus followed by 50 cc/hr ordered. EF 49% by ECHO. Pt is euvolemic on exam. Cr 0.77  /84 on arrival. Given TIC for home Telmisartan and Hydralazine 5mg IV x 1 prior to cath.   K 3.8, KCL 20mEq PO x 1 ordered.     Pt is a candidate for moderate sedation: Yes    Risks & benefits of procedure and alternative therapy have been explained to the patient including but not limited to: allergic reaction, bleeding w/possible need for blood transfusion, infection, renal and vascular compromise, limb damage, arrhythmia, stroke, vessel dissection/perforation, Myocardial infarction, emergent CABG. Informed consent obtained and in chart.   87 year old Latvian speaking F [place], PMHx CAD (s/p PCI LAD at [facility] 1/28/22), IDDM2, HTN, HLD, Hypothyroidism, Balance disorder, PAD s/p ? intervention, Urinary Retention c/b UTIs, who in light of patient's risk factors, abnormal stress test results, and CCS class IV anginal symptoms, patient is referred to [facility] for cardiac catheterization w/ possible intervention if clinically indicated.    ASA II, Mallampati II    Hgb/HCT: 10.4/31.0 Previously 11.9/34.9 5/2023. Pt denies bleeding, melena, BRBPR, hematuria. Pt reports compliance with Plavix 75mg daily, last dose 7/13/23 AM. Pt was given home dose Plavix 75mg and loaded with Aspirin 325mg PO x 1 pre cath.    cc bolus followed by 50 cc/hr ordered. EF 49% by ECHO. Pt is euvolemic on exam. Cr 0.77  /84 on arrival. Given TIC for home Telmisartan and Hydralazine 5mg IV x 1 prior to cath.   K 3.8, KCL 20mEq PO x 1 ordered.     Pt is a candidate for moderate sedation: Yes    As pt is [place] and did not have her hearing aides with her pt's daughter Nesha signed the consent for the procedure.     Risks & benefits of procedure and alternative therapy have been explained to the patient including but not limited to: allergic reaction, bleeding w/possible need for blood transfusion, infection, renal and vascular compromise, limb damage, arrhythmia, stroke, vessel dissection/perforation, Myocardial infarction, emergent CABG. Informed consent obtained and in chart.

## 2023-07-11 NOTE — ASSESSMENT
[FreeTextEntry1] : 1. T2D\par A1C goal <8%\par A1C - 10% ( 5/8/23) from 11.3% (2/6/23) from 10% (10/21/22)\par Current regimen: Lantus 10 units qhs, Novolog per scale-- If pre-meal sugar is <150, take 4 units, If pre-meal sugar is 150-200, take 5 units , If pre-meal sugar is 201-250, take 6 units, If pre-meal sugar is 251-300, take 7 units, If pre-meal sugar is 301-350, take 8 units, If pre-meal sugar is >350, take 9 units \par Daughter is administering insulin and monitoring patients BG with Libre2, verbalizes ranges (As above) with intermittent hypoglycemia\par Glucometer reading performed in office today is 46 2H postprandial (1H following prandial insulin) and then 90 following correction\par \par Daughter continues to endorse lability of glycemic patterns, which is likely resultant from her varying basal insulin, as well as providing corrections after eating that are too high for the sugar values.  For instance, today, 1H postprandially , so she administered 2 units of insulin with resultant 46, corrected to 90 in office.  Discussed glycemic goals of A1C <8.5%, fasting -150 to be conservative and 2H postprandial <225. Recommended to not adjust basal insulin based on bedtime BG. Prefers scale, recommend adjustments, as below:\par -- continue Lantus 10 units daily\par -- For Novolog, NO dosing with breakfast. To start to inject before meal (even if only <5 minutes before) and scaled updated to:  If pre-meal sugar is <100: NO Novolog, If pre-meal sugar is 100-150: 2 units, \par If pre-meal sugar is 151-200: 3 units, If pre-meal sugar is 201-250: 4 units, If pre-meal sugar is >251: 5 units  \par -- Continue use of Freestyle Libre2\par \par JOSE  has diabetes mellitus, has been using a home blood glucose monitor and reports testing 4 times a day using the meter. she  is insulin-treated with three or more daily injections of insulin. I have seen the JOSE within the last 6 months, I have observed their glucose control and determined that based on the above, the patient requires a therapeutic CGM to properly manage their blood sugars. her  insulin treatment regimen requires frequent adjustments on the basis of therapeutic CGM testing results . JOSE will have a follow-up visit within the first 6 months of CGM prescription in order to evaluate adherence to CGM regimen and their diabetes treatment plan.\par \par 2.  HLD\par On atorvastatin 40mg\par 2/6/23 \par -- continue statin, as above\par \par 4. Hypothyroidism\par Current thyroid regimen: Synthroid 75mcg QAM, on this dose for years\par 2/6/23 TSH 5\par -- continue LT4 75mcg\par -- repeat with next labs\par \par I will call daughter in 1 week to FU on BG with adjustments\par Follow up in 2 months, or sooner, as needed\par \par Melissa Jerome MS, FNP-BC, [facility]\par 07/11/2023

## 2023-07-11 NOTE — PHYSICAL EXAM
[Alert] : alert [No Acute Distress] : no acute distress [Normal Voice/Communication] : normal voice communication [Normal Hearing] : hearing was normal [No Respiratory Distress] : no respiratory distress [Normal Rate and Effort] : normal respiratory rate and effort [Oriented x3] : oriented to person, place, and time [Normal Affect] : the affect was normal [Normal Insight/Judgement] : insight and judgment were intact [de-identified] : Petite [de-identified] : Ambulates today with walker

## 2023-07-11 NOTE — HISTORY OF PRESENT ILLNESS
[FreeTextEntry1] : JOSE PABLO is a 87 year female with pmhx of T2D (>30 years ago), HTN, HLD, hypothyroidism who presents for T2D evaluation.\par \par Presents with Alana, daughter, providing Albanian interpretation, as needed.  She is active in mothers care\par Diabetes previously managed by PCP, Dr Duval\par \par Last (initial) visit with myself-- 05/08/2023\par \par Interval change:\par Per daughter +variability of sugar\par Last night, gave 12 units with fasting BG 58, oatmeal and coffee 205 45min after eating, 2 units administered 1H after eating given BG >200 with resultant hypo in office\par Sometimes gives Novolog before meal or waits to see what sugar is after. Only giving novolog if psot meal BG >200 ~1H after eating, with resultant hypo following breakfast predominantly.  Increases lantus if evening BG elevated to 12units with resultant lows\par Using Lumus 2, reader not available for review, left at home\par \par \par A1C - 10% (5/8/23) from11.3% (2/6/23) from 10% (10/21/22)\par Office Fingerstick -- 46 2H postprandial, repeat 15min after 15g CHO (and 10 min following additional 15g CHO to total 30g) 90\par \par Current medication:\par - Lantus 10 units at night \par - Novolog per scale-- If pre-meal sugar is <150, take 4 units, If pre-meal sugar is 150-200, take 5 units , If pre-meal sugar is 201-250, take 6 units, If pre-meal sugar is 251-300, take 7 units, If pre-meal sugar is 301-350, take 8 units, If pre-meal sugar is >350, take 9 units \par Telmisartan 40mg\par Atorvastatin 40mg\par \par Past medication:\par - metformin 1000mg, stopped with starting MDI and GI upset\par - Januvia\par - actos\par - glyburide\par \par JOSE reports they take their diabetes medication ALL of the time.\par JOSE ENDORSES hypoglycemia symptoms or BG <70, as above\par \par Diabetes Self-Management:\par -- fasting \par -- after breakfast 205 today (1H after) -215\par -- after lunch 250-300\par -- after dinner 250-350, pending eating\par \par Diet--\par Typically consumes ~2-3 meals/daily, +/- snacks\par - small meals\par \par Ophthalmology: >1 year, will schedule FU, referral provided 2/6/23\par Podiatry: >1 year, will schedule FU, referral provided 2/6/23\par \par 2. Hypothyroidism\par Dx ~15 years ago\par Current regimen: Synthroid 75 mcg QAM daily\par - on this dose for years\par No h/o radiation exposure

## 2023-07-11 NOTE — H&P ADULT - NSHPLABSRESULTS_GEN_ALL_CORE
ECG: NSR @ 78bpm, flat R in aVL, J point elevation V2                         10.4   8.70  )-----------( 253      ( 14 Jul 2023 07:56 )             31.0       07-14    139  |  105  |  28<H>  ----------------------------<  252<H>  3.8   |  26  |  0.77    Ca    8.9      14 Jul 2023 07:52    TPro  6.8  /  Alb  3.3  /  TBili  <0.2  /  DBili  x   /  AST  13  /  ALT  8<L>  /  AlkPhos  77  07-14      PT/INR - ( 14 Jul 2023 07:56 )   PT: 10.7 sec;   INR: 0.90          PTT - ( 14 Jul 2023 07:56 )  PTT:25.7 sec    CARDIAC MARKERS ( 14 Jul 2023 07:52 )  x     / x     / 63 U/L / x     / 2.1 ng/mL        Urinalysis Basic - ( 14 Jul 2023 07:52 )    Color: x / Appearance: x / SG: x / pH: x  Gluc: 252 mg/dL / Ketone: x  / Bili: x / Urobili: x   Blood: x / Protein: x / Nitrite: x   Leuk Esterase: x / RBC: x / WBC x   Sq Epi: x / Non Sq Epi: x / Bacteria: x

## 2023-07-11 NOTE — H&P ADULT - NSICDXPASTMEDICALHX_GEN_ALL_CORE_FT
PAST MEDICAL HISTORY:  Abdominal wall hematoma     CAD (coronary artery disease) s/p stent to LAD Jan 2022    DM (diabetes mellitus)     HTN (hypertension)     Hypothyroidism     PAD (peripheral artery disease)     S/P angiogram of extremity      PAST MEDICAL HISTORY:  Abdominal wall hematoma     CAD (coronary artery disease) s/p stent to LAD Jan 2022    HLD (hyperlipidemia)     HTN (hypertension)     Hypothyroidism     Insulin dependent type 2 diabetes mellitus     PAD (peripheral artery disease)     S/P angiogram of extremity     Urinary retention

## 2023-07-14 ENCOUNTER — INPATIENT (INPATIENT)
Facility: HOSPITAL | Age: 88
LOS: 0 days | Discharge: HOME | DRG: 247 | End: 2023-07-15
Attending: INTERNAL MEDICINE | Admitting: INTERNAL MEDICINE
Payer: MEDICARE

## 2023-07-14 DIAGNOSIS — Z90.49 ACQUIRED ABSENCE OF OTHER SPECIFIED PARTS OF DIGESTIVE TRACT: Chronic | ICD-10-CM

## 2023-07-14 DIAGNOSIS — Z95.5 PRESENCE OF CORONARY ANGIOPLASTY IMPLANT AND GRAFT: Chronic | ICD-10-CM

## 2023-07-14 LAB
A1C WITH ESTIMATED AVERAGE GLUCOSE RESULT: 9.9 % — HIGH (ref 4–5.6)
ALBUMIN SERPL ELPH-MCNC: 3.3 G/DL — SIGNIFICANT CHANGE UP (ref 3.3–5)
ALP SERPL-CCNC: 77 U/L — SIGNIFICANT CHANGE UP (ref 40–120)
ALT FLD-CCNC: 8 U/L — LOW (ref 10–45)
ANION GAP SERPL CALC-SCNC: 8 MMOL/L — SIGNIFICANT CHANGE UP (ref 5–17)
APTT BLD: 25.7 SEC — LOW (ref 27.5–35.5)
AST SERPL-CCNC: 13 U/L — SIGNIFICANT CHANGE UP (ref 10–40)
BASOPHILS # BLD AUTO: 0.05 K/UL — SIGNIFICANT CHANGE UP (ref 0–0.2)
BASOPHILS NFR BLD AUTO: 0.6 % — SIGNIFICANT CHANGE UP (ref 0–2)
BILIRUB SERPL-MCNC: <0.2 MG/DL — SIGNIFICANT CHANGE UP (ref 0.2–1.2)
BUN SERPL-MCNC: 28 MG/DL — HIGH (ref 7–23)
CALCIUM SERPL-MCNC: 8.9 MG/DL — SIGNIFICANT CHANGE UP (ref 8.4–10.5)
CHLORIDE SERPL-SCNC: 105 MMOL/L — SIGNIFICANT CHANGE UP (ref 96–108)
CHOLEST SERPL-MCNC: 253 MG/DL — HIGH
CK MB CFR SERPL CALC: 2.1 NG/ML — SIGNIFICANT CHANGE UP (ref 0–6.7)
CK SERPL-CCNC: 63 U/L — SIGNIFICANT CHANGE UP (ref 25–170)
CO2 SERPL-SCNC: 26 MMOL/L — SIGNIFICANT CHANGE UP (ref 22–31)
CREAT SERPL-MCNC: 0.77 MG/DL — SIGNIFICANT CHANGE UP (ref 0.5–1.3)
EGFR: 75 ML/MIN/1.73M2 — SIGNIFICANT CHANGE UP
EOSINOPHIL # BLD AUTO: 0.19 K/UL — SIGNIFICANT CHANGE UP (ref 0–0.5)
EOSINOPHIL NFR BLD AUTO: 2.2 % — SIGNIFICANT CHANGE UP (ref 0–6)
ESTIMATED AVERAGE GLUCOSE: 237 MG/DL — HIGH (ref 68–114)
GLUCOSE BLDC GLUCOMTR-MCNC: 150 MG/DL — HIGH (ref 70–99)
GLUCOSE BLDC GLUCOMTR-MCNC: 204 MG/DL — HIGH (ref 70–99)
GLUCOSE BLDC GLUCOMTR-MCNC: 207 MG/DL — HIGH (ref 70–99)
GLUCOSE BLDC GLUCOMTR-MCNC: 224 MG/DL — HIGH (ref 70–99)
GLUCOSE BLDC GLUCOMTR-MCNC: 307 MG/DL — HIGH (ref 70–99)
GLUCOSE SERPL-MCNC: 252 MG/DL — HIGH (ref 70–99)
HCT VFR BLD CALC: 31 % — LOW (ref 34.5–45)
HDLC SERPL-MCNC: 57 MG/DL — SIGNIFICANT CHANGE UP
HGB BLD-MCNC: 10.4 G/DL — LOW (ref 11.5–15.5)
IMM GRANULOCYTES NFR BLD AUTO: 0.3 % — SIGNIFICANT CHANGE UP (ref 0–0.9)
INR BLD: 0.9 — SIGNIFICANT CHANGE UP (ref 0.88–1.16)
ISTAT ACTK (ACTIVATED CLOTTING TIME KAOLIN): 329 SEC — HIGH (ref 74–137)
LIPID PNL WITH DIRECT LDL SERPL: 150 MG/DL — HIGH
LYMPHOCYTES # BLD AUTO: 2.07 K/UL — SIGNIFICANT CHANGE UP (ref 1–3.3)
LYMPHOCYTES # BLD AUTO: 23.8 % — SIGNIFICANT CHANGE UP (ref 13–44)
MAGNESIUM SERPL-MCNC: 1.9 MG/DL — SIGNIFICANT CHANGE UP (ref 1.6–2.6)
MCHC RBC-ENTMCNC: 31.7 PG — SIGNIFICANT CHANGE UP (ref 27–34)
MCHC RBC-ENTMCNC: 33.5 GM/DL — SIGNIFICANT CHANGE UP (ref 32–36)
MCV RBC AUTO: 94.5 FL — SIGNIFICANT CHANGE UP (ref 80–100)
MONOCYTES # BLD AUTO: 0.7 K/UL — SIGNIFICANT CHANGE UP (ref 0–0.9)
MONOCYTES NFR BLD AUTO: 8 % — SIGNIFICANT CHANGE UP (ref 2–14)
NEUTROPHILS # BLD AUTO: 5.66 K/UL — SIGNIFICANT CHANGE UP (ref 1.8–7.4)
NEUTROPHILS NFR BLD AUTO: 65.1 % — SIGNIFICANT CHANGE UP (ref 43–77)
NON HDL CHOLESTEROL: 196 MG/DL — HIGH
NRBC # BLD: 0 /100 WBCS — SIGNIFICANT CHANGE UP (ref 0–0)
PLATELET # BLD AUTO: 253 K/UL — SIGNIFICANT CHANGE UP (ref 150–400)
POTASSIUM SERPL-MCNC: 3.8 MMOL/L — SIGNIFICANT CHANGE UP (ref 3.5–5.3)
POTASSIUM SERPL-SCNC: 3.8 MMOL/L — SIGNIFICANT CHANGE UP (ref 3.5–5.3)
PROT SERPL-MCNC: 6.8 G/DL — SIGNIFICANT CHANGE UP (ref 6–8.3)
PROTHROM AB SERPL-ACNC: 10.7 SEC — SIGNIFICANT CHANGE UP (ref 10.5–13.4)
RBC # BLD: 3.28 M/UL — LOW (ref 3.8–5.2)
RBC # FLD: 13 % — SIGNIFICANT CHANGE UP (ref 10.3–14.5)
SODIUM SERPL-SCNC: 139 MMOL/L — SIGNIFICANT CHANGE UP (ref 135–145)
TRIGL SERPL-MCNC: 231 MG/DL — HIGH
WBC # BLD: 8.7 K/UL — SIGNIFICANT CHANGE UP (ref 3.8–10.5)
WBC # FLD AUTO: 8.7 K/UL — SIGNIFICANT CHANGE UP (ref 3.8–10.5)

## 2023-07-14 PROCEDURE — 92928 PRQ TCAT PLMT NTRAC ST 1 LES: CPT | Mod: LC

## 2023-07-14 PROCEDURE — 92920 PRQ TRLUML C ANGIOP 1ART&/BR: CPT | Mod: LD

## 2023-07-14 PROCEDURE — 99152 MOD SED SAME PHYS/QHP 5/>YRS: CPT

## 2023-07-14 PROCEDURE — 93010 ELECTROCARDIOGRAM REPORT: CPT

## 2023-07-14 PROCEDURE — 93458 L HRT ARTERY/VENTRICLE ANGIO: CPT | Mod: 26,59

## 2023-07-14 RX ORDER — GLUCAGON INJECTION, SOLUTION 0.5 MG/.1ML
1 INJECTION, SOLUTION SUBCUTANEOUS ONCE
Refills: 0 | Status: DISCONTINUED | OUTPATIENT
Start: 2023-07-14 | End: 2023-07-15

## 2023-07-14 RX ORDER — ACETAMINOPHEN 500 MG
650 TABLET ORAL EVERY 6 HOURS
Refills: 0 | Status: DISCONTINUED | OUTPATIENT
Start: 2023-07-14 | End: 2023-07-15

## 2023-07-14 RX ORDER — HYDRALAZINE HCL 50 MG
5 TABLET ORAL ONCE
Refills: 0 | Status: COMPLETED | OUTPATIENT
Start: 2023-07-14 | End: 2023-07-14

## 2023-07-14 RX ORDER — CLOPIDOGREL BISULFATE 75 MG/1
75 TABLET, FILM COATED ORAL ONCE
Refills: 0 | Status: COMPLETED | OUTPATIENT
Start: 2023-07-14 | End: 2023-07-14

## 2023-07-14 RX ORDER — LOSARTAN POTASSIUM 100 MG/1
50 TABLET, FILM COATED ORAL ONCE
Refills: 0 | Status: COMPLETED | OUTPATIENT
Start: 2023-07-14 | End: 2023-07-14

## 2023-07-14 RX ORDER — SODIUM CHLORIDE 9 MG/ML
1000 INJECTION, SOLUTION INTRAVENOUS
Refills: 0 | Status: DISCONTINUED | OUTPATIENT
Start: 2023-07-14 | End: 2023-07-15

## 2023-07-14 RX ORDER — INSULIN LISPRO 100/ML
3 VIAL (ML) SUBCUTANEOUS
Refills: 0 | Status: DISCONTINUED | OUTPATIENT
Start: 2023-07-14 | End: 2023-07-15

## 2023-07-14 RX ORDER — ASPIRIN/CALCIUM CARB/MAGNESIUM 324 MG
325 TABLET ORAL ONCE
Refills: 0 | Status: COMPLETED | OUTPATIENT
Start: 2023-07-14 | End: 2023-07-14

## 2023-07-14 RX ORDER — LEVOTHYROXINE SODIUM 125 MCG
75 TABLET ORAL DAILY
Refills: 0 | Status: DISCONTINUED | OUTPATIENT
Start: 2023-07-14 | End: 2023-07-15

## 2023-07-14 RX ORDER — CLOPIDOGREL BISULFATE 75 MG/1
75 TABLET, FILM COATED ORAL DAILY
Refills: 0 | Status: DISCONTINUED | OUTPATIENT
Start: 2023-07-15 | End: 2023-07-15

## 2023-07-14 RX ORDER — CHLORHEXIDINE GLUCONATE 213 G/1000ML
1 SOLUTION TOPICAL ONCE
Refills: 0 | Status: DISCONTINUED | OUTPATIENT
Start: 2023-07-14 | End: 2023-07-15

## 2023-07-14 RX ORDER — ISOSORBIDE MONONITRATE 60 MG/1
30 TABLET, EXTENDED RELEASE ORAL DAILY
Refills: 0 | Status: DISCONTINUED | OUTPATIENT
Start: 2023-07-14 | End: 2023-07-15

## 2023-07-14 RX ORDER — ATORVASTATIN CALCIUM 80 MG/1
40 TABLET, FILM COATED ORAL AT BEDTIME
Refills: 0 | Status: DISCONTINUED | OUTPATIENT
Start: 2023-07-14 | End: 2023-07-15

## 2023-07-14 RX ORDER — DEXTROSE 50 % IN WATER 50 %
15 SYRINGE (ML) INTRAVENOUS ONCE
Refills: 0 | Status: DISCONTINUED | OUTPATIENT
Start: 2023-07-14 | End: 2023-07-15

## 2023-07-14 RX ORDER — SODIUM CHLORIDE 9 MG/ML
500 INJECTION INTRAMUSCULAR; INTRAVENOUS; SUBCUTANEOUS
Refills: 0 | Status: COMPLETED | OUTPATIENT
Start: 2023-07-14 | End: 2023-07-14

## 2023-07-14 RX ORDER — LOSARTAN POTASSIUM 100 MG/1
50 TABLET, FILM COATED ORAL DAILY
Refills: 0 | Status: DISCONTINUED | OUTPATIENT
Start: 2023-07-14 | End: 2023-07-15

## 2023-07-14 RX ORDER — PANTOPRAZOLE SODIUM 20 MG/1
40 TABLET, DELAYED RELEASE ORAL
Refills: 0 | Status: DISCONTINUED | OUTPATIENT
Start: 2023-07-14 | End: 2023-07-15

## 2023-07-14 RX ORDER — METOPROLOL TARTRATE 50 MG
25 TABLET ORAL DAILY
Refills: 0 | Status: DISCONTINUED | OUTPATIENT
Start: 2023-07-14 | End: 2023-07-15

## 2023-07-14 RX ORDER — INSULIN LISPRO 100/ML
VIAL (ML) SUBCUTANEOUS
Refills: 0 | Status: DISCONTINUED | OUTPATIENT
Start: 2023-07-14 | End: 2023-07-15

## 2023-07-14 RX ORDER — ASPIRIN/CALCIUM CARB/MAGNESIUM 324 MG
81 TABLET ORAL DAILY
Refills: 0 | Status: DISCONTINUED | OUTPATIENT
Start: 2023-07-14 | End: 2023-07-15

## 2023-07-14 RX ORDER — DEXTROSE 50 % IN WATER 50 %
25 SYRINGE (ML) INTRAVENOUS ONCE
Refills: 0 | Status: DISCONTINUED | OUTPATIENT
Start: 2023-07-14 | End: 2023-07-15

## 2023-07-14 RX ORDER — SODIUM CHLORIDE 9 MG/ML
130.5 INJECTION INTRAMUSCULAR; INTRAVENOUS; SUBCUTANEOUS
Refills: 0 | Status: DISCONTINUED | OUTPATIENT
Start: 2023-07-14 | End: 2023-07-15

## 2023-07-14 RX ORDER — INSULIN GLARGINE 100 [IU]/ML
12 INJECTION, SOLUTION SUBCUTANEOUS AT BEDTIME
Refills: 0 | Status: DISCONTINUED | OUTPATIENT
Start: 2023-07-14 | End: 2023-07-15

## 2023-07-14 RX ORDER — DEXTROSE 50 % IN WATER 50 %
12.5 SYRINGE (ML) INTRAVENOUS ONCE
Refills: 0 | Status: DISCONTINUED | OUTPATIENT
Start: 2023-07-14 | End: 2023-07-15

## 2023-07-14 RX ORDER — SODIUM CHLORIDE 9 MG/ML
250 INJECTION INTRAMUSCULAR; INTRAVENOUS; SUBCUTANEOUS ONCE
Refills: 0 | Status: COMPLETED | OUTPATIENT
Start: 2023-07-14 | End: 2023-07-14

## 2023-07-14 RX ORDER — POTASSIUM CHLORIDE 20 MEQ
20 PACKET (EA) ORAL ONCE
Refills: 0 | Status: COMPLETED | OUTPATIENT
Start: 2023-07-14 | End: 2023-07-14

## 2023-07-14 RX ADMIN — Medication 3 UNIT(S): at 17:23

## 2023-07-14 RX ADMIN — ATORVASTATIN CALCIUM 40 MILLIGRAM(S): 80 TABLET, FILM COATED ORAL at 22:17

## 2023-07-14 RX ADMIN — ISOSORBIDE MONONITRATE 30 MILLIGRAM(S): 60 TABLET, EXTENDED RELEASE ORAL at 14:32

## 2023-07-14 RX ADMIN — SODIUM CHLORIDE 500 MILLILITER(S): 9 INJECTION INTRAMUSCULAR; INTRAVENOUS; SUBCUTANEOUS at 09:16

## 2023-07-14 RX ADMIN — Medication 325 MILLIGRAM(S): at 09:15

## 2023-07-14 RX ADMIN — Medication 25 MILLIGRAM(S): at 14:32

## 2023-07-14 RX ADMIN — CLOPIDOGREL BISULFATE 75 MILLIGRAM(S): 75 TABLET, FILM COATED ORAL at 09:16

## 2023-07-14 RX ADMIN — LOSARTAN POTASSIUM 50 MILLIGRAM(S): 100 TABLET, FILM COATED ORAL at 09:16

## 2023-07-14 RX ADMIN — Medication 5 MILLIGRAM(S): at 09:16

## 2023-07-14 RX ADMIN — SODIUM CHLORIDE 130 MILLILITER(S): 9 INJECTION INTRAMUSCULAR; INTRAVENOUS; SUBCUTANEOUS at 11:26

## 2023-07-14 RX ADMIN — PANTOPRAZOLE SODIUM 40 MILLIGRAM(S): 20 TABLET, DELAYED RELEASE ORAL at 14:32

## 2023-07-14 RX ADMIN — Medication 20 MILLIEQUIVALENT(S): at 09:15

## 2023-07-14 RX ADMIN — Medication 4: at 17:22

## 2023-07-14 RX ADMIN — SODIUM CHLORIDE 50 MILLILITER(S): 9 INJECTION INTRAMUSCULAR; INTRAVENOUS; SUBCUTANEOUS at 09:17

## 2023-07-14 NOTE — PATIENT PROFILE ADULT - TRANSPORTATION - CHOOSE ALL APPLY
Ronnie Gonzalez (:  1963) is a 62 y.o. male,Established patient, here for evaluation of the following chief complaint(s):  Hypertension         ASSESSMENT/PLAN:  1. Need for immunization against influenza  -     INFLUENZA, QUADV, 3 YRS AND OLDER, IM PF, PREFILL SYR OR SDV, 0.5ML (AFLURIA QUADV, PF)  2. Controlled type 2 diabetes mellitus without complication, without long-term current use of insulin (HCC)  -     POCT glycosylated hemoglobin (Hb A1C)  3. Routine medical exam  -     CBC With Auto Differential; Future  -     Comprehensive Metabolic Panel, Fasting; Future  -     Lipid Panel; Future  4. Essential hypertension      a1c 5.6   Last year 6.0  Can see me annually  No other concerns  BP is fantastic    No follow-ups on file. Subjective   SUBJECTIVE/OBJECTIVE:  HPI: 62 M here to follow up on chronic health conditions    Including DMII, HTN, Hyperchol    All well controlled, not on insulin only metformin low dose    Review of Systems   Constitutional: Negative for chills, fatigue and fever. HENT: Negative for congestion, postnasal drip and sore throat. Eyes: Negative for visual disturbance. Respiratory: Negative for cough, chest tightness, shortness of breath and wheezing. Cardiovascular: Negative. Gastrointestinal: Negative for abdominal distention, abdominal pain, constipation and diarrhea. Genitourinary: Negative for difficulty urinating, dysuria, frequency and urgency. Musculoskeletal: Negative for arthralgias, back pain and joint swelling. Skin: Negative for rash. Neurological: Negative for dizziness, weakness and light-headedness. Psychiatric/Behavioral: Negative for agitation, decreased concentration and sleep disturbance. Objective   Physical Exam  Vitals and nursing note reviewed. Constitutional:       Appearance: Normal appearance. HENT:      Head: Normocephalic and atraumatic. Eyes:      Extraocular Movements: Extraocular movements intact. Cardiovascular:      Rate and Rhythm: Normal rate and regular rhythm. Pulses: Normal pulses. Heart sounds: Normal heart sounds. Pulmonary:      Effort: Pulmonary effort is normal.      Breath sounds: Normal breath sounds. Abdominal:      General: Abdomen is flat. Palpations: Abdomen is soft. Musculoskeletal:         General: Normal range of motion. Cervical back: Normal range of motion and neck supple. Skin:     General: Skin is warm. Neurological:      General: No focal deficit present. Mental Status: He is alert and oriented to person, place, and time. An electronic signature was used to authenticate this note.     --Eric Mandel MD other

## 2023-07-14 NOTE — PATIENT PROFILE ADULT - FALL HARM RISK - HARM RISK INTERVENTIONS

## 2023-07-15 ENCOUNTER — TRANSCRIPTION ENCOUNTER (OUTPATIENT)
Age: 88
End: 2023-07-15

## 2023-07-15 VITALS — TEMPERATURE: 97 F

## 2023-07-15 LAB
ANION GAP SERPL CALC-SCNC: 12 MMOL/L — SIGNIFICANT CHANGE UP (ref 5–17)
BUN SERPL-MCNC: 26 MG/DL — HIGH (ref 7–23)
CALCIUM SERPL-MCNC: 8.4 MG/DL — SIGNIFICANT CHANGE UP (ref 8.4–10.5)
CHLORIDE SERPL-SCNC: 105 MMOL/L — SIGNIFICANT CHANGE UP (ref 96–108)
CO2 SERPL-SCNC: 20 MMOL/L — LOW (ref 22–31)
CREAT SERPL-MCNC: 0.85 MG/DL — SIGNIFICANT CHANGE UP (ref 0.5–1.3)
EGFR: 66 ML/MIN/1.73M2 — SIGNIFICANT CHANGE UP
GLUCOSE BLDC GLUCOMTR-MCNC: 223 MG/DL — HIGH (ref 70–99)
GLUCOSE BLDC GLUCOMTR-MCNC: 226 MG/DL — HIGH (ref 70–99)
GLUCOSE BLDC GLUCOMTR-MCNC: 233 MG/DL — HIGH (ref 70–99)
GLUCOSE BLDC GLUCOMTR-MCNC: 270 MG/DL — HIGH (ref 70–99)
GLUCOSE SERPL-MCNC: 236 MG/DL — HIGH (ref 70–99)
HCT VFR BLD CALC: 29.2 % — LOW (ref 34.5–45)
HGB BLD-MCNC: 9.6 G/DL — LOW (ref 11.5–15.5)
MAGNESIUM SERPL-MCNC: 1.9 MG/DL — SIGNIFICANT CHANGE UP (ref 1.6–2.6)
MCHC RBC-ENTMCNC: 31.6 PG — SIGNIFICANT CHANGE UP (ref 27–34)
MCHC RBC-ENTMCNC: 32.9 GM/DL — SIGNIFICANT CHANGE UP (ref 32–36)
MCV RBC AUTO: 96.1 FL — SIGNIFICANT CHANGE UP (ref 80–100)
NRBC # BLD: 0 /100 WBCS — SIGNIFICANT CHANGE UP (ref 0–0)
PLATELET # BLD AUTO: 220 K/UL — SIGNIFICANT CHANGE UP (ref 150–400)
POTASSIUM SERPL-MCNC: 4.5 MMOL/L — SIGNIFICANT CHANGE UP (ref 3.5–5.3)
POTASSIUM SERPL-SCNC: 4.5 MMOL/L — SIGNIFICANT CHANGE UP (ref 3.5–5.3)
RBC # BLD: 3.04 M/UL — LOW (ref 3.8–5.2)
RBC # FLD: 13.2 % — SIGNIFICANT CHANGE UP (ref 10.3–14.5)
SODIUM SERPL-SCNC: 137 MMOL/L — SIGNIFICANT CHANGE UP (ref 135–145)
WBC # BLD: 10.74 K/UL — HIGH (ref 3.8–10.5)
WBC # FLD AUTO: 10.74 K/UL — HIGH (ref 3.8–10.5)

## 2023-07-15 PROCEDURE — 99233 SBSQ HOSP IP/OBS HIGH 50: CPT

## 2023-07-15 PROCEDURE — 93010 ELECTROCARDIOGRAM REPORT: CPT

## 2023-07-15 RX ORDER — ATORVASTATIN CALCIUM 80 MG/1
1 TABLET, FILM COATED ORAL
Qty: 30 | Refills: 3
Start: 2023-07-15 | End: 2023-11-11

## 2023-07-15 RX ORDER — ASPIRIN/CALCIUM CARB/MAGNESIUM 324 MG
1 TABLET ORAL
Qty: 30 | Refills: 11
Start: 2023-07-15 | End: 2024-07-08

## 2023-07-15 RX ORDER — INSULIN GLARGINE 100 [IU]/ML
4 INJECTION, SOLUTION SUBCUTANEOUS ONCE
Refills: 0 | Status: COMPLETED | OUTPATIENT
Start: 2023-07-15 | End: 2023-07-15

## 2023-07-15 RX ORDER — CLOPIDOGREL BISULFATE 75 MG/1
1 TABLET, FILM COATED ORAL
Qty: 30 | Refills: 11
Start: 2023-07-15 | End: 2024-07-08

## 2023-07-15 RX ORDER — MAGNESIUM OXIDE 400 MG ORAL TABLET 241.3 MG
400 TABLET ORAL ONCE
Refills: 0 | Status: COMPLETED | OUTPATIENT
Start: 2023-07-15 | End: 2023-07-15

## 2023-07-15 RX ADMIN — Medication 3: at 12:10

## 2023-07-15 RX ADMIN — Medication 25 MILLIGRAM(S): at 13:46

## 2023-07-15 RX ADMIN — CLOPIDOGREL BISULFATE 75 MILLIGRAM(S): 75 TABLET, FILM COATED ORAL at 12:08

## 2023-07-15 RX ADMIN — LOSARTAN POTASSIUM 50 MILLIGRAM(S): 100 TABLET, FILM COATED ORAL at 06:00

## 2023-07-15 RX ADMIN — Medication 3 UNIT(S): at 09:30

## 2023-07-15 RX ADMIN — Medication 81 MILLIGRAM(S): at 12:08

## 2023-07-15 RX ADMIN — Medication 3 UNIT(S): at 12:10

## 2023-07-15 RX ADMIN — PANTOPRAZOLE SODIUM 40 MILLIGRAM(S): 20 TABLET, DELAYED RELEASE ORAL at 06:00

## 2023-07-15 RX ADMIN — Medication 75 MICROGRAM(S): at 06:00

## 2023-07-15 RX ADMIN — INSULIN GLARGINE 4 UNIT(S): 100 INJECTION, SOLUTION SUBCUTANEOUS at 02:25

## 2023-07-15 RX ADMIN — MAGNESIUM OXIDE 400 MG ORAL TABLET 400 MILLIGRAM(S): 241.3 TABLET ORAL at 08:24

## 2023-07-15 RX ADMIN — Medication 2: at 06:38

## 2023-07-15 RX ADMIN — ISOSORBIDE MONONITRATE 30 MILLIGRAM(S): 60 TABLET, EXTENDED RELEASE ORAL at 12:09

## 2023-07-15 NOTE — DISCHARGE NOTE PROVIDER - NSDCFUSCHEDAPPT_GEN_ALL_CORE_FT
[facility]  [facility] [address]  Scheduled Appointment: 09/08/2023

## 2023-07-15 NOTE — DISCHARGE NOTE NURSING/CASE MANAGEMENT/SOCIAL WORK - NSDCPEFALRISK_GEN_ALL_CORE
For information on Fall & Injury Prevention, visit: [email or web address] OR  [email or web address] OR  https://www.cdc.gov/steadi/patient.html

## 2023-07-15 NOTE — DISCHARGE NOTE PROVIDER - NSDCCPCAREPLAN_GEN_ALL_CORE_FT
PRINCIPAL DISCHARGE DIAGNOSIS  Diagnosis: CAD (coronary artery disease)  Assessment and Plan of Treatment: -You underwent a cardiac catheterization 7/14/23 and the blockage in your ostial marginal and left anterior descending arteries were opened with stent placement. NEVER MISS A DOSE OF ASPIRIN OR PLAVIX; IF YOU DO, YOU ARE AT RISK OF YOUR STENT CLOSING AND HAVING A HEART ATTACK. DO NOT STOP THESE TWO MEDICATIONS UNLESS INSTRUCTED TO DO SO BY YOUR CARDIOLOGIST. Your procedure was done through your groin. You do not need to keep this area covered and you may shower. Please avoid any heavy lifting  (no more than 3 to 5 lbs) or strenuous activity for five days. If you develop any swelling, bleeding, hardening of the skin (hematoma formation), acute pain, numbness/tingling  in your arm or leg please contact your doctor immediately or call our 24/7 line: [phone] Please return to the hospital/seek immediate medical attention if worsening of symptoms- including not limited to chest pain, shortness of breath. Please follow up with Dr. Beebe in 1-2 weeks. Please call his office and make an appointment to see him. Please continue a heart healthy diet low in sodium, cholesterol, and fat.  Please take aspirin and plavix daily to prevent stent closure. If you do not, you are at risk of having a heart attack.      SECONDARY DISCHARGE DIAGNOSES  Diagnosis: DM type 2, not at goal  Assessment and Plan of Treatment: DO NOT TAKE your Metformin for two days. This medication can interact with the contrast used during your procedure therefore we want to ensure the contrast has left your body prior to you restarting your Metformin. Maintain a low Carbohydrate diet. Check your blood sugar regularly. For blood sugar that is too high or too low please call your Doctor or go to the Emergency Room as necessary. Please follow up with your  Your Hemoglobin A1c is 9.9%. Your goal Hemoglobin A1c is less than 7.0%, This number measures your average blood sugar level over the last three months. Ways to lower this number include: diet, exercise, monitoring your fingersticks, adhering to your medication regimen and regular follow up during you Endocrinologist/Primary Care Doctor.      Diagnosis: HLD (hyperlipidemia)  Assessment and Plan of Treatment: Your cholesterol panel is abnormal. Your LDL is 150mg/dL and your goal LDL is less than 100 mg/dL. LDL is also known as "bad cholesterol" because it takes cholesterol to your arteries, where it may collect in artery walls. Too much cholesterol in your arteries may lead to a buildup of plaque known as atherosclerosis and can cause heart disease.  You can lower your LDL with medications and lifestyle modifications such as weight loss in overweight patients, aerobic exercise, and eating diets lower in saturated fats  -Your HDL is 57mg/dL and your goal HDL is greater than 50 mg/dL. The higher the HDL the better; HDL is known as “good cholesterol” because it transports cholesterol to your liver to be expelled from your body.  - Your home atorvastatin was increased to 80mg. Please take this daily.

## 2023-07-15 NOTE — CHART NOTE - NSCHARTNOTEFT_GEN_A_CORE
I attempted to see the patient at 2:05pm to consult on her hyperglycemia. However patient had already been discharged and left the premises. Review of outpatient records shows that she had seen endocrinology NP Melissa Jerome on 7/11/23 in clinic and she and her daughter were given a plan for diabetes management. Ms. Haywoodman's note indicated that she would call the patient to follow up on how the plan is working. I have sent MsCortney Queenie an email to facilitate followup care.

## 2023-07-15 NOTE — DISCHARGE NOTE PROVIDER - CARE PROVIDER_API CALL
Mayela Beebe  Cardiology  [address] [place] [id number]  Phone: [phone]  Fax: [phone]  Established Patient  Follow Up Time: 2 weeks

## 2023-07-15 NOTE — DISCHARGE NOTE PROVIDER - HOSPITAL COURSE
86yo Arabic speaking F [place], PMHx CAD (s/p PCI LAD at [facility] 1/28/22), IDDM2, HTN, HLD, Hypothyroidism, Balance disorder, PAD s/p ? prior intervention, Urinary Retention c/b UTIs, who presented with chest pain with rest and ambulation, as well as BARRON/SOB. Pt describes chest pain as a sharp stabbing sensation which causes her to stop breathing. CP is located on Left and Right sides and lasts 3-5minutes; relieved only with 2-3 Nitroglycerin. Pt also had abdominal pain relieved with PPI but did not resolve her CP. Pt denies fever, chills, cough, PND/orthopnea, N/V/D, dizziness, syncope. Pt underwent MPI 5/18/23 revealing normal perfusion EF 37%. TTE 5/18/23 revealed LVEF 49%, mild to moderate LVH, mild LVDD; moderately dilated LA and RA; aneurysmal interatrial septum; severely calcified MV with mild MR. In light of patient's risk factors, abnormal stress test results, and CCS class IV anginal symptoms, patient is referred to [facility] for cardiac catheterization w/ possible intervention if clinically indicated.    S/p cardiac cath w/ MARTINA x 1 (Xience 3.5 x 15) OM1 and PTCA mid LAD (ISR), other findings of LM mild luminal irregularities, LCx mild luminal irregularities, proximal RCA 20% and patent stents. EDP 6, given 250 bolus intraprocedure. Right groin Angioseal.     Post procedure, C/o 5/10 L chest pain on 5 Uris at 4:46pm, self resolved. EKG w/ new inferior ST depressions. Chest pain improved. Repeat EKG 6:50pm w/ improving inferior ST depressions, TWi V3. IC fellow evaluated, ___________    Home atorvastatin augmented to 80mg qd.  Endocrine consulted for A1C 9.9%. Recs ________    Pt is asymptomatic at this time and denies chest pain, SOB, BARRON, palpitations, dizziness, LOC, N/V, diaphoresis, orthopnea/PND, and leg swelling. Pt able to ambulate and void without complication. VSS. Labs and telemetry reviewed. Right groin access site stable and dressing C/D/I.  Pt is a candidate for discharge per Dr. De Los Santos. Pt given appropriate discharge instructions, pt states they have an appropriate amount of their previous home meds unchanged from this visit at home, and any new medications were sent to their pharmacy. Pt instructed to f/u with Dr. Beebe in 1-2 weeks.     86yo English speaking F [place], PMHx CAD (s/p PCI LAD at [facility] 1/28/22), IDDM2, HTN, HLD, Hypothyroidism, Balance disorder, PAD s/p ? prior intervention, Urinary Retention c/b UTIs, who presented with chest pain with rest and ambulation, as well as BARRON/SOB. Pt describes chest pain as a sharp stabbing sensation which causes her to stop breathing. CP is located on Left and Right sides and lasts 3-5minutes; relieved only with 2-3 Nitroglycerin. Pt also had abdominal pain relieved with PPI but did not resolve her CP. Pt denies fever, chills, cough, PND/orthopnea, N/V/D, dizziness, syncope. Pt underwent MPI 5/18/23 revealing normal perfusion EF 37%. TTE 5/18/23 revealed LVEF 49%, mild to moderate LVH, mild LVDD; moderately dilated LA and RA; aneurysmal interatrial septum; severely calcified MV with mild MR. In light of patient's risk factors, abnormal stress test results, and CCS class IV anginal symptoms, patient is referred to [facility] for cardiac catheterization w/ possible intervention if clinically indicated.    S/p cardiac cath w/ MARTINA x 1 (Xience 3.5 x 15) OM1 and PTCA mid LAD (ISR), other findings of LM mild luminal irregularities, LCx mild luminal irregularities, proximal RCA 20% and patent stents. EDP 6, given 250 bolus intraprocedure. Right groin Angioseal.     Post procedure, C/o 5/10 L chest pain on 5 Uris at 4:46pm, self resolved. EKG w/ new inferior ST depressions. Chest pain improved. Repeat EKG 6:50pm w/ improving inferior ST depressions, TWi V3. IC fellow evaluated, chest pain improved and unconcerned.     Home atorvastatin augmented to 80mg qd.  Endocrine consulted for A1C 9.9%. Recs ________    Pt is asymptomatic at this time and denies chest pain, SOB, BARRON, palpitations, dizziness, LOC, N/V, diaphoresis, orthopnea/PND, and leg swelling. Pt able to ambulate and void without complication. VSS. Labs and telemetry reviewed. Right groin access site stable and dressing C/D/I.  Pt is a candidate for discharge per Dr. De Los Santos. Pt given appropriate discharge instructions, pt states they have an appropriate amount of their previous home meds unchanged from this visit at home, and any new medications were sent to their pharmacy. Pt instructed to f/u with Dr. Beebe in 1-2 weeks.     86yo German speaking F [place], PMHx CAD (s/p PCI LAD at [facility] 1/28/22), IDDM2, HTN, HLD, Hypothyroidism, Balance disorder, PAD s/p ? prior intervention, Urinary Retention c/b UTIs, who presented with chest pain with rest and ambulation, as well as BARRON/SOB. Pt describes chest pain as a sharp stabbing sensation which causes her to stop breathing. CP is located on Left and Right sides and lasts 3-5minutes; relieved only with 2-3 Nitroglycerin. Pt also had abdominal pain relieved with PPI but did not resolve her CP. Pt underwent MPI 5/18/23 revealing normal perfusion EF 37%. TTE 5/18/23 revealed LVEF 49%, mild to moderate LVH, mild LVDD; moderately dilated LA and RA; aneurysmal interatrial septum; severely calcified MV with mild MR. In light of patient's risk factors, abnormal stress test results, and CCS class IV anginal symptoms, patient is referred to [facility] for cardiac catheterization w/ possible intervention if clinically indicated.    S/p cardiac cath w/ MARTINA x 1 (Xience 3.5 x 15) OM1 and PTCA mid LAD (ISR), other findings of LM mild luminal irregularities, LCx mild luminal irregularities, proximal RCA 20% and patent stents. EDP 6, given 250 bolus intraprocedure. Right groin Angioseal.     Post procedure, C/o 5/10 L chest pain on 5 Uris at 4:46pm, self resolved. EKG w/ new inferior ST depressions. Chest pain improved. Repeat EKG 6:50pm w/ improving inferior ST depressions, TWi V3. IC fellow evaluated, chest pain improved and unconcerned.     Home atorvastatin augmented to 80mg qd. A1C 9.9%. Endocrinology originally consulted, however patient stated she follows up with the endocrinology clinic associated with [facility] and with an NP Queenie. She states she last saw them one week ago and have follow up appointments in 2 months. They are declining an endocrinology consult at this time.    Pt is asymptomatic at this time and denies chest pain, SOB, BARRON, palpitations, dizziness, LOC, N/V, diaphoresis, orthopnea/PND, and leg swelling. Pt able to ambulate and void without complication. VSS. Labs and telemetry reviewed. Right groin access site stable and dressing C/D/I.  Pt is a candidate for discharge per Dr. De Los Santos. Pt given appropriate discharge instructions, pt states they have an appropriate amount of their previous home meds unchanged from this visit at home, and any new medications were sent to their pharmacy. Pt instructed to f/u with Dr. Beebe in 1-2 weeks.                Dx: Heart Failure this Admit, History of Heart Failure, Unstable Angina/ACS/NSTEMI/STEMI: YES/NO            If YES: 7 day Follow-Up Appointment Made: Yes/No, Yes: Date/Time; IF No, why not?           Cardiac Rehab (STEMI/NSTEMI/ACS/Unstable Angina/CHF/Post PCI):            *Education on benefits of Cardiac Rehab provided to patient: Yes/No         *Referral and Prescription Given for Cardiac Rehab : Yes/No.  If No, Why Not?         *Pt given list of locations & instructed to contact their insurance company to review list of participating providers    Reasons for No Cardiac Rehab Referral Rx (Must select 1 or more options):                Patient Refused            Medical Reason: ex needs Home Care, Home PT            Patient lacks medical coverage for Cardiac Rehab            Pt discharged to Nursing Care/[facility]UPESH/Long term Care Facility            Patient Lacks Transportation or no cardiac rehab within 60 minutes driving range            Patient already participates in Cardiac Rehab            Other: (provide details) ex: Hospice patient      AMI: Beta Blocker Prescribed: Yes/No. If no, Why not?            ACE-I/ARB Prescribed: Yes/No. If no, Why not?    Statin Prescribed (STEMI/NSTEMI/UA &/OR PCI this admission):  Yes/No; If No, Why Not?  DAPT: Prescriptions for Aspirin/Plavix/Brilinta/Effient e-prescribed to patient's pharmacy Yes/No__.                *No Aspirin/Plavix/Brilinta/Effient prescribed due to ___.       88yo Azeri speaking F [place], PMHx CAD (s/p PCI LAD at [facility] 1/28/22), IDDM2, HTN, HLD, Hypothyroidism, Balance disorder, PAD s/p ? prior intervention, Urinary Retention c/b UTIs, who presented with chest pain with rest and ambulation, as well as BARRON/SOB. Pt describes chest pain as a sharp stabbing sensation which causes her to stop breathing. CP is located on Left and Right sides and lasts 3-5minutes; relieved only with 2-3 Nitroglycerin. Pt also had abdominal pain relieved with PPI but did not resolve her CP. Pt underwent MPI 5/18/23 revealing normal perfusion EF 37%. TTE 5/18/23 revealed LVEF 49%, mild to moderate LVH, mild LVDD; moderately dilated LA and RA; aneurysmal interatrial septum; severely calcified MV with mild MR. In light of patient's risk factors, abnormal stress test results, and CCS class IV anginal symptoms, patient is referred to [facility] for cardiac catheterization w/ possible intervention if clinically indicated.    S/p cardiac cath w/ MARTINA x 1 (Xience 3.5 x 15) OM1 and PTCA mid LAD (ISR), other findings of LM mild luminal irregularities, LCx mild luminal irregularities, proximal RCA 20% and patent stents. EDP 6, given 250 bolus intraprocedure. Right groin Angioseal.     Post procedure, C/o 5/10 L chest pain on 5 Uris at 4:46pm, self resolved. EKG w/ new inferior ST depressions. Chest pain improved. Repeat EKG 6:50pm w/ improving inferior ST depressions, TWi V3. IC fellow evaluated, chest pain improved and unconcerned.     Home atorvastatin augmented to 80mg qd. A1C 9.9%. Endocrinology originally consulted, however patient stated she follows up with the endocrinology clinic associated with [facility] and with an NP Queenie. She states she last saw them one week ago and have follow up appointments in 2 months. They are declining an endocrinology consult at this time.    Pt is asymptomatic at this time and denies chest pain, SOB, BARRON, palpitations, dizziness, LOC, N/V, diaphoresis, orthopnea/PND, and leg swelling. Pt able to ambulate and void without complication. VSS. Labs and telemetry reviewed. Right groin access site stable and dressing C/D/I.  Pt is a candidate for discharge per Dr. De Los Santos. Pt given appropriate discharge instructions, pt states they have an appropriate amount of their previous home meds unchanged from this visit at home, and any new medications were sent to their pharmacy. Pt instructed to f/u with Dr. Beebe in 1-2 weeks.           Cardiac Rehab (STEMI/NSTEMI/ACS/Unstable Angina/CHF/Post PCI):            *Education on benefits of Cardiac Rehab provided to patient: Yes         *Referral and Prescription Given for Cardiac Rehab : Yes         *Pt given list of locations & instructed to contact their insurance company to review list of participating providers    Statin Prescribed (STEMI/NSTEMI/UA &/OR PCI this admission):  Yes  DAPT: Prescriptions for Aspirin/Plavix/Brilinta/Effient e-prescribed to patient's pharmacy Yes

## 2023-07-15 NOTE — DISCHARGE NOTE NURSING/CASE MANAGEMENT/SOCIAL WORK - PATIENT PORTAL LINK FT
You can access the FollowMyHealth Patient Portal offered by [facility] by registering at the following website: [email or web address]. By joining [facility]’s FollowMyHealth portal, you will also be able to view your health information using other applications (apps) compatible with our system.

## 2023-07-15 NOTE — CONSULT NOTE ADULT - SUBJECTIVE AND OBJECTIVE BOX
HPI: This is a 87year old hearing-impaired woman with PMH of poorly controlled DM, hypothyroidism, CAD s/p PCI to LAD 2022, PAD, HTN, admitted for elective cath 23 for whom endocrine is consulted for hyperglycemia.    She had abnormal stress test results and class IV anginal symptoms as an outpatient and was admitted 23 for elective cardiac cath - had MARTINA to OM1 and also PTCA mild LAD    #Type 2 Diabetes history:  -Diagnosed at  .   -complications:   -Home regimen: Basaglar 12u qhs, Novolog 5u qAc    #Hypothyroidism:   -on LT4 75mcg at home    ROS:  -Constitutional: no weight change, no f/c/sweats, no fatigue. no insomnia. no heat/cold intolerance.  -Skin: No change. no hair changes. no bruisability.  -Eye:  no vision changes, no peripheral vision loss. No blurriness.  -Neck: no neck enlargement, dysphagia, odynophagia.   -CV: no cp, no palpitations, no change in exercise tolerance, no daytime somnolence  -Pulm: no SOB  -GI: no n/v/c/d/abd pain  -: no polyuria/polydipsia. no nocturia.   -MSK: no LE edema, no proximal muscle weakness, no hx fractures  -Neuro: no tremors. No dizziness, lightheadedness.   -10 point ROS otherwise negative    Past Medical History:  CAD (coronary artery disease)  s/p stent to LAD 2022    PAD (peripheral artery disease)    S/P angiogram of extremity    HTN (hypertension)    DM (diabetes mellitus)    Hypothyroidism    Abdominal wall hematoma    Insulin dependent type 2 diabetes mellitus    HLD (hyperlipidemia)    Urinary retention        Past Surgical history:  S/P cholecystectomy    S/P coronary artery stent placement        Family History:  No pertinent family history in first degree relatives    No pertinent family history in first degree relatives        Allergies: diltiazem (Other)  Allergy Status Unknown  Ranexa (Other)      Home Medications:  atorvastatin 40 mg oral tablet: 1 tab(s) orally once a day (at bedtime)  Basaglar KwikPen 100 units/mL subcutaneous solution: 12 unit(s) subcutaneous once a day (at bedtime)  clopidogrel 75 mg oral tablet: 1 tab(s) orally once a day  isosorbide mononitrate 30 mg oral tablet, extended release: 1 tab(s) orally once a day  lansoprazole 30 mg oral tablet, disintegratin tab(s) orally once a day (before a meal)  levothyroxine 75 mcg (0.075 mg) oral tablet: 1 tab(s) orally once a day  metoprolol succinate 25 mg oral tablet, extended release: 1 tab(s) orally every 24 hours  nitroglycerin 0.2 mg/hr transdermal film, extended release: 1 patch transdermal once a day  telmisartan 40 mg oral tablet: 1 tab(s) orally once a day    Inpatient Medications:  acetaminophen     Tablet .. 650 milliGRAM(s) Oral every 6 hours PRN  aspirin enteric coated 81 milliGRAM(s) Oral daily  atorvastatin 40 milliGRAM(s) Oral at bedtime  chlorhexidine 4% Liquid 1 Application(s) Topical Once  clopidogrel Tablet 75 milliGRAM(s) Oral daily  dextrose 5%. 1000 milliLiter(s) IV Continuous <Continuous>  dextrose 5%. 1000 milliLiter(s) IV Continuous <Continuous>  dextrose 50% Injectable 25 Gram(s) IV Push once  dextrose 50% Injectable 12.5 Gram(s) IV Push once  dextrose 50% Injectable 25 Gram(s) IV Push once  dextrose Oral Gel 15 Gram(s) Oral once PRN  glucagon  Injectable 1 milliGRAM(s) IntraMuscular once  insulin glargine Injectable (LANTUS) 12 Unit(s) SubCutaneous at bedtime  insulin lispro (ADMELOG) corrective regimen sliding scale   SubCutaneous three times a day before meals  insulin lispro Injectable (ADMELOG) 3 Unit(s) SubCutaneous three times a day before meals  isosorbide   mononitrate ER Tablet (IMDUR) 30 milliGRAM(s) Oral daily  levothyroxine 75 MICROGram(s) Oral daily  losartan 50 milliGRAM(s) Oral daily  metoprolol succinate ER 25 milliGRAM(s) Oral daily  pantoprazole    Tablet 40 milliGRAM(s) Oral before breakfast  sodium chloride 0.9%. 130.5 milliLiter(s) IV Continuous <Continuous>    Exam:  Vitals:  T(C): 36.5 (07-15-23 @ 08:50), Max: 36.8 (23 @ 18:00)  HR: 76 (07-15-23 @ 08:36) (76 - 88)  BP: 145/65 (07-15-23 @ 08:36) (124/57 - 190/84)  ABP: --  RR: 18 (07-15-23 @ 08:36) (15 - 18)  SpO2: 97% (07-15-23 @ 08:36) (95% - 98%)      23 @ 07:01  -  07-15-23 @ 07:00  --------------------------------------------------------  IN: 460 mL / OUT: 750 mL / NET: -290 mL      GEN: well appearing, NAD, intact mental status, A&O  SKIN: no skin hyperpigmentation  HEENT: EOMI, no proptosis, no lid lag  NECK: no thyromegaly, no nodules.   CV: RRR  LUNGS: CTAB  ABD: Soft, nontender, nondistended. Normoactive bowel sounds  NEURO: No tremors. Normoactive reflexes  EXT no edema  PSYCH: normal mood, affect    Labs:  07-15    137  |  105  |  26<H>  ----------------------------<  236<H>  4.5   |  20<L>  |  0.85    Ca    8.4      15 Jul 2023 06:36  Mg     1.9     07-15    TPro  6.8  /  Alb  3.3  /  TBili  <0.2  /  DBili  x   /  AST  13  /  ALT  8<L>  /  AlkPhos  77                          9.6    10.74 )-----------( 220      ( 15 Jul 2023 06:36 )             29.2   LIVER FUNCTIONS - ( 2023 07:52 )  Alb: 3.3 g/dL / Pro: 6.8 g/dL / ALK PHOS: 77 U/L / ALT: 8 U/L / AST: 13 U/L / GGT: x         CAPILLARY BLOOD GLUCOSE      POCT Blood Glucose.: 223 mg/dL (15 Jul 2023 06:30)  POCT Blood Glucose.: 233 mg/dL (15 Jul 2023 02:23)  POCT Blood Glucose.: 226 mg/dL (15 Jul 2023 01:13)  POCT Blood Glucose.: 207 mg/dL (2023 23:38)  POCT Blood Glucose.: 150 mg/dL (2023 21:44)  POCT Blood Glucose.: 307 mg/dL (2023 17:13)  POCT Blood Glucose.: 204 mg/dL (2023 10:57)  Osmolality, Serum: 284 mosm/kg (22 @ 05:30)  Osmolality, Random Urine: 409 mosm/kg (22 @ 16:35)  Cortisol AM, Serum: 11.270 ug/dL (22 @ 07:58)  Beta Hydroxy-Butyrate: 0.0 mmoL/L (22 @ 19:10)  Thyroid Stimulating Hormone, Serum: 3.230 uIU/mL (22 @ 19:10)  T4, Serum: 6.96 ug/dL (22 @ 19:10)  Triiodothyronine, Total (T3 Total): 51 ng/dL *L* (22 @ 19:10)  Beta Hydroxy-Butyrate: 4.4 mmoL/L *H* (22 @ 15:44)        Assessment:     Recommendations:

## 2023-07-15 NOTE — DISCHARGE NOTE PROVIDER - NSDCMRMEDTOKEN_GEN_ALL_CORE_FT
atorvastatin 40 mg oral tablet: 1 tab(s) orally once a day (at bedtime)  Basaglar KwikPen 100 units/mL subcutaneous solution: 12 unit(s) subcutaneous once a day (at bedtime)  clopidogrel 75 mg oral tablet: 1 tab(s) orally once a day  isosorbide mononitrate 30 mg oral tablet, extended release: 1 tab(s) orally once a day  lansoprazole 30 mg oral tablet, disintegratin tab(s) orally once a day (before a meal)  levothyroxine 75 mcg (0.075 mg) oral tablet: 1 tab(s) orally once a day  metoprolol succinate 25 mg oral tablet, extended release: 1 tab(s) orally every 24 hours  nitroglycerin 0.2 mg/hr transdermal film, extended release: 1 patch transdermal once a day  NovoLOG FlexPen 100 units/mL injectable solution: 5 unit(s) subcutaneous 3 times a day (with meals)   telmisartan 40 mg oral tablet: 1 tab(s) orally once a day   aspirin 81 mg oral delayed release tablet: 1 tab(s) orally once a day  atorvastatin 80 mg oral tablet: 1 tab(s) orally once a day  Basaglar KwikPen 100 units/mL subcutaneous solution: 12 unit(s) subcutaneous once a day (at bedtime)  Cardiac Rehab: 3x/week for 12 weeks for CAD  clopidogrel 75 mg oral tablet: 1 tab(s) orally once a day  isosorbide mononitrate 30 mg oral tablet, extended release: 1 tab(s) orally once a day  lansoprazole 30 mg oral tablet, disintegratin tab(s) orally once a day (before a meal)  levothyroxine 75 mcg (0.075 mg) oral tablet: 1 tab(s) orally once a day  metoprolol succinate 25 mg oral tablet, extended release: 1 tab(s) orally every 24 hours  nitroglycerin 0.2 mg/hr transdermal film, extended release: 1 patch transdermal once a day  NovoLOG FlexPen 100 units/mL injectable solution: 5 unit(s) subcutaneous 3 times a day (with meals)   telmisartan 40 mg oral tablet: 1 tab(s) orally once a day

## 2023-07-18 ENCOUNTER — NON-APPOINTMENT (OUTPATIENT)
Age: 88
End: 2023-07-18

## 2023-07-21 DIAGNOSIS — I25.84 CORONARY ATHEROSCLEROSIS DUE TO CALCIFIED CORONARY LESION: ICD-10-CM

## 2023-07-21 DIAGNOSIS — E11.65 TYPE 2 DIABETES MELLITUS WITH HYPERGLYCEMIA: ICD-10-CM

## 2023-07-21 DIAGNOSIS — E03.9 HYPOTHYROIDISM, UNSPECIFIED: ICD-10-CM

## 2023-07-21 DIAGNOSIS — I25.119 ATHEROSCLEROTIC HEART DISEASE OF NATIVE CORONARY ARTERY WITH UNSPECIFIED ANGINA PECTORIS: ICD-10-CM

## 2023-07-21 DIAGNOSIS — Z79.4 LONG TERM (CURRENT) USE OF INSULIN: ICD-10-CM

## 2023-07-21 DIAGNOSIS — I10 ESSENTIAL (PRIMARY) HYPERTENSION: ICD-10-CM

## 2023-07-21 DIAGNOSIS — Z95.5 PRESENCE OF CORONARY ANGIOPLASTY IMPLANT AND GRAFT: ICD-10-CM
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
## 2023-07-21 DIAGNOSIS — E78.00 PURE HYPERCHOLESTEROLEMIA, UNSPECIFIED: ICD-10-CM

## 2023-07-21 DIAGNOSIS — T82.855A STENOSIS OF CORONARY ARTERY STENT, INITIAL ENCOUNTER: ICD-10-CM

## 2023-07-21 DIAGNOSIS — Z79.82 LONG TERM (CURRENT) USE OF ASPIRIN: ICD-10-CM
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
## 2023-07-21 DIAGNOSIS — R07.9 CHEST PAIN, UNSPECIFIED: ICD-10-CM

## 2023-07-21 DIAGNOSIS — Z79.02 LONG TERM (CURRENT) USE OF ANTITHROMBOTICS/ANTIPLATELETS: ICD-10-CM

## 2023-07-21 DIAGNOSIS — E11.51 TYPE 2 DIABETES MELLITUS WITH DIABETIC PERIPHERAL ANGIOPATHY WITHOUT GANGRENE: ICD-10-CM

## 2023-07-21 DIAGNOSIS — R33.9 RETENTION OF URINE, UNSPECIFIED: ICD-10-CM

## 2023-07-24 DIAGNOSIS — S80.11XA CONTUSION OF RIGHT LOWER LEG, INITIAL ENCOUNTER: ICD-10-CM

## 2023-07-26 ENCOUNTER — NON-APPOINTMENT (OUTPATIENT)
Age: 88
End: 2023-07-26

## 2023-07-26 ENCOUNTER — APPOINTMENT (OUTPATIENT)
Dept: HEART AND VASCULAR | Facility: CLINIC | Age: 88
End: 2023-07-26
Payer: MEDICARE

## 2023-07-26 ENCOUNTER — APPOINTMENT (OUTPATIENT)
Dept: VASCULAR SURGERY | Facility: CLINIC | Age: 88
End: 2023-07-26
Payer: MEDICARE

## 2023-07-26 VITALS
TEMPERATURE: 98.1 F | OXYGEN SATURATION: 98 % | DIASTOLIC BLOOD PRESSURE: 74 MMHG | HEIGHT: 56 IN | HEART RATE: 84 BPM | WEIGHT: 92 LBS | SYSTOLIC BLOOD PRESSURE: 154 MMHG | BODY MASS INDEX: 20.7 KG/M2

## 2023-07-26 PROCEDURE — 93925 LOWER EXTREMITY STUDY: CPT

## 2023-07-26 PROCEDURE — 99214 OFFICE O/P EST MOD 30 MIN: CPT | Mod: 25

## 2023-07-26 PROCEDURE — 93000 ELECTROCARDIOGRAM COMPLETE: CPT

## 2023-07-27 NOTE — PHYSICAL EXAM
----- Message from BEATRIS Adler sent at 7/27/2023  9:31 AM CDT -----  No significant adenoid hypertrophy   [Well Developed] : well developed [Well Nourished] : well nourished [No Acute Distress] : no acute distress [Normal Conjunctiva] : normal conjunctiva [Normal Venous Pressure] : normal venous pressure [No Carotid Bruit] : no carotid bruit [Normal S1, S2] : normal S1, S2 [No Murmur] : no murmur [No Rub] : no rub [No Gallop] : no gallop [Clear Lung Fields] : clear lung fields [Good Air Entry] : good air entry [No Respiratory Distress] : no respiratory distress  [Soft] : abdomen soft [Non Tender] : non-tender [No Masses/organomegaly] : no masses/organomegaly [Normal Bowel Sounds] : normal bowel sounds [Normal Gait] : normal gait [No Edema] : no edema [No Cyanosis] : no cyanosis [No Clubbing] : no clubbing [No Varicosities] : no varicosities [No Rash] : no rash [No Skin Lesions] : no skin lesions [Moves all extremities] : moves all extremities [No Focal Deficits] : no focal deficits [Alert and Oriented] : alert and oriented [Normal Speech] : normal speech [Normal memory] : normal memory

## 2023-07-31 ENCOUNTER — INPATIENT (INPATIENT)
Facility: HOSPITAL | Age: 88
LOS: 1 days | Discharge: HOME | DRG: 312 | End: 2023-08-02
Attending: INTERNAL MEDICINE | Admitting: INTERNAL MEDICINE
Payer: MEDICARE

## 2023-07-31 VITALS
WEIGHT: 93.92 LBS | TEMPERATURE: 98 F | RESPIRATION RATE: 16 BRPM | SYSTOLIC BLOOD PRESSURE: 96 MMHG | DIASTOLIC BLOOD PRESSURE: 61 MMHG | OXYGEN SATURATION: 98 % | HEART RATE: 71 BPM | HEIGHT: 56 IN

## 2023-07-31 DIAGNOSIS — I25.10 ATHEROSCLEROTIC HEART DISEASE OF NATIVE CORONARY ARTERY WITHOUT ANGINA PECTORIS: ICD-10-CM

## 2023-07-31 DIAGNOSIS — I10 ESSENTIAL (PRIMARY) HYPERTENSION: ICD-10-CM

## 2023-07-31 DIAGNOSIS — Z90.49 ACQUIRED ABSENCE OF OTHER SPECIFIED PARTS OF DIGESTIVE TRACT: Chronic | ICD-10-CM

## 2023-07-31 DIAGNOSIS — E11.9 TYPE 2 DIABETES MELLITUS WITHOUT COMPLICATIONS: ICD-10-CM

## 2023-07-31 DIAGNOSIS — R55 SYNCOPE AND COLLAPSE: ICD-10-CM

## 2023-07-31 DIAGNOSIS — E03.9 HYPOTHYROIDISM, UNSPECIFIED: ICD-10-CM

## 2023-07-31 DIAGNOSIS — E78.00 PURE HYPERCHOLESTEROLEMIA, UNSPECIFIED: ICD-10-CM

## 2023-07-31 DIAGNOSIS — Z95.5 PRESENCE OF CORONARY ANGIOPLASTY IMPLANT AND GRAFT: Chronic | ICD-10-CM

## 2023-07-31 LAB
ALBUMIN SERPL ELPH-MCNC: 3.6 G/DL — SIGNIFICANT CHANGE UP (ref 3.3–5)
ALP SERPL-CCNC: 93 U/L — SIGNIFICANT CHANGE UP (ref 40–120)
ALT FLD-CCNC: 10 U/L — SIGNIFICANT CHANGE UP (ref 10–45)
ANION GAP SERPL CALC-SCNC: 10 MMOL/L — SIGNIFICANT CHANGE UP (ref 5–17)
APTT BLD: 27.1 SEC — SIGNIFICANT CHANGE UP (ref 24.5–35.6)
AST SERPL-CCNC: 15 U/L — SIGNIFICANT CHANGE UP (ref 10–40)
BASOPHILS # BLD AUTO: 0.05 K/UL — SIGNIFICANT CHANGE UP (ref 0–0.2)
BASOPHILS NFR BLD AUTO: 0.4 % — SIGNIFICANT CHANGE UP (ref 0–2)
BILIRUB SERPL-MCNC: 0.3 MG/DL — SIGNIFICANT CHANGE UP (ref 0.2–1.2)
BUN SERPL-MCNC: 30 MG/DL — HIGH (ref 7–23)
CALCIUM SERPL-MCNC: 9 MG/DL — SIGNIFICANT CHANGE UP (ref 8.4–10.5)
CHLORIDE SERPL-SCNC: 101 MMOL/L — SIGNIFICANT CHANGE UP (ref 96–108)
CO2 SERPL-SCNC: 24 MMOL/L — SIGNIFICANT CHANGE UP (ref 22–31)
CREAT SERPL-MCNC: 0.73 MG/DL — SIGNIFICANT CHANGE UP (ref 0.5–1.3)
EGFR: 79 ML/MIN/1.73M2 — SIGNIFICANT CHANGE UP
EOSINOPHIL # BLD AUTO: 0.11 K/UL — SIGNIFICANT CHANGE UP (ref 0–0.5)
EOSINOPHIL NFR BLD AUTO: 0.9 % — SIGNIFICANT CHANGE UP (ref 0–6)
GLUCOSE BLDC GLUCOMTR-MCNC: 362 MG/DL — HIGH (ref 70–99)
GLUCOSE SERPL-MCNC: 201 MG/DL — HIGH (ref 70–99)
HCT VFR BLD CALC: 31.6 % — LOW (ref 34.5–45)
HGB BLD-MCNC: 10.9 G/DL — LOW (ref 11.5–15.5)
IMM GRANULOCYTES NFR BLD AUTO: 0.5 % — SIGNIFICANT CHANGE UP (ref 0–0.9)
INR BLD: 0.88 — SIGNIFICANT CHANGE UP (ref 0.85–1.18)
LYMPHOCYTES # BLD AUTO: 17 % — SIGNIFICANT CHANGE UP (ref 13–44)
LYMPHOCYTES # BLD AUTO: 2.12 K/UL — SIGNIFICANT CHANGE UP (ref 1–3.3)
MCHC RBC-ENTMCNC: 32.3 PG — SIGNIFICANT CHANGE UP (ref 27–34)
MCHC RBC-ENTMCNC: 34.5 GM/DL — SIGNIFICANT CHANGE UP (ref 32–36)
MCV RBC AUTO: 93.8 FL — SIGNIFICANT CHANGE UP (ref 80–100)
MONOCYTES # BLD AUTO: 0.59 K/UL — SIGNIFICANT CHANGE UP (ref 0–0.9)
MONOCYTES NFR BLD AUTO: 4.7 % — SIGNIFICANT CHANGE UP (ref 2–14)
NEUTROPHILS # BLD AUTO: 9.57 K/UL — HIGH (ref 1.8–7.4)
NEUTROPHILS NFR BLD AUTO: 76.5 % — SIGNIFICANT CHANGE UP (ref 43–77)
NRBC # BLD: 0 /100 WBCS — SIGNIFICANT CHANGE UP (ref 0–0)
PLATELET # BLD AUTO: 348 K/UL — SIGNIFICANT CHANGE UP (ref 150–400)
POTASSIUM SERPL-MCNC: 4.8 MMOL/L — SIGNIFICANT CHANGE UP (ref 3.5–5.3)
POTASSIUM SERPL-SCNC: 4.8 MMOL/L — SIGNIFICANT CHANGE UP (ref 3.5–5.3)
PROT SERPL-MCNC: 7.3 G/DL — SIGNIFICANT CHANGE UP (ref 6–8.3)
PROTHROM AB SERPL-ACNC: 10.1 SEC — SIGNIFICANT CHANGE UP (ref 9.5–13)
RBC # BLD: 3.37 M/UL — LOW (ref 3.8–5.2)
RBC # FLD: 13.2 % — SIGNIFICANT CHANGE UP (ref 10.3–14.5)
SODIUM SERPL-SCNC: 135 MMOL/L — SIGNIFICANT CHANGE UP (ref 135–145)
TROPONIN T, HIGH SENSITIVITY RESULT: 17 NG/L — SIGNIFICANT CHANGE UP (ref 0–51)
TROPONIN T, HIGH SENSITIVITY RESULT: 26 NG/L — SIGNIFICANT CHANGE UP (ref 0–51)
WBC # BLD: 12.5 K/UL — HIGH (ref 3.8–10.5)
WBC # FLD AUTO: 12.5 K/UL — HIGH (ref 3.8–10.5)

## 2023-07-31 PROCEDURE — 70450 CT HEAD/BRAIN W/O DYE: CPT | Mod: 26,MA

## 2023-07-31 PROCEDURE — 99285 EMERGENCY DEPT VISIT HI MDM: CPT

## 2023-07-31 PROCEDURE — 71045 X-RAY EXAM CHEST 1 VIEW: CPT | Mod: 26

## 2023-07-31 RX ORDER — CLOPIDOGREL BISULFATE 75 MG/1
75 TABLET, FILM COATED ORAL DAILY
Refills: 0 | Status: DISCONTINUED | OUTPATIENT
Start: 2023-08-01 | End: 2023-08-02

## 2023-07-31 RX ORDER — METOPROLOL TARTRATE 50 MG
25 TABLET ORAL DAILY
Refills: 0 | Status: DISCONTINUED | OUTPATIENT
Start: 2023-07-31 | End: 2023-08-02

## 2023-07-31 RX ORDER — ASPIRIN/CALCIUM CARB/MAGNESIUM 324 MG
81 TABLET ORAL DAILY
Refills: 0 | Status: DISCONTINUED | OUTPATIENT
Start: 2023-07-31 | End: 2023-08-02

## 2023-07-31 RX ORDER — FERROUS SULFATE 325(65) MG
325 TABLET ORAL DAILY
Refills: 0 | Status: DISCONTINUED | OUTPATIENT
Start: 2023-07-31 | End: 2023-08-02

## 2023-07-31 RX ORDER — LEVOTHYROXINE SODIUM 125 MCG
75 TABLET ORAL DAILY
Refills: 0 | Status: DISCONTINUED | OUTPATIENT
Start: 2023-07-31 | End: 2023-08-02

## 2023-07-31 RX ORDER — PANTOPRAZOLE SODIUM 20 MG/1
40 TABLET, DELAYED RELEASE ORAL
Refills: 0 | Status: DISCONTINUED | OUTPATIENT
Start: 2023-07-31 | End: 2023-08-02

## 2023-07-31 RX ORDER — SODIUM CHLORIDE 9 MG/ML
500 INJECTION, SOLUTION INTRAVENOUS
Refills: 0 | Status: DISCONTINUED | OUTPATIENT
Start: 2023-07-31 | End: 2023-08-02

## 2023-07-31 RX ORDER — ISOSORBIDE MONONITRATE 60 MG/1
30 TABLET, EXTENDED RELEASE ORAL DAILY
Refills: 0 | Status: DISCONTINUED | OUTPATIENT
Start: 2023-07-31 | End: 2023-08-01

## 2023-07-31 RX ORDER — ATORVASTATIN CALCIUM 80 MG/1
80 TABLET, FILM COATED ORAL AT BEDTIME
Refills: 0 | Status: DISCONTINUED | OUTPATIENT
Start: 2023-07-31 | End: 2023-08-02

## 2023-07-31 RX ORDER — LOSARTAN POTASSIUM 100 MG/1
50 TABLET, FILM COATED ORAL DAILY
Refills: 0 | Status: DISCONTINUED | OUTPATIENT
Start: 2023-07-31 | End: 2023-08-02

## 2023-07-31 RX ADMIN — Medication 25 MILLIGRAM(S): at 23:28

## 2023-07-31 RX ADMIN — SODIUM CHLORIDE 500 MILLILITER(S): 9 INJECTION, SOLUTION INTRAVENOUS at 16:37

## 2023-07-31 RX ADMIN — ATORVASTATIN CALCIUM 80 MILLIGRAM(S): 80 TABLET, FILM COATED ORAL at 23:28

## 2023-07-31 RX ADMIN — SODIUM CHLORIDE 500 MILLILITER(S): 9 INJECTION, SOLUTION INTRAVENOUS at 17:08

## 2023-07-31 NOTE — H&P ADULT - NSICDXPASTMEDICALHX_GEN_ALL_CORE_FT
PAST MEDICAL HISTORY:  Abdominal wall hematoma     CAD (coronary artery disease) s/p stent to LAD Jan 2022    HLD (hyperlipidemia)     HTN (hypertension)     Hypothyroidism     Insulin dependent type 2 diabetes mellitus     PAD (peripheral artery disease)     S/P angiogram of extremity     Urinary retention

## 2023-07-31 NOTE — H&P ADULT - NSHPREVIEWOFSYSTEMS_GEN_ALL_CORE
GENERAL, CONSTITUTIONAL : denies recent weight loss, fever, chills  EYES, VISION: denies changes in vision   EARS, NOSE, THROAT: denies hearing loss  HEART, CARDIOVASCULAR: + syncope. Denies chest pain, arrhythmia, palpitations, SOB,  LE edema, claudication  RESPIRATORY: Denies cough, SOB, wheezing, PND, orthopnea  GASTROINTESTINAL: Denies abdominal pain, heartburn, bloody stool, dark tarry stool  GENITOURINARY: Denies frequent urination, urgency  MUSCULOSKELETAL denies joint pain or swelling, restricted motion, musculoskeletal pain.   SKIN & INTEGUMENTARY: Denies rashes, sores, blisters, blisters, growths.  NEUROLOGICAL: Denies numbness or tingling sensations, sensation loss, burning.   PSYCHIATRIC: Denies nervousness, anxiety, depression  ENDOCRINE Denies heat or cold intolerance, excessive thirst  HEMATOLOGIC/LYMPHATIC: Denies abnormal bleeding, bleeding of any kind

## 2023-07-31 NOTE — ED ADULT NURSE NOTE - NSFALLHARMRISKINTERV_ED_ALL_ED

## 2023-07-31 NOTE — ED PROVIDER NOTE - CLINICAL SUMMARY MEDICAL DECISION MAKING FREE TEXT BOX
87 y/o with extensive history and s/p balloon stenting 2 weeks ago here after syncopal episode. At this time, pt has returned to baseline. Soft BP in ED but pt is very small and 40 kilos so unclear if this is baseline. No neurologic deficits on exam. Plan for labs, EKG, CXR, fluids, and reassess. 87 y/o with extensive history and s/p balloon and stenting 2 weeks ago here after syncopal episode. At this time, pt has returned to baseline. Soft BP in ED but pt is very small and 40 kilos so unclear if this is baseline. No neurologic deficits on exam. Plan for labs, EKG, CXR, fluids, and reassess.    Pt remained at baseline while in the ED. No prior echo documented in pt chart, pt family unsure when she last had echo but says it would have been here. Will admit to cards for syncope workup

## 2023-07-31 NOTE — H&P ADULT - NSHPADDITIONALINFOADULT_GEN_ALL_CORE
Attending Attestation:  I was physically present for the key portions of the evaluation and management (E/M) service provided.  I agree with the above history, physical, and plan which I have reviewed with the following edits/addendum:    87F w CAD s/p PCI, DMT2, HTN, PAD, hx of UTIs who p/w unresponsiveness. Per dtr who provided collateral hx, pt was drinking coffee around noon yesterday on dining table. Dtr found pt slumped on the table and was hard to awaken, unclear if pt was unconscious, was reportedly breathing and was transferred to the couch where she was able to given OJ via a spoon. Glucose was checked and was > 100. No CP, palps, dyspnea, prodrome prior to event. Has had fluctuating BPs while in hospital. No events on tele. Pt unable to recall circumstances surrounding event. Complaining of RLE pain.  Initial labs: hgb 9.3 (9-10 prior), crea 0.7, TSH 6.9, A1C 10.2  ECG non-ischemic, narrow QRS without signs of conduction dse, similar to prior ECGs  CXR no congestion. CT head no acute changes.  TTE 5/2023: LVEF 49% gr 1 dd, moderate biatrial enlargement, aneurysmal IAS  LHC 7/20/23: 2V CAD, mid LAD stent 75% ISR, prox OM1 75%, RCA stent patent, 20% prox stenosis; S/P mLAD and OM1 intervention  On exam, access site on R groin unremarkable, without hematoma. Euvolemic, without neuro deficits.    #Syncope  - recent TTE w mildly reduced LV sys fxn  - no events on tele  - d/w dtr and pt likelihood of no final diagnosis this admission, may need outpt tele monitoring    #CAD s/p recent PCI  - no new ischemia on ECG  - continue DAPT, high intensity statin    #mild LV dysfxn  - GDMT: losartan 50 mg, MS 25 mg, Imdur 30 mg  - start Farxiga 10 mg    Edgardo Lancaster MD  Cardiology    50 minutes spent on total encounter; more than 50% of the visit was spent counseling and/or coordinating care by the attending physician.     Initial encounter 8/1    [ via phone offered, pt declined and daughter/dtr at bedside translated]

## 2023-07-31 NOTE — ED PROVIDER NOTE - OBJECTIVE STATEMENT
89 y/o  with PMHx CAD s/p stent placement here on 7/15, DM, HTN, HLD, and balance disorder (walks with a walker) presents to ED s/p syncopal episode. Per daughter, pt woke at 11am and said she wanted coffee. She was sitting at the table drinking coffee when she was noted to have syncopized. Daughter reports pt eyes were closed with her head down on the table and was not responding. Daughter notes at one point pt's lips appeared to be purplish. Pt was carried to the couch and took approximately 30 minutes to fully return back to baseline. Denies any chest pain, difficulty breathing abdominal pain, vomiting, or diarrhea. Pt only endorses feeling tired. 87 y/o  with PMHx CAD s/p stent placement and ballooning here on 7/15, DM, HTN, HLD, and balance disorder (walks with a walker) presents to ED s/p syncopal episode. Per daughter, pt woke at 11am and said she wanted coffee. She was sitting at the table drinking coffee when she was noted to have syncopized. No prodrome. Daughter reports pt eyes were closed with her head down on the table and was not responding. Daughter notes at one point pt's lips appeared to be purplish. Pt was carried to the couch and took approximately 30 minutes to fully return back to baseline. Denies any chest pain, difficulty breathing abdominal pain, vomiting, or diarrhea. Pt only endorses feeling tired. No prior syncopal episodes.

## 2023-07-31 NOTE — ED ADULT TRIAGE NOTE - CHIEF COMPLAINT QUOTE
Patient to the ED with family member c/o episode of unresponsiveness and "purple lips" earlier today, now resolved. Hx of balloon and cardiac stents x 2 weeks ago. Now c/o generalized weakness. AAOX4, NAD.

## 2023-07-31 NOTE — H&P ADULT - PROBLEM SELECTOR PLAN 3
Hypotensive with BP 96/61 on admission   upon arrival to the floor  Continue Toprol, Imdur and Losartan (On Temilsartan at home)--with parameter

## 2023-07-31 NOTE — H&P ADULT - ASSESSMENT
88y/o female, HTN, HPL, DM-II, Hypothyroidism, known CAD s/p recent PCI of OM1 and mLAD on 7/18/23--on Aspirin and Plavix, presented to the ER s/p syncope. CTH negative, no EKG change, labs with WBC 12.5

## 2023-07-31 NOTE — ED PROVIDER NOTE - PHYSICAL EXAMINATION
CONST: nontoxic NAD speaking in full sentences  HEAD: atraumatic  EYES: conjunctivae clear, PERRL, EOMI  ENT: mmm  NECK: supple/FROM  CARD: rrr  CHEST: no stridor/retractions/tripoding  ABD: soft, nd, nttp, no rebound/guarding  EXT: FROM, symmetric distal pulses intact  SKIN: warm, dry, no rash  NEURO: a+ox3, no facial asymmetry, no aphasia, PERRL, EOMI, no neglect, CN II-XII intact, ftn wnl, neg pronator, 5/5 strength x4, gross sensation intact x4, normal gait CONST: nontoxic NAD speaking in full sentences mild tired appearing  HEAD: atraumatic  EYES: conjunctivae clear, PERRL, EOMI  NECK: supple  CARD: rrr  CHEST: CTABL, no stridor/retractions/tripoding  ABD: soft, nd, nttp, no rebound/guarding  EXT: FROM, symmetric distal pulses intact  SKIN: warm, dry, no rash  NEURO: a+ox3, no facial asymmetry, no aphasia, PERRL, EOMI, no neglect, CN II-XII intact, ftn wnl, neg pronator, equal strength and sensation, baseline gait

## 2023-07-31 NOTE — H&P ADULT - NSHPPHYSICALEXAM_GEN_ALL_CORE
T(C): 36.6 (07-31-23 @ 18:52), Max: 36.6 (07-31-23 @ 14:23)  HR: 84 (07-31-23 @ 18:52) (71 - 84)  BP: 145/78 (07-31-23 @ 18:52) (96/61 - 145/78)  RR: 17 (07-31-23 @ 18:52) (16 - 17)  SpO2: 99% (07-31-23 @ 18:52) (98% - 99%)      Appearance: Normal	  HEENT:   Normal oral mucosa, PERRL, EOMI	  Neck: Supple,  - JVD; No Carotid Bruit and 2+ pulses B/L  Cardiovascular: Normal S1 S2, No JVD, No murmurs  Respiratory: Lungs clear to auscultation, no Rales, Rhonchi, Wheezing	  Gastrointestinal:  Soft, Non-tender, + BS	  Skin: No rashes, No ecchymoses, No cyanosis  Extremities: Normal range of motion, No clubbing, cyanosis or edema  Vascular: Femoral pulses 2+ b/l without bruit, DP 1+ b/l, PT 1+ b/l  Neurologic: Non-focal  Psychiatry: A & O x 3, Mood & affect appropriate

## 2023-07-31 NOTE — ED ADULT NURSE NOTE - OBJECTIVE STATEMENT
89 y/o F with pmhx DM and x2 cardiac stents placed 2 weeks ago presents to the ED c/o unresponsiveness x30min just PTA. Per daughter, pt had taken her medications on an empty stomach then slumped down resting her head on the table and not responding to stimuli x30 min with purple lips. When pt awoke, daughter drove her here. Daughter states pt now feels weak and fatigued. Pt wears a continuous glucometer to LUE and daughter denies BG falling below 145. Denies seizure like activity, vomiting, fever, h/o similar sx, and any other complaints at this time. On exam, pt awake, daughter answering questions for pt. Pt placed on cardiac monitor NSR 70-80's. 100% on RA. BP in triage 96/61. No edema. Respirations even and unlabored.

## 2023-07-31 NOTE — H&P ADULT - NSHPLABSRESULTS_GEN_ALL_CORE
10.9   12.50 )-----------( 348      ( 31 Jul 2023 15:33 )             31.6       07-31    135  |  101  |  30<H>  ----------------------------<  201<H>  4.8   |  24  |  0.73    Ca    9.0      31 Jul 2023 15:33    TPro  7.3  /  Alb  3.6  /  TBili  0.3  /  DBili  x   /  AST  15  /  ALT  10  /  AlkPhos  93  07-31      PT/INR - ( 31 Jul 2023 15:33 )   PT: 10.1 sec;   INR: 0.88          PTT - ( 31 Jul 2023 15:33 )  PTT:27.1 sec      Urinalysis Basic - ( 31 Jul 2023 15:33 )    Color: x / Appearance: x / SG: x / pH: x  Gluc: 201 mg/dL / Ketone: x  / Bili: x / Urobili: x   Blood: x / Protein: x / Nitrite: x   Leuk Esterase: x / RBC: x / WBC x   Sq Epi: x / Non Sq Epi: x / Bacteria: x

## 2023-07-31 NOTE — H&P ADULT - HISTORY OF PRESENT ILLNESS
This is a 87 year old Hungarian speaking female, [place], PMHx CAD (s/p PCI LAD 1/28/23, OM1, PTCA mLAD), IDDM2, HTN, HLD, Hypothyroidism, Balance disorder, PAD s/p ? prior intervention, Urinary Retention c/b UTIs, recent PCI of OM1, PTCA mLAD on 7/18, presented to the ER with syncope. Per pt's daughter, pt was drinking coffee at 12AM when she slumped over on the table. Lips became bluish, hand cold. She denies any prodrome, chest pain, SOB, seizure like activity, urine and bowel incontinence  In the ER, BP 96/61, 71, R 16, 97.9, EKG non ischemic, lab significant for WBC 12.5, H/H 10.9/31.6, trop 26-->17, CTH without acute event.   She is admitted for further evaluation and monitoring

## 2023-08-01 LAB
A1C WITH ESTIMATED AVERAGE GLUCOSE RESULT: 10.2 % — HIGH (ref 4–5.6)
ANION GAP SERPL CALC-SCNC: 9 MMOL/L — SIGNIFICANT CHANGE UP (ref 5–17)
BASOPHILS # BLD AUTO: 0.04 K/UL — SIGNIFICANT CHANGE UP (ref 0–0.2)
BASOPHILS NFR BLD AUTO: 0.5 % — SIGNIFICANT CHANGE UP (ref 0–2)
BUN SERPL-MCNC: 26 MG/DL — HIGH (ref 7–23)
CALCIUM SERPL-MCNC: 8.2 MG/DL — LOW (ref 8.4–10.5)
CHLORIDE SERPL-SCNC: 106 MMOL/L — SIGNIFICANT CHANGE UP (ref 96–108)
CHOLEST SERPL-MCNC: 152 MG/DL — SIGNIFICANT CHANGE UP
CO2 SERPL-SCNC: 20 MMOL/L — LOW (ref 22–31)
CREAT SERPL-MCNC: 0.69 MG/DL — SIGNIFICANT CHANGE UP (ref 0.5–1.3)
EGFR: 83 ML/MIN/1.73M2 — SIGNIFICANT CHANGE UP
EOSINOPHIL # BLD AUTO: 0.13 K/UL — SIGNIFICANT CHANGE UP (ref 0–0.5)
EOSINOPHIL NFR BLD AUTO: 1.6 % — SIGNIFICANT CHANGE UP (ref 0–6)
ESTIMATED AVERAGE GLUCOSE: 246 MG/DL — HIGH (ref 68–114)
FERRITIN SERPL-MCNC: 69 NG/ML — SIGNIFICANT CHANGE UP (ref 13–330)
GLUCOSE BLDC GLUCOMTR-MCNC: 103 MG/DL — HIGH (ref 70–99)
GLUCOSE BLDC GLUCOMTR-MCNC: 164 MG/DL — HIGH (ref 70–99)
GLUCOSE BLDC GLUCOMTR-MCNC: 168 MG/DL — HIGH (ref 70–99)
GLUCOSE BLDC GLUCOMTR-MCNC: 80 MG/DL — SIGNIFICANT CHANGE UP (ref 70–99)
GLUCOSE SERPL-MCNC: 190 MG/DL — HIGH (ref 70–99)
HCT VFR BLD CALC: 27.8 % — LOW (ref 34.5–45)
HDLC SERPL-MCNC: 53 MG/DL — SIGNIFICANT CHANGE UP
HGB BLD-MCNC: 9.3 G/DL — LOW (ref 11.5–15.5)
IMM GRANULOCYTES NFR BLD AUTO: 0.6 % — SIGNIFICANT CHANGE UP (ref 0–0.9)
IRON SATN MFR SERPL: 26 % — SIGNIFICANT CHANGE UP (ref 14–50)
IRON SATN MFR SERPL: 63 UG/DL — SIGNIFICANT CHANGE UP (ref 30–160)
LIPID PNL WITH DIRECT LDL SERPL: 68 MG/DL — SIGNIFICANT CHANGE UP
LYMPHOCYTES # BLD AUTO: 1.9 K/UL — SIGNIFICANT CHANGE UP (ref 1–3.3)
LYMPHOCYTES # BLD AUTO: 22.9 % — SIGNIFICANT CHANGE UP (ref 13–44)
MAGNESIUM SERPL-MCNC: 1.8 MG/DL — SIGNIFICANT CHANGE UP (ref 1.6–2.6)
MCHC RBC-ENTMCNC: 32 PG — SIGNIFICANT CHANGE UP (ref 27–34)
MCHC RBC-ENTMCNC: 33.5 GM/DL — SIGNIFICANT CHANGE UP (ref 32–36)
MCV RBC AUTO: 95.5 FL — SIGNIFICANT CHANGE UP (ref 80–100)
MONOCYTES # BLD AUTO: 0.69 K/UL — SIGNIFICANT CHANGE UP (ref 0–0.9)
MONOCYTES NFR BLD AUTO: 8.3 % — SIGNIFICANT CHANGE UP (ref 2–14)
NEUTROPHILS # BLD AUTO: 5.5 K/UL — SIGNIFICANT CHANGE UP (ref 1.8–7.4)
NEUTROPHILS NFR BLD AUTO: 66.1 % — SIGNIFICANT CHANGE UP (ref 43–77)
NON HDL CHOLESTEROL: 99 MG/DL — SIGNIFICANT CHANGE UP
NRBC # BLD: 0 /100 WBCS — SIGNIFICANT CHANGE UP (ref 0–0)
PHOSPHATE SERPL-MCNC: 3.3 MG/DL — SIGNIFICANT CHANGE UP (ref 2.5–4.5)
PLATELET # BLD AUTO: 277 K/UL — SIGNIFICANT CHANGE UP (ref 150–400)
POTASSIUM SERPL-MCNC: 3.9 MMOL/L — SIGNIFICANT CHANGE UP (ref 3.5–5.3)
POTASSIUM SERPL-SCNC: 3.9 MMOL/L — SIGNIFICANT CHANGE UP (ref 3.5–5.3)
RBC # BLD: 2.91 M/UL — LOW (ref 3.8–5.2)
RBC # FLD: 13.4 % — SIGNIFICANT CHANGE UP (ref 10.3–14.5)
SODIUM SERPL-SCNC: 135 MMOL/L — SIGNIFICANT CHANGE UP (ref 135–145)
TIBC SERPL-MCNC: 238 UG/DL — SIGNIFICANT CHANGE UP (ref 220–430)
TRANSFERRIN SERPL-MCNC: 200 MG/DL — SIGNIFICANT CHANGE UP (ref 200–360)
TRIGL SERPL-MCNC: 156 MG/DL — HIGH
TSH SERPL-MCNC: 6.91 UIU/ML — HIGH (ref 0.27–4.2)
UIBC SERPL-MCNC: 175 UG/DL — SIGNIFICANT CHANGE UP (ref 110–370)
WBC # BLD: 8.31 K/UL — SIGNIFICANT CHANGE UP (ref 3.8–10.5)
WBC # FLD AUTO: 8.31 K/UL — SIGNIFICANT CHANGE UP (ref 3.8–10.5)

## 2023-08-01 PROCEDURE — 99222 1ST HOSP IP/OBS MODERATE 55: CPT

## 2023-08-01 RX ORDER — SODIUM CHLORIDE 9 MG/ML
1000 INJECTION, SOLUTION INTRAVENOUS
Refills: 0 | Status: DISCONTINUED | OUTPATIENT
Start: 2023-08-01 | End: 2023-08-02

## 2023-08-01 RX ORDER — ISOSORBIDE MONONITRATE 60 MG/1
30 TABLET, EXTENDED RELEASE ORAL ONCE
Refills: 0 | Status: COMPLETED | OUTPATIENT
Start: 2023-08-01 | End: 2023-08-01

## 2023-08-01 RX ORDER — INSULIN LISPRO 100/ML
3 VIAL (ML) SUBCUTANEOUS
Refills: 0 | Status: DISCONTINUED | OUTPATIENT
Start: 2023-08-01 | End: 2023-08-02

## 2023-08-01 RX ORDER — ACETAMINOPHEN 500 MG
650 TABLET ORAL ONCE
Refills: 0 | Status: COMPLETED | OUTPATIENT
Start: 2023-08-01 | End: 2023-08-01

## 2023-08-01 RX ORDER — DAPAGLIFLOZIN 10 MG/1
1 TABLET, FILM COATED ORAL
Qty: 30 | Refills: 0
Start: 2023-08-01 | End: 2023-08-30

## 2023-08-01 RX ORDER — LOSARTAN POTASSIUM 100 MG/1
50 TABLET, FILM COATED ORAL ONCE
Refills: 0 | Status: COMPLETED | OUTPATIENT
Start: 2023-08-01 | End: 2023-08-01

## 2023-08-01 RX ORDER — DAPAGLIFLOZIN 10 MG/1
10 TABLET, FILM COATED ORAL EVERY 24 HOURS
Refills: 0 | Status: DISCONTINUED | OUTPATIENT
Start: 2023-08-01 | End: 2023-08-02

## 2023-08-01 RX ORDER — INSULIN GLARGINE 100 [IU]/ML
10 INJECTION, SOLUTION SUBCUTANEOUS ONCE
Refills: 0 | Status: COMPLETED | OUTPATIENT
Start: 2023-08-01 | End: 2023-08-01

## 2023-08-01 RX ORDER — ACETAMINOPHEN 500 MG
650 TABLET ORAL EVERY 6 HOURS
Refills: 0 | Status: DISCONTINUED | OUTPATIENT
Start: 2023-08-01 | End: 2023-08-02

## 2023-08-01 RX ORDER — DEXTROSE 50 % IN WATER 50 %
15 SYRINGE (ML) INTRAVENOUS ONCE
Refills: 0 | Status: DISCONTINUED | OUTPATIENT
Start: 2023-08-01 | End: 2023-08-02

## 2023-08-01 RX ORDER — INSULIN GLARGINE 100 [IU]/ML
10 INJECTION, SOLUTION SUBCUTANEOUS AT BEDTIME
Refills: 0 | Status: DISCONTINUED | OUTPATIENT
Start: 2023-08-01 | End: 2023-08-01

## 2023-08-01 RX ORDER — ISOSORBIDE MONONITRATE 60 MG/1
30 TABLET, EXTENDED RELEASE ORAL DAILY
Refills: 0 | Status: DISCONTINUED | OUTPATIENT
Start: 2023-08-01 | End: 2023-08-02

## 2023-08-01 RX ORDER — INSULIN GLARGINE 100 [IU]/ML
10 INJECTION, SOLUTION SUBCUTANEOUS AT BEDTIME
Refills: 0 | Status: DISCONTINUED | OUTPATIENT
Start: 2023-08-01 | End: 2023-08-02

## 2023-08-01 RX ORDER — INSULIN LISPRO 100/ML
VIAL (ML) SUBCUTANEOUS ONCE
Refills: 0 | Status: COMPLETED | OUTPATIENT
Start: 2023-08-01 | End: 2023-08-01

## 2023-08-01 RX ORDER — INSULIN LISPRO 100/ML
VIAL (ML) SUBCUTANEOUS
Refills: 0 | Status: DISCONTINUED | OUTPATIENT
Start: 2023-08-01 | End: 2023-08-02

## 2023-08-01 RX ORDER — GLUCAGON INJECTION, SOLUTION 0.5 MG/.1ML
1 INJECTION, SOLUTION SUBCUTANEOUS ONCE
Refills: 0 | Status: DISCONTINUED | OUTPATIENT
Start: 2023-08-01 | End: 2023-08-02

## 2023-08-01 RX ORDER — DEXTROSE 50 % IN WATER 50 %
25 SYRINGE (ML) INTRAVENOUS ONCE
Refills: 0 | Status: DISCONTINUED | OUTPATIENT
Start: 2023-08-01 | End: 2023-08-02

## 2023-08-01 RX ORDER — ENOXAPARIN SODIUM 100 MG/ML
30 INJECTION SUBCUTANEOUS EVERY 24 HOURS
Refills: 0 | Status: DISCONTINUED | OUTPATIENT
Start: 2023-08-01 | End: 2023-08-02

## 2023-08-01 RX ORDER — INSULIN LISPRO 100/ML
VIAL (ML) SUBCUTANEOUS AT BEDTIME
Refills: 0 | Status: DISCONTINUED | OUTPATIENT
Start: 2023-08-01 | End: 2023-08-02

## 2023-08-01 RX ORDER — DEXTROSE 50 % IN WATER 50 %
12.5 SYRINGE (ML) INTRAVENOUS ONCE
Refills: 0 | Status: DISCONTINUED | OUTPATIENT
Start: 2023-08-01 | End: 2023-08-02

## 2023-08-01 RX ADMIN — Medication 81 MILLIGRAM(S): at 11:58

## 2023-08-01 RX ADMIN — Medication 650 MILLIGRAM(S): at 13:19

## 2023-08-01 RX ADMIN — CLOPIDOGREL BISULFATE 75 MILLIGRAM(S): 75 TABLET, FILM COATED ORAL at 11:58

## 2023-08-01 RX ADMIN — Medication 3 UNIT(S): at 09:22

## 2023-08-01 RX ADMIN — PANTOPRAZOLE SODIUM 40 MILLIGRAM(S): 20 TABLET, DELAYED RELEASE ORAL at 08:00

## 2023-08-01 RX ADMIN — Medication 3 UNIT(S): at 12:38

## 2023-08-01 RX ADMIN — Medication 1: at 12:39

## 2023-08-01 RX ADMIN — Medication 3: at 00:27

## 2023-08-01 RX ADMIN — LOSARTAN POTASSIUM 50 MILLIGRAM(S): 100 TABLET, FILM COATED ORAL at 01:26

## 2023-08-01 RX ADMIN — ISOSORBIDE MONONITRATE 30 MILLIGRAM(S): 60 TABLET, EXTENDED RELEASE ORAL at 21:27

## 2023-08-01 RX ADMIN — Medication 1: at 08:09

## 2023-08-01 RX ADMIN — Medication 75 MICROGRAM(S): at 05:21

## 2023-08-01 RX ADMIN — Medication 25 MILLIGRAM(S): at 13:23

## 2023-08-01 RX ADMIN — ISOSORBIDE MONONITRATE 30 MILLIGRAM(S): 60 TABLET, EXTENDED RELEASE ORAL at 01:30

## 2023-08-01 RX ADMIN — ATORVASTATIN CALCIUM 80 MILLIGRAM(S): 80 TABLET, FILM COATED ORAL at 21:27

## 2023-08-01 RX ADMIN — Medication 325 MILLIGRAM(S): at 11:59

## 2023-08-01 RX ADMIN — ENOXAPARIN SODIUM 30 MILLIGRAM(S): 100 INJECTION SUBCUTANEOUS at 19:28

## 2023-08-01 RX ADMIN — Medication 650 MILLIGRAM(S): at 01:28

## 2023-08-01 RX ADMIN — Medication 650 MILLIGRAM(S): at 02:28

## 2023-08-01 RX ADMIN — INSULIN GLARGINE 10 UNIT(S): 100 INJECTION, SOLUTION SUBCUTANEOUS at 00:49

## 2023-08-01 RX ADMIN — DAPAGLIFLOZIN 10 MILLIGRAM(S): 10 TABLET, FILM COATED ORAL at 18:35

## 2023-08-01 RX ADMIN — Medication 650 MILLIGRAM(S): at 12:10

## 2023-08-01 NOTE — PROGRESS NOTE ADULT - PROBLEM SELECTOR PLAN 3
Hypotensive with BP 96/61 on admission   upon arrival to the floor  Continue Toprol, Imdur and Losartan (On Temilsartan at home)--with parameter Hypotensive with BP 96/61 on admission  SBP dang to 200 upon arrival to the floor  -BP has remained stable during the day 8/1/23   Continue Toprol, Imdur and Losartan (On Temilsartan at home)--with parameters   -to avoid significant changes in BP, space out BP medications with Losartan in AM and Imdur scheduled for PM

## 2023-08-01 NOTE — DIETITIAN INITIAL EVALUATION ADULT - PERTINENT MEDS FT
MEDICATIONS  (STANDING):  aspirin enteric coated 81 milliGRAM(s) Oral daily  atorvastatin 80 milliGRAM(s) Oral at bedtime  clopidogrel Tablet 75 milliGRAM(s) Oral daily  dextrose 5%. 1000 milliLiter(s) (50 mL/Hr) IV Continuous <Continuous>  dextrose 5%. 1000 milliLiter(s) (100 mL/Hr) IV Continuous <Continuous>  dextrose 50% Injectable 25 Gram(s) IV Push once  dextrose 50% Injectable 12.5 Gram(s) IV Push once  dextrose 50% Injectable 25 Gram(s) IV Push once  ferrous    sulfate 325 milliGRAM(s) Oral daily  glucagon  Injectable 1 milliGRAM(s) IntraMuscular once  insulin glargine Injectable (LANTUS) 10 Unit(s) SubCutaneous at bedtime  insulin lispro (ADMELOG) corrective regimen sliding scale   SubCutaneous three times a day before meals  insulin lispro (ADMELOG) corrective regimen sliding scale   SubCutaneous at bedtime  insulin lispro Injectable (ADMELOG) 3 Unit(s) SubCutaneous three times a day before meals  isosorbide   mononitrate ER Tablet (IMDUR) 30 milliGRAM(s) Oral daily  lactated ringers. 500 milliLiter(s) (500 mL/Hr) IV Continuous <Continuous>  levothyroxine 75 MICROGram(s) Oral daily  losartan 50 milliGRAM(s) Oral daily  metoprolol succinate ER 25 milliGRAM(s) Oral daily  pantoprazole    Tablet 40 milliGRAM(s) Oral before breakfast    MEDICATIONS  (PRN):  dextrose Oral Gel 15 Gram(s) Oral once PRN Blood Glucose LESS THAN 70 milliGRAM(s)/deciliter

## 2023-08-01 NOTE — PROGRESS NOTE ADULT - ASSESSMENT
87 year old Pashto speaking female, [place], PMHx CAD (s/p PCI LAD 1/28/23, OM1, PTCA mLAD), IDDM2, HTN, HLD, Hypothyroidism, Balance disorder, PAD w/ prior intervention, Urinary Retention c/b UTIs, recent PCI of OM1, PTCA mLAD on 7/18, presented to [facility] ED 7/31/23 with c/o syncope. Per pt's daughter, pt was drinking coffee at 12PM when she slumped over on the table, lips became bluish and hands were cold. Daughter reports the patient was breathing during the entire episode. She denies any prodrome, chest pain, SOB, seizure like activity, urine and bowel incontinence. EKG non ischemic, lab significant for WBC 12.5 (repeats wnl) H/H 10.9/31.6, trop 26-->17, CTH without acute event. Patient is now admitted to cardiac tele unit for further evaluation and monitoring.

## 2023-08-01 NOTE — PROGRESS NOTE ADULT - NSPROGADDITIONALINFOA_GEN_ALL_CORE
Attending Attestation:  I was physically present for the key portions of the evaluation and management (E/M) service provided.  I agree with the above history, physical, and plan which I have reviewed.  Edgardo Lancaster MD (Cardiology)  Initial encounter 8/1. Please see HnP attestation.

## 2023-08-01 NOTE — PROGRESS NOTE ADULT - PROBLEM SELECTOR PLAN 6
Continue Synthroid 75mcg daily  Check TSH in AM Continue Synthroid 75mcg daily  -TSH: 6.910 Continue Synthroid 75mcg daily  -TSH: 6.910    F: None  E: Replete if K<4 or Mag<2  N: DASH Diet  VTEppx: Lovenox   Dispo: cardiac tele-- patient will need shower chair for discharge.  PT:     Case discussed with Dr. Lancaster

## 2023-08-01 NOTE — PHYSICAL THERAPY INITIAL EVALUATION ADULT - ADDITIONAL COMMENTS
Patient lives in an elevator building with her daughters. Ambulated with a rollatgor PTA. Also has a w/c. Independent in the home but needs assist for outdoor ambulation.

## 2023-08-01 NOTE — DIETITIAN INITIAL EVALUATION ADULT - NSFNSGIASSESSMENTFT_GEN_A_CORE
No issues with nausea, vomiting, diarrhea, constipation reported at time of visit. Pt is not one any bowel regimen. Last BM noted 7/31 per pt and confirmed by flow sheets.

## 2023-08-01 NOTE — PATIENT PROFILE ADULT - FALL HARM RISK - FALLEN IN PAST
syncope/Unanticipated physiological fall syncope, also h/o accidental falls/Unanticipated physiological fall

## 2023-08-01 NOTE — PATIENT PROFILE ADULT - ABILITY TO HEAR (WITH HEARING AID OR HEARING APPLIANCE IF NORMALLY USED):
Adequate: hears normal conversation without difficulty Patient requests all Lab and Radiology Results on their Discharge Instructions

## 2023-08-01 NOTE — PHYSICAL THERAPY INITIAL EVALUATION ADULT - LEVEL OF INDEPENDENCE: SIT/STAND, REHAB EVAL
Show Applicator Variable?: Yes minimum assist (75% patients effort) Render Note In Bullet Format When Appropriate: No Duration Of Freeze Thaw-Cycle (Seconds): 2 Post-Care Instructions: I reviewed with the patient in detail post-care instructions. Patient is to wear sunprotection, and avoid picking at any of the treated lesions. Pt may apply Vaseline to crusted or scabbing areas. Consent: The patient's consent was obtained including but not limited to risks of crusting, scabbing, blistering, scarring, darker or lighter pigmentary change, recurrence, incomplete removal and infection. Detail Level: Detailed

## 2023-08-01 NOTE — PATIENT PROFILE ADULT - FALL HARM RISK - FACTORS
recent fall/Impaired gait/IV and/or equipment tethered to patient/Syncope/Weakness syncope, LOC per daughter/Impaired gait/IV and/or equipment tethered to patient/Syncope/Weakness

## 2023-08-01 NOTE — DIETITIAN INITIAL EVALUATION ADULT - OTHER INFO
Labs 8/1: Ca: 8.2 (L), Glucose: 190 (H), Triglycerides: 156 (H), POCT ranges in the past 24 hours: 154-362, A1C: 10.2%. Indicates suboptimal glycemic control at home. Daughter checks pt's blood sugars using continuous glucose monitor, reports avg numbers at home are between 100-130

## 2023-08-01 NOTE — PROGRESS NOTE ADULT - SUBJECTIVE AND OBJECTIVE BOX
Interventional Cardiology PA Adult Progress Note    Subjective Assessment: PT seen and examined at bedside this morning with daughter present translating in Fijian. Patient reports R groin pain and leg pain since her cath at [facility] 7/18/23. Site was examined and incision site healing well with no hematoma, ecchymosis or active infection noted on exam. Patient chest pain, SOB, BARRON, palpitations, dizziness, LOC, N/V/D, fever/chills/sick contact, diaphoresis, orthopnea/PND, and leg swelling.    ROS negative except as noted above.  	  MEDICATIONS:  isosorbide   mononitrate ER Tablet (IMDUR) 30 milliGRAM(s) Oral daily  losartan 50 milliGRAM(s) Oral daily  metoprolol succinate ER 25 milliGRAM(s) Oral daily  acetaminophen     Tablet .. 650 milliGRAM(s) Oral every 6 hours PRN  pantoprazole    Tablet 40 milliGRAM(s) Oral before breakfast  atorvastatin 80 milliGRAM(s) Oral at bedtime  dapagliflozin 10 milliGRAM(s) Oral every 24 hours  dextrose 50% Injectable 25 Gram(s) IV Push once  dextrose 50% Injectable 12.5 Gram(s) IV Push once  dextrose 50% Injectable 25 Gram(s) IV Push once  dextrose Oral Gel 15 Gram(s) Oral once PRN  glucagon  Injectable 1 milliGRAM(s) IntraMuscular once  insulin glargine Injectable (LANTUS) 10 Unit(s) SubCutaneous at bedtime  insulin lispro (ADMELOG) corrective regimen sliding scale   SubCutaneous three times a day before meals  insulin lispro (ADMELOG) corrective regimen sliding scale   SubCutaneous at bedtime  insulin lispro Injectable (ADMELOG) 3 Unit(s) SubCutaneous three times a day before meals  levothyroxine 75 MICROGram(s) Oral daily  aspirin enteric coated 81 milliGRAM(s) Oral daily  clopidogrel Tablet 75 milliGRAM(s) Oral daily  dextrose 5%. 1000 milliLiter(s) IV Continuous <Continuous>  dextrose 5%. 1000 milliLiter(s) IV Continuous <Continuous>  ferrous    sulfate 325 milliGRAM(s) Oral daily   lactated ringers. 500 milliLiter(s) IV Continuous <Continuous>      	    [PHYSICAL EXAM:  TELEMETRY:  T(C): 36.8 (08-01-23 @ 17:45), Max: 36.8 (08-01-23 @ 17:45)  HR: 75 (08-01-23 @ 17:45) (65 - 89)  BP: 153/70 (08-01-23 @ 17:45) (104/50 - 208/92)  RR: 17 (08-01-23 @ 17:45) (17 - 18)  SpO2: 100% (08-01-23 @ 17:45) (96% - 100%)  Wt(kg): --  I&O's Summary    31 Jul 2023 07:01  -  01 Aug 2023 07:00  --------------------------------------------------------  IN: 340 mL / OUT: 0 mL / NET: 340 mL    01 Aug 2023 07:01  -  01 Aug 2023 18:24  --------------------------------------------------------  IN: 180 mL / OUT: 0 mL / NET: 180 mL      Height (cm): 142.2 (07-31 @ 22:35)                                        Appearance: Normal	  HEENT:   Normal oral mucosa, PERRLA, EOMI	  Neck: Supple,  - JVD; Carotid Bruit   Cardiovascular: Normal S1 S2, No JVD, No murmurs,   Respiratory: Lungs clear to auscultation, No Rales, Rhonchi, Wheezing	  Gastrointestinal:  Soft, Non-tender, + BS	  Skin: No rashes, No ecchymoses, No cyanosis  Extremities: Normal range of motion, No clubbing, cyanosis or edema  Vascular: Peripheral pulses palpable 2+ bilaterally  Neurologic: Non-focal  Psychiatry: A & O x 3, Mood & affect appropriate      	    ECG:  	  RADIOLOGY:   DIAGNOSTIC TESTING:  [ ] Echocardiogram:   [ ]  Catheterization:  [ ] Stress Test:    [ ] MARCOS  OTHER: 	    LABS:	 	  CARDIAC MARKERS:                                  9.3    8.31  )-----------( 277      ( 01 Aug 2023 05:30 )             27.8     08-01    135  |  106  |  26<H>  ----------------------------<  190<H>  3.9   |  20<L>  |  0.69    Ca    8.2<L>      01 Aug 2023 05:30  Phos  3.3     08-01  Mg     1.8     08-01    TPro  7.3  /  Alb  3.6  /  TBili  0.3  /  DBili  x   /  AST  15  /  ALT  10  /  AlkPhos  93  07-31    proBNP:   Lipid Profile:   HgA1c:   TSH: Thyroid Stimulating Hormone, Serum: 6.910 uIU/mL (08-01 @ 05:30)    PT/INR - ( 31 Jul 2023 15:33 )   PT: 10.1 sec;   INR: 0.88          PTT - ( 31 Jul 2023 15:33 )  PTT:27.1 sec

## 2023-08-01 NOTE — DIETITIAN NUTRITION RISK NOTIFICATION - ADDITIONAL COMMENTS/DIETITIAN RECOMMENDATIONS
1. Continue current diet as tolerated: Consistent Carbohydrate w/ No Evening Snack. Diet texture/fluid consistencies based on SLP recommendations.   2. Recommend oral nutrition supplement: Ensure Max 3x a day (150 kcal, 30 g protein in each) to optimize intake.   3. Continue micronutrient supplementation: Ferrous Sulfate >>Consider adding multivitamin supplementation unless otherwise contraindicated.   4. Encourage PO intake and honor food preferences as able unless otherwise contraindicated.   5. Monitor PO intake, diet tolerance, weight trends, labs, and skin integrity and bowel movement regularity.   6. RD remains available upon request and will follow-up per protocol.

## 2023-08-01 NOTE — DIETITIAN INITIAL EVALUATION ADULT - PERTINENT LABORATORY DATA
08-01    135  |  106  |  26<H>  ----------------------------<  190<H>  3.9   |  20<L>  |  0.69    Ca    8.2<L>      01 Aug 2023 05:30  Phos  3.3     08-01  Mg     1.8     08-01    TPro  7.3  /  Alb  3.6  /  TBili  0.3  /  DBili  x   /  AST  15  /  ALT  10  /  AlkPhos  93  07-31  POCT Blood Glucose.: 164 mg/dL (08-01-23 @ 08:03)  A1C with Estimated Average Glucose Result: 10.2 % (08-01-23 @ 05:30)  A1C with Estimated Average Glucose Result: 9.9 % (07-14-23 @ 07:56)  A1C with Estimated Average Glucose Result: 11.6 % (11-21-22 @ 08:43)

## 2023-08-01 NOTE — DIETITIAN INITIAL EVALUATION ADULT - REASON FOR ADMISSION
"This is a 87 year old Japanese speaking female, [place] PMHx CAD (s/p PCI LAD 1/28/23, OM1, PTCA mLAD), IDDM2, HTN, HLD, Hypothyroidism, Balance disorder, PAD s/p ? prior intervention, Urinary Retention c/b UTIs, recent PCI of OM1, PTCA mLAD on 7/18, presented to the ER with syncope. Per pt's daughter, pt was drinking coffee at 12AM when she slumped over on the table. Lips became bluish, hand cold. She denies any prodrome, chest pain, SOB, seizure like activity, urine and bowel incontinence  In the ER, BP 96/61, 71, R 16, 97.9, EKG non ischemic, lab significant for WBC 12.5, H/H 10.9/31.6, trop 26-->17, CTH without acute event.   She is admitted for further evaluation and monitoring"

## 2023-08-01 NOTE — PATIENT PROFILE ADULT - FALL HARM RISK - HARM RISK INTERVENTIONS

## 2023-08-01 NOTE — DIETITIAN INITIAL EVALUATION ADULT - ENERGY INTAKE
Pt is tolerating current diet: Consistent Carbohydrate w/ No Evening Snack. Pt consumed 50-75% of breakfast including pancakes.  Fair (50-75%)

## 2023-08-01 NOTE — PROGRESS NOTE ADULT - PROBLEM SELECTOR PLAN 2
Recent MARTINA of OM1, PTCA mLAD  Continue Aspirin, Plavix, Imdur -patient with known h/o CAD with h/o PCI   s/p [facility] 7/20/23: 2V CAD, mid LAD stent 75% ISR, prox OM1 75%, RCA stent patent, 20% prox stenosis; S/P mLAD and OM1 intervention  -On exam, access site on R groin unremarkable, without hematoma.   -chest pain free   -no new ischemia on ECG  - continue DAPT and atorvastatin 80mg    #LV systolic dysfunction:   -Euvolemic, without neuro deficits.  -EF as above   - GDMT: losartan 50 mg, MS 25 mg, Imdur 30 mg  - START Farxiga 10 mg   -Farxiga has been price checked by pharmacy with $47.00 copay, daughter approved medication upon d/c)

## 2023-08-01 NOTE — PHYSICAL THERAPY INITIAL EVALUATION ADULT - PERTINENT HX OF CURRENT PROBLEM, REHAB EVAL
This is a 87 year old Ugandan speaking female, [place], PMHx CAD (s/p PCI LAD 1/28/23, OM1, PTCA mLAD), IDDM2, HTN, HLD, Hypothyroidism, Balance disorder, PAD s/p ? prior intervention, Urinary Retention c/b UTIs, recent PCI of OM1, PTCA mLAD on 7/18, presented to the ER with syncope. Per pt's daughter, pt was drinking coffee at 12AM when she slumped over on the table. Lips became bluish, hand cold. She denies any prodrome, chest pain, SOB, seizure like activity, urine and bowel incontinence

## 2023-08-01 NOTE — PATIENT PROFILE ADULT - OVER THE PAST TWO WEEKS, HAVE YOU FELT LITTLE INTEREST OR PLEASURE IN DOING THINGS?
Dear  Ronaldo Laureano MD,  Thank you very much for your referral or Ms. Roger Dodson to me for evaluation and treatment of her Hand & Wrist condition. I appreciate your confidence in me and thank you for allowing me the opportunity to care for your patients. If I can be of any further assistance to you on this or any other patient, please do not hesitate to contact me. Sincerely,  Chino Landaverde.  Dorothy Cheema MD
no

## 2023-08-01 NOTE — PATIENT PROFILE ADULT - LANGUAGE ASSISTANCE NEEDED
daughter at bedside providing  services for patient/No-Patient/Caregiver offered and refused free interpretation services.

## 2023-08-01 NOTE — DIETITIAN INITIAL EVALUATION ADULT - EDUCATION DIETARY MODIFICATIONS
Discussed/Educated pt on the importance of consuming consistent carbohydrates throughout the day. Reviewed examples of carbohydrates on the menu. To pair carbohydrates with either a protein or fat during meal times. To use oral nutrition supplement in between meals and not as a meal replacement./teach back/(2) meets goals/outcomes/verbalization

## 2023-08-01 NOTE — DIETITIAN INITIAL EVALUATION ADULT - ORAL INTAKE PTA/DIET HISTORY
Pt is Maori Speaking. Daughter present at bedside. Offered translation services, daughter refused. Reports that pt is very picky and sporadic with her appetite. States that it is always fluctuating depending on the day. Pt follows a consistent carbohydrate diet at home cooked by daughter. However, states that the pt will sneak in icecream and orange juice when no one is looking. No cultural, ethnic, Islam food preferences noted. Confirms No known food allergies. Reports UBW to be 94 pounds and stable within the last 2 years. Current dosing weight 93 pounds (7/31).

## 2023-08-01 NOTE — PROGRESS NOTE ADULT - PROBLEM SELECTOR PLAN 1
S/p syncope with admission BP 96/71  -SBP range 8/1/23: low 100s-140s   -CTH negative  Check echocardiogram  Telemetry S/p syncope with admission BP 96/71  -SBP range 8/1/23: low 100s-140s   -CTH negative  -TTE 5/18/23 revealed LVEF 49%, mild to moderate LVH, mild LVDD; moderately dilated LA and RA; aneurysmal interatrial septum; severely calcified MV with mild MR  -continue to monitor Telemetry (no events today)   -pt likelihood of no final diagnosis this admission, may need outpt tele monitoring S/p syncope with admission BP 96/71  -SBP range 8/1/23: low 100s-140s   -CTH negative  -iron studies wnl   -f/u UA (history of UTI-- pt asymptomatic at this time)   -TTE 5/18/23 revealed LVEF 49%, mild to moderate LVH, mild LVDD; moderately dilated LA and RA; aneurysmal interatrial septum; severely calcified MV with mild MR  -continue to monitor Telemetry (no events today)   -pt likelihood of no final diagnosis this admission, may need outpt tele monitoring

## 2023-08-01 NOTE — PROGRESS NOTE ADULT - PROBLEM SELECTOR PLAN 4
On Lantus 10U at home and Novolog sliding scale  Continue current medications.  Check A1C in AM On Lantus 10U at home and Novolog sliding scale  Continue current medications.  Check A1C in AM  -START Farxiga 10mg qd

## 2023-08-01 NOTE — PHYSICAL THERAPY INITIAL EVALUATION ADULT - GENERAL OBSERVATIONS, REHAB EVAL
As per MATTHEW Krishnan patient cleared for PT/OOB. received supine + tele, heplock, in NAD but somewhat lethargic, daughter at bedside.

## 2023-08-01 NOTE — DIETITIAN INITIAL EVALUATION ADULT - NSFNSNUTRHOMESUPPLEMENTFT_GEN_A_CORE
Pt takes multivitamin and iron supplementation at home. Pt consumes Ensure max 3x/day at home in addition to meals.

## 2023-08-01 NOTE — PROGRESS NOTE ADULT - PROBLEM SELECTOR PLAN 5
Continue lipitor 80mg daily  Check Lipid panel in AM Continue lipitor 80mg daily  -Trigs 156, rest of lipid panel wnl

## 2023-08-02 ENCOUNTER — TRANSCRIPTION ENCOUNTER (OUTPATIENT)
Age: 88
End: 2023-08-02

## 2023-08-02 VITALS
OXYGEN SATURATION: 100 % | TEMPERATURE: 98 F | HEART RATE: 70 BPM | DIASTOLIC BLOOD PRESSURE: 68 MMHG | SYSTOLIC BLOOD PRESSURE: 123 MMHG | RESPIRATION RATE: 17 BRPM

## 2023-08-02 LAB
ANION GAP SERPL CALC-SCNC: 10 MMOL/L — SIGNIFICANT CHANGE UP (ref 5–17)
BUN SERPL-MCNC: 30 MG/DL — HIGH (ref 7–23)
CALCIUM SERPL-MCNC: 8.6 MG/DL — SIGNIFICANT CHANGE UP (ref 8.4–10.5)
CHLORIDE SERPL-SCNC: 106 MMOL/L — SIGNIFICANT CHANGE UP (ref 96–108)
CO2 SERPL-SCNC: 19 MMOL/L — LOW (ref 22–31)
CREAT SERPL-MCNC: 0.73 MG/DL — SIGNIFICANT CHANGE UP (ref 0.5–1.3)
EGFR: 79 ML/MIN/1.73M2 — SIGNIFICANT CHANGE UP
FOLATE SERPL-MCNC: 15 NG/ML — SIGNIFICANT CHANGE UP
GLUCOSE BLDC GLUCOMTR-MCNC: 124 MG/DL — HIGH (ref 70–99)
GLUCOSE BLDC GLUCOMTR-MCNC: 186 MG/DL — HIGH (ref 70–99)
GLUCOSE SERPL-MCNC: 145 MG/DL — HIGH (ref 70–99)
HCT VFR BLD CALC: 29.7 % — LOW (ref 34.5–45)
HGB BLD-MCNC: 9.9 G/DL — LOW (ref 11.5–15.5)
MAGNESIUM SERPL-MCNC: 1.9 MG/DL — SIGNIFICANT CHANGE UP (ref 1.6–2.6)
MCHC RBC-ENTMCNC: 32 PG — SIGNIFICANT CHANGE UP (ref 27–34)
MCHC RBC-ENTMCNC: 33.3 GM/DL — SIGNIFICANT CHANGE UP (ref 32–36)
MCV RBC AUTO: 96.1 FL — SIGNIFICANT CHANGE UP (ref 80–100)
NRBC # BLD: 0 /100 WBCS — SIGNIFICANT CHANGE UP (ref 0–0)
PLATELET # BLD AUTO: 301 K/UL — SIGNIFICANT CHANGE UP (ref 150–400)
POTASSIUM SERPL-MCNC: 3.9 MMOL/L — SIGNIFICANT CHANGE UP (ref 3.5–5.3)
POTASSIUM SERPL-SCNC: 3.9 MMOL/L — SIGNIFICANT CHANGE UP (ref 3.5–5.3)
RBC # BLD: 3.09 M/UL — LOW (ref 3.8–5.2)
RBC # FLD: 13.5 % — SIGNIFICANT CHANGE UP (ref 10.3–14.5)
SODIUM SERPL-SCNC: 135 MMOL/L — SIGNIFICANT CHANGE UP (ref 135–145)
VIT B12 SERPL-MCNC: 1385 PG/ML — HIGH (ref 232–1245)
WBC # BLD: 9.64 K/UL — SIGNIFICANT CHANGE UP (ref 3.8–10.5)
WBC # FLD AUTO: 9.64 K/UL — SIGNIFICANT CHANGE UP (ref 3.8–10.5)

## 2023-08-02 PROCEDURE — 99239 HOSP IP/OBS DSCHRG MGMT >30: CPT

## 2023-08-02 RX ORDER — ISOSORBIDE MONONITRATE 60 MG/1
1 TABLET, EXTENDED RELEASE ORAL
Qty: 0 | Refills: 0 | DISCHARGE
Start: 2023-08-02

## 2023-08-02 RX ORDER — ATORVASTATIN CALCIUM 80 MG/1
1 TABLET, FILM COATED ORAL
Qty: 0 | Refills: 0 | DISCHARGE
Start: 2023-08-02

## 2023-08-02 RX ADMIN — Medication 650 MILLIGRAM(S): at 01:15

## 2023-08-02 RX ADMIN — Medication 81 MILLIGRAM(S): at 12:19

## 2023-08-02 RX ADMIN — Medication 325 MILLIGRAM(S): at 12:19

## 2023-08-02 RX ADMIN — Medication 1: at 07:55

## 2023-08-02 RX ADMIN — CLOPIDOGREL BISULFATE 75 MILLIGRAM(S): 75 TABLET, FILM COATED ORAL at 12:20

## 2023-08-02 RX ADMIN — PANTOPRAZOLE SODIUM 40 MILLIGRAM(S): 20 TABLET, DELAYED RELEASE ORAL at 06:43

## 2023-08-02 RX ADMIN — Medication 75 MICROGRAM(S): at 05:35

## 2023-08-02 RX ADMIN — Medication 3 UNIT(S): at 07:56

## 2023-08-02 RX ADMIN — Medication 650 MILLIGRAM(S): at 00:14

## 2023-08-02 NOTE — DISCHARGE NOTE NURSING/CASE MANAGEMENT/SOCIAL WORK - PATIENT PORTAL LINK FT
You can access the FollowMyHealth Patient Portal offered by [facility] by registering at the following website: [email or web address]. By joining [facility]’s FollowMyHealth portal, you will also be able to view your health information using other applications (apps) compatible with our system.

## 2023-08-02 NOTE — DISCHARGE NOTE PROVIDER - CARE PROVIDER_API CALL
Mayela Beebe  Cardiology  [address] [place] [id number]  Phone: [phone]  Fax: [phone]  Follow Up Time:

## 2023-08-02 NOTE — DISCHARGE NOTE PROVIDER - HOSPITAL COURSE
INCOMPLETE    87 year old Peruvian speaking female, [place], PMHx CAD (s/p PCI LAD 1/28/23, OM1, PTCA mLAD), IDDM2, HTN, HLD, Hypothyroidism, Balance disorder, PAD s/p ? prior intervention, Urinary Retention c/b UTIs, recent PCI of OM1, PTCA mLAD on 7/18, presented to the ER with syncope. Per pt's daughter, pt was drinking coffee at 12AM when she slumped over on the table. Lips became bluish, hand cold. She denies any prodrome, chest pain, SOB, seizure like activity, urine and bowel incontinence  In the ER, BP 96/61, 71, R 16, 97.9, EKG non ischemic, lab significant for WBC 12.5, H/H 10.9/31.6, trop 26-->17, CTH without acute event.   She is admitted for further evaluation and monitoring    Pt was admitted to [facility] overnight for observation. Today pt was seen and examined at bedside, denies complaints of chest pain, dizziness, SOB, palpitations, pain, LE edema, fever, chills. Right radial/groin access site soft, no bleeding or swelling at site, radial pulse 2+/DP/PT pulses at baseline. No events on tele overnight, VSS, Labs unremarkable/stable, Physical exam WNL. Pt was seen and examined by cardiology attending as well and is deemed stable for discharge per .__. Pt is to follow up with cardiologist __ in 1-2 weeks for post discharge check-up. All medications needing refills were e-prescribed to pt’s preferred pharmacy.  Discharge meds: 87 year old Bengali speaking female, [place], PMHx HTN, HLD, CAD (s/p multiple PCI's, most recent MARTINA OM1, PTCA mLAD on 7/14/23 @ [facility],), IDDM2, Hypothyroidism, Balance disorder, PAD s/p ? prior intervention, Urinary Retention c/b UTIs who presented to the ER with syncope. Per pt's daughter, pt was drinking coffee at 12AM when she slumped over on the table. In the ER, BP 96/61, 71, R 16, 97.9, EKG non ischemic, lab significant for WBC 12.5, H/H 10.9/31.6, trop 26-->17, CTH without acute event. She is admitted for further evaluation and monitoring.    During this hospitalization pt was monitored on telemetry with no significant events. Pt's BP was labile while inpatient, plan is to space out her BP meds to prevent any hypo/hypertension. Pt was offered EP consult for ILR, pt and daughter declined. Pt to follow up with Dr. Beebe for holter monitor.     Pt was admitted to [facility] overnight for observation. Today pt was seen and examined at bedside, denies complaints of chest pain, dizziness, SOB, palpitations, pain, LE edema, fever, chills. No events on tele overnight, VSS, Labs unremarkable/stable, Physical exam WNL. Pt was seen and examined by cardiology attending as well and is deemed stable for discharge per .__. Pt is to follow up with cardiologist __ in 1-2 weeks for post discharge check-up. All medications needing refills were e-prescribed to pt’s preferred pharmacy.    Discharge meds: 87 year old Azeri speaking female, [place], PMHx HTN, HLD, CAD (s/p multiple PCI's, most recent MARTINA OM1, PTCA mLAD on 7/14/23 @ [facility],), IDDM2, Hypothyroidism, Balance disorder, PAD s/p ? prior intervention, Urinary Retention c/b UTIs who presented to the ER with syncope. Per pt's daughter, pt was drinking coffee at 12AM when she slumped over on the table. In the ER, BP 96/61, 71, R 16, 97.9, EKG non ischemic, lab significant for WBC 12.5, H/H 10.9/31.6, trop 26-->17, CTH without acute event. She is admitted for further evaluation and monitoring.    During this hospitalization pt was monitored on telemetry with no significant events. Pt's BP was labile while inpatient, plan is to space out her BP meds to prevent any hypo/hypertension. Pt was offered EP consult for ILR, pt and daughter declined. Pt to follow up with Dr. Beebe for holter monitor. Repeat ECHO was not done given revent ECHO in 5/2023 w/ EF 49%, mild MR otherwise no significant valvular disease.     Pt was admitted to [address] [place] [address] overnight for observation. Today pt was seen and examined at bedside, denies complaints of chest pain, dizziness, SOB, palpitations, pain, LE edema, fever, chills. No events on tele overnight, VSS, Labs unremarkable/stable, Physical exam WNL. Pt was seen and examined by cardiology attending as well and is deemed stable for discharge per Dr. Lancaster. Pt is to follow up with cardiologist Dr. Beebe in 1-2 weeks for post discharge check-up. All medications needing refills were e-prescribed to pt’s preferred pharmacy.    Discharge meds: Losartan 50mg daily, Toprol 25mg daily, Aspirin 81mg daily, Plavix 75mg daily, Lipitor 80mg daily, Farxiga 10mg daily, Imdur 30mg daily

## 2023-08-02 NOTE — DISCHARGE NOTE PROVIDER - NSDCFUSCHEDAPPT_GEN_ALL_CORE_FT
[facility]  [facility] [address]  Scheduled Appointment: 09/08/2023

## 2023-08-02 NOTE — DISCHARGE NOTE PROVIDER - ATTENDING DISCHARGE PHYSICAL EXAMINATION:
Access site unremarkable no hematoma, discharge. good spirits. ambulating with walker, some assistance.

## 2023-08-02 NOTE — DISCHARGE NOTE PROVIDER - DETAILS OF MALNUTRITION DIAGNOSIS/DIAGNOSES
This patient has been assessed with a concern for Malnutrition and was treated during this hospitalization for the following Nutrition diagnosis/diagnoses:     -  08/01/2023: Severe protein-calorie malnutrition

## 2023-08-02 NOTE — DISCHARGE NOTE PROVIDER - NSDCMRMEDTOKEN_GEN_ALL_CORE_FT
aspirin 81 mg oral delayed release tablet: 1 tab(s) orally once a day  atorvastatin 80 mg oral tablet: 1 tab(s) orally once a day  Basaglar KwikPen 100 units/mL subcutaneous solution: 12 unit(s) subcutaneous once a day (at bedtime)  Cardiac Rehab: 3x/week for 12 weeks for CAD  clopidogrel 75 mg oral tablet: 1 tab(s) orally once a day  Farxiga 10 mg oral tablet: 1 tab(s) orally once a day  isosorbide mononitrate 30 mg oral tablet, extended release: 1 tab(s) orally once a day  lansoprazole 30 mg oral tablet, disintegratin tab(s) orally once a day (before a meal)  levothyroxine 75 mcg (0.075 mg) oral tablet: 1 tab(s) orally once a day  metoprolol succinate 25 mg oral tablet, extended release: 1 tab(s) orally every 24 hours  nitroglycerin 0.2 mg/hr transdermal film, extended release: 1 patch transdermal once a day  NovoLOG FlexPen 100 units/mL injectable solution: 5 unit(s) subcutaneous 3 times a day (with meals)   telmisartan 40 mg oral tablet: 1 tab(s) orally once a day   aspirin 81 mg oral delayed release tablet: 1 tab(s) orally once a day  atorvastatin 80 mg oral tablet: 1 tab(s) orally once a day (at bedtime)  Basaglar KwikPen 100 units/mL subcutaneous solution: 12 unit(s) subcutaneous once a day (at bedtime)  Cardiac Rehab: 3x/week for 12 weeks for CAD  clopidogrel 75 mg oral tablet: 1 tab(s) orally once a day  Farxiga 10 mg oral tablet: 1 tab(s) orally once a day  isosorbide mononitrate 30 mg oral tablet, extended release: 1 tab(s) orally once a day  lansoprazole 30 mg oral tablet, disintegratin tab(s) orally once a day (before a meal)  levothyroxine 75 mcg (0.075 mg) oral tablet: 1 tab(s) orally once a day  metoprolol succinate 25 mg oral tablet, extended release: 1 tab(s) orally every 24 hours  nitroglycerin 0.2 mg/hr transdermal film, extended release: 1 patch transdermal once a day  NovoLOG FlexPen 100 units/mL injectable solution: 5 unit(s) subcutaneous 3 times a day (with meals)   telmisartan 40 mg oral tablet: 1 tab(s) orally once a day   aspirin 81 mg oral delayed release tablet: 1 tab(s) orally once a day  atorvastatin 80 mg oral tablet: 1 tab(s) orally once a day (at bedtime)  Basaglar KwikPen 100 units/mL subcutaneous solution: 12 unit(s) subcutaneous once a day (at bedtime)  Cardiac Rehab: 3x/week for 12 weeks for CAD  clopidogrel 75 mg oral tablet: 1 tab(s) orally once a day  Farxiga 10 mg oral tablet: 1 tab(s) orally once a day  isosorbide mononitrate 30 mg oral tablet, extended release: 1 tab(s) orally once a day  lansoprazole 30 mg oral tablet, disintegratin tab(s) orally once a day (before a meal)  levothyroxine 75 mcg (0.075 mg) oral tablet: 1 tab(s) orally once a day  metoprolol succinate 25 mg oral tablet, extended release: 1 tab(s) orally every 24 hours  nitroglycerin 0.2 mg/hr transdermal film, extended release: 1 patch transdermal once a day  NovoLOG FlexPen 100 units/mL injectable solution: 5 unit(s) subcutaneous 3 times a day (with meals)   Physical therapy 2-3x/ week x 12 weeks: Outpatient PT  telmisartan 40 mg oral tablet: 1 tab(s) orally once a day

## 2023-08-02 NOTE — DISCHARGE NOTE PROVIDER - NSDCCPCAREPLAN_GEN_ALL_CORE_FT
PRINCIPAL DISCHARGE DIAGNOSIS  Diagnosis: Syncope  Assessment and Plan of Treatment: You presented to the hospital after a "fanting episode". You had CT scan of your head which did not show any abnormalities. You were monitored on the telemetry which did not show any abnormal heart rhythms. You were recommended to have a loop recorder placement which is a small device placed right under the skin to record your heart rhythm continuously. You opted not to have this done in the hospital, another option is to have an external heart monitor for 2 weeks which can be set up by Dr. Beebe. If you feel lightheaded/dizzy or feel that you are about to pass out please remain seated or sit down to make sure you dont fall and hurt yourself. Also space out your blood pressure medications so that your blood pressure doesn't drop too fast and cause you to be dizzy.      SECONDARY DISCHARGE DIAGNOSES  Diagnosis: Hypertension  Assessment and Plan of Treatment: You have a history of elevated blood pressure and you should continue your blood pressure medications as prescribed.      Diagnosis: Diabetes mellitus  Assessment and Plan of Treatment: You have a diagnosis of diabetes and should continue all of your diabetic medications as prescribed.       Diagnosis: Hyperlipidemia  Assessment and Plan of Treatment: Please continue your daily statin to ensure your cardiac stent remains open.      Diagnosis: CAD (coronary artery disease)  Assessment and Plan of Treatment: You have a history of coronary disease with recent stents. Please continue taking Aspirin and Plavix daily to maintain your stents open.

## 2023-08-03 NOTE — HISTORY OF PRESENT ILLNESS
[FreeTextEntry1] : 87 F [place] citizen SPeaking CAD s/p PCI LAD  at [facility] IDDM HTN HLD Hypothyroid Balance disorder PAD UTI LV Dysfunction EF 49% PCI OM1 7/31/2023   here post cath with right groin pain no groin hematoma. she has resumption of chest discomfort but its all at rest    ecg nsr 7/5/2023 MPI 5/18/2023 Normal perfusion EF 37% Echo EF 49%

## 2023-08-03 NOTE — ASSESSMENT
[FreeTextEntry1] : - initially met was on numerous antianginals including diltiazem, ranexa, metoprolol still with chest pain she had dizziness with ranexa and diltiazem - for now increase imdur 60 mg at bedtime  - trial of lansoprazole for her stomach which has helped - CAD on atorvastatin 40 mg daily  - HTN stay on telmisartan 40 mg daily - palpitations on toprol xl 25 mg daily - LDL is elevated due to elevated glucose given her age would prefer we address her hyperglycemia first before increasing statin dose  - fu in one month

## 2023-08-07 DIAGNOSIS — E11.9 TYPE 2 DIABETES MELLITUS WITHOUT COMPLICATIONS: ICD-10-CM

## 2023-08-07 DIAGNOSIS — E03.9 HYPOTHYROIDISM, UNSPECIFIED: ICD-10-CM

## 2023-08-07 DIAGNOSIS — I95.9 HYPOTENSION, UNSPECIFIED: ICD-10-CM

## 2023-08-07 DIAGNOSIS — I25.10 ATHEROSCLEROTIC HEART DISEASE OF NATIVE CORONARY ARTERY WITHOUT ANGINA PECTORIS: ICD-10-CM

## 2023-08-07 DIAGNOSIS — R55 SYNCOPE AND COLLAPSE: ICD-10-CM

## 2023-08-07 DIAGNOSIS — E43 UNSPECIFIED SEVERE PROTEIN-CALORIE MALNUTRITION: ICD-10-CM

## 2023-08-07 DIAGNOSIS — Z79.4 LONG TERM (CURRENT) USE OF INSULIN: ICD-10-CM

## 2023-08-07 DIAGNOSIS — Z88.8 ALLERGY STATUS TO OTHER DRUGS, MEDICAMENTS AND BIOLOGICAL SUBSTANCES: ICD-10-CM

## 2023-08-07 DIAGNOSIS — I10 ESSENTIAL (PRIMARY) HYPERTENSION: ICD-10-CM

## 2023-08-07 DIAGNOSIS — Z95.5 PRESENCE OF CORONARY ANGIOPLASTY IMPLANT AND GRAFT: ICD-10-CM

## 2023-08-07 DIAGNOSIS — E78.5 HYPERLIPIDEMIA, UNSPECIFIED: ICD-10-CM

## 2023-08-10 ENCOUNTER — APPOINTMENT (OUTPATIENT)
Dept: HEART AND VASCULAR | Facility: CLINIC | Age: 88
End: 2023-08-10
Payer: MEDICARE

## 2023-08-10 ENCOUNTER — NON-APPOINTMENT (OUTPATIENT)
Age: 88
End: 2023-08-10

## 2023-08-10 VITALS
DIASTOLIC BLOOD PRESSURE: 62 MMHG | SYSTOLIC BLOOD PRESSURE: 120 MMHG | BODY MASS INDEX: 20.47 KG/M2 | WEIGHT: 90.98 LBS | HEIGHT: 56 IN | OXYGEN SATURATION: 99 % | HEART RATE: 72 BPM | TEMPERATURE: 98.2 F

## 2023-08-10 VITALS — SYSTOLIC BLOOD PRESSURE: 110 MMHG | DIASTOLIC BLOOD PRESSURE: 60 MMHG

## 2023-08-10 DIAGNOSIS — R55 SYNCOPE AND COLLAPSE: ICD-10-CM

## 2023-08-10 PROCEDURE — 99214 OFFICE O/P EST MOD 30 MIN: CPT | Mod: 25

## 2023-08-10 PROCEDURE — 93000 ELECTROCARDIOGRAM COMPLETE: CPT

## 2023-08-10 RX ORDER — ASPIRIN 81 MG
81 TABLET, DELAYED RELEASE (ENTERIC COATED) ORAL DAILY
Refills: 0 | Status: ACTIVE | COMMUNITY

## 2023-08-11 NOTE — ASSESSMENT
[FreeTextEntry1] : - initially met was on numerous antianginals including diltiazem, ranexa, metoprolol still with chest pain she had dizziness with ranexa and diltiazem - angina on imdur 60 mg at bedtime  - trial of lansoprazole for her stomach which has helped - CAD on atorvastatin 40 mg daily lowered from 80 given her advanced age her lipids will improve if her sugars improve - HTN decrease telmisartan 20 mg daily as she is on farxiga 10 mg now  - palpitations/angina on toprol xl 25 mg daily - LDL is elevated due to elevated glucose given her age would prefer we address her hyperglycemia first before increasing statin dose  - syncope will do 2 week holter she refused ILR - fu in one month

## 2023-08-11 NOTE — HISTORY OF PRESENT ILLNESS
[FreeTextEntry1] : 87 F [place] SPeaking CAD s/p PCI LAD  at [facility] IDDM HTN HLD Hypothyroid Balance disorder PAD UTI LV Dysfunction EF 49% PCI OM1 7/31/2023  /admitted to [facility] from 7/31/2023-8/2/2023 for syncope meds were adjusted imdur was lowered. daughter declined ILR. today she feels great rare chest pain. negative CT head.   ecg nsr 8/10/2023 MPI 5/18/2023 Normal perfusion EF 37% Echo EF 49% 2023

## 2023-08-25 ENCOUNTER — APPOINTMENT (OUTPATIENT)
Dept: HEART AND VASCULAR | Facility: CLINIC | Age: 88
End: 2023-08-25

## 2023-09-07 NOTE — ASSESSMENT
[FreeTextEntry1] : 1. T2D A1C goal <8% A1C - 10% ( 5/8/23) from 11.3% (2/6/23) from 10% (10/21/22) Current regimen: Lantus 10 units qhs, Novolog per scale-- If pre-meal sugar is <150, take 4 units, If pre-meal sugar is 150-200, take 5 units , If pre-meal sugar is 201-250, take 6 units, If pre-meal sugar is 251-300, take 7 units, If pre-meal sugar is 301-350, take 8 units, If pre-meal sugar is >350, take 9 units  Daughter is administering insulin and monitoring patients BG with Libre2, verbalizes ranges (As above) with intermittent hypoglycemia Glucometer reading performed in office today is 46 2H postprandial (1H following prandial insulin) and then 90 following correction  Daughter continues to endorse lability of glycemic patterns, which is likely resultant from her varying basal insulin, as well as providing corrections after eating that are too high for the sugar values.  For instance, today, 1H postprandially , so she administered 2 units of insulin with resultant 46, corrected to 90 in office.  Discussed glycemic goals of A1C <8.5%, fasting -150 to be conservative and 2H postprandial <225. Recommended to not adjust basal insulin based on bedtime BG. Prefers scale, recommend adjustments, as below: -- continue Lantus 10 units daily -- For Novolog, NO dosing with breakfast. To start to inject before meal (even if only <5 minutes before) and scaled updated to:  If pre-meal sugar is <100: NO Novolog, If pre-meal sugar is 100-150: 2 units,  If pre-meal sugar is 151-200: 3 units, If pre-meal sugar is 201-250: 4 units, If pre-meal sugar is >251: 5 units   -- Continue use of Freestyle Libre2  JOSE  has diabetes mellitus, has been using a home blood glucose monitor and reports testing 4 times a day using the meter. she  is insulin-treated with three or more daily injections of insulin. I have seen the JOSE within the last 6 months, I have observed their glucose control and determined that based on the above, the patient requires a therapeutic CGM to properly manage their blood sugars. her  insulin treatment regimen requires frequent adjustments on the basis of therapeutic CGM testing results . JOSE will have a follow-up visit within the first 6 months of CGM prescription in order to evaluate adherence to CGM regimen and their diabetes treatment plan.  2.  HLD On atorvastatin 40mg 2/6/23  -- continue statin, as above  4. Hypothyroidism Current thyroid regimen: Synthroid 75mcg QAM, on this dose for years 2/6/23 TSH 5 -- continue LT4 75mcg -- repeat with next labs  I will call daughter in 1 week to FU on BG with adjustments Follow up in 2 months, or sooner, as needed  Melissa Jerome MS, FNP-BC, [facility] 07/11/2023

## 2023-09-07 NOTE — HISTORY OF PRESENT ILLNESS
[FreeTextEntry1] : JOSE PABLO is a 87 year female with pmhx of T2D (>30 years ago), HTN, HLD, hypothyroidism who presents for T2D evaluation.  Presents with Alana, daughter, providing Stateless interpretation, as needed.  She is active in [facility] care Diabetes previously managed by PCP, Dr Duval  Last visit with myself-- 7/11/23  Interval change: Per daughter +variability of sugar Last night, gave 12 units with fasting BG 58, oatmeal and coffee 205 45min after eating, 2 units administered 1H after eating given BG >200 with resultant hypo in office Sometimes gives Novolog before meal or waits to see what sugar is after. Only giving novolog if psot meal BG >200 ~1H after eating, with resultant hypo following breakfast predominantly.  Increases lantus if evening BG elevated to 12units with resultant lows Using [facility] Ceci 2, reader not available for review, left at home   A1C - 10% (5/8/23) from11.3% (2/6/23) from 10% (10/21/22) Office Fingerstick -- 46 2H postprandial, repeat 15min after 15g CHO (and 10 min following additional 15g CHO to total 30g) 90  Current medication: - Lantus 10 units at night  - Novolog per scale-- If pre-meal sugar is <150, take 4 units, If pre-meal sugar is 150-200, take 5 units , If pre-meal sugar is 201-250, take 6 units, If pre-meal sugar is 251-300, take 7 units, If pre-meal sugar is 301-350, take 8 units, If pre-meal sugar is >350, take 9 units  Telmisartan 40mg Atorvastatin 40mg  Past medication: - metformin 1000mg, stopped with starting MDI and GI upset - Januvia - actos - glyburide  JOSE reports they take their diabetes medication ALL of the time. JOSE ENDORSES hypoglycemia symptoms or BG <70, as above  Diabetes Self-Management: -- fasting  -- after breakfast 205 today (1H after) -215 -- after lunch 250-300 -- after dinner 250-350, pending eating  Diet-- Typically consumes ~2-3 meals/daily, +/- snacks - small meals  Ophthalmology: >1 year, will schedule FU, referral provided 2/6/23 Podiatry: >1 year, will schedule FU, referral provided 2/6/23  2. Hypothyroidism Dx ~15 years ago Current regimen: Synthroid 75 mcg QAM daily - on this dose for years No h/o radiation exposure

## 2023-09-08 ENCOUNTER — APPOINTMENT (OUTPATIENT)
Dept: ENDOCRINOLOGY | Facility: CLINIC | Age: 88
End: 2023-09-08

## 2023-09-21 ENCOUNTER — APPOINTMENT (OUTPATIENT)
Dept: HEART AND VASCULAR | Facility: CLINIC | Age: 88
End: 2023-09-21
Payer: MEDICARE

## 2023-09-21 ENCOUNTER — NON-APPOINTMENT (OUTPATIENT)
Age: 88
End: 2023-09-21

## 2023-09-21 VITALS
TEMPERATURE: 98.2 F | HEIGHT: 56 IN | BODY MASS INDEX: 21.15 KG/M2 | OXYGEN SATURATION: 99 % | DIASTOLIC BLOOD PRESSURE: 70 MMHG | SYSTOLIC BLOOD PRESSURE: 110 MMHG | HEART RATE: 83 BPM | WEIGHT: 94 LBS

## 2023-09-21 PROCEDURE — 99214 OFFICE O/P EST MOD 30 MIN: CPT | Mod: 25

## 2023-09-21 PROCEDURE — 93000 ELECTROCARDIOGRAM COMPLETE: CPT

## 2023-09-21 RX ORDER — EZETIMIBE 10 MG/1
10 TABLET ORAL
Qty: 90 | Refills: 3 | Status: ACTIVE | COMMUNITY
Start: 2023-09-21 | End: 1900-01-01

## 2023-09-22 LAB
ALBUMIN SERPL ELPH-MCNC: 3.8 G/DL
ALP BLD-CCNC: 100 U/L
ALT SERPL-CCNC: 10 U/L
ANION GAP SERPL CALC-SCNC: 10 MMOL/L
AST SERPL-CCNC: 16 U/L
BILIRUB SERPL-MCNC: 0.2 MG/DL
BUN SERPL-MCNC: 26 MG/DL
CALCIUM SERPL-MCNC: 9.9 MG/DL
CHLORIDE SERPL-SCNC: 99 MMOL/L
CHOLEST SERPL-MCNC: 273 MG/DL
CO2 SERPL-SCNC: 25 MMOL/L
CREAT SERPL-MCNC: 0.72 MG/DL
EGFR: 80 ML/MIN/1.73M2
ESTIMATED AVERAGE GLUCOSE: 255 MG/DL
GLUCOSE SERPL-MCNC: 424 MG/DL
HBA1C MFR BLD HPLC: 10.5 %
HDLC SERPL-MCNC: 64 MG/DL
LDLC SERPL CALC-MCNC: 164 MG/DL
NONHDLC SERPL-MCNC: 210 MG/DL
POTASSIUM SERPL-SCNC: 5.3 MMOL/L
PROT SERPL-MCNC: 6.9 G/DL
SODIUM SERPL-SCNC: 133 MMOL/L
TRIGL SERPL-MCNC: 247 MG/DL

## 2023-09-26 RX ORDER — DAPAGLIFLOZIN 10 MG/1
10 TABLET, FILM COATED ORAL DAILY
Qty: 90 | Refills: 2 | Status: ACTIVE | COMMUNITY
Start: 1900-01-01 | End: 1900-01-01

## 2023-11-29 ENCOUNTER — APPOINTMENT (OUTPATIENT)
Dept: ENDOCRINOLOGY | Facility: CLINIC | Age: 88
End: 2023-11-29
Payer: MEDICARE

## 2023-11-29 VITALS
DIASTOLIC BLOOD PRESSURE: 74 MMHG | HEART RATE: 78 BPM | WEIGHT: 98 LBS | BODY MASS INDEX: 22.04 KG/M2 | SYSTOLIC BLOOD PRESSURE: 171 MMHG | HEIGHT: 56 IN

## 2023-11-29 LAB
GLUCOSE BLDC GLUCOMTR-MCNC: 221
HBA1C MFR BLD HPLC: 10.2

## 2023-11-29 PROCEDURE — 99214 OFFICE O/P EST MOD 30 MIN: CPT | Mod: 25

## 2023-11-29 PROCEDURE — 82962 GLUCOSE BLOOD TEST: CPT

## 2023-11-29 PROCEDURE — 83036 HEMOGLOBIN GLYCOSYLATED A1C: CPT | Mod: QW

## 2023-12-08 ENCOUNTER — APPOINTMENT (OUTPATIENT)
Dept: CT IMAGING | Facility: CLINIC | Age: 88
End: 2023-12-08
Payer: MEDICARE

## 2023-12-08 ENCOUNTER — NON-APPOINTMENT (OUTPATIENT)
Age: 88
End: 2023-12-08

## 2023-12-08 ENCOUNTER — APPOINTMENT (OUTPATIENT)
Dept: HEART AND VASCULAR | Facility: CLINIC | Age: 88
End: 2023-12-08
Payer: MEDICARE

## 2023-12-08 VITALS — SYSTOLIC BLOOD PRESSURE: 136 MMHG | OXYGEN SATURATION: 96 % | HEART RATE: 65 BPM | DIASTOLIC BLOOD PRESSURE: 113 MMHG

## 2023-12-08 DIAGNOSIS — G44.319 ACUTE POST-TRAUMATIC HEADACHE, NOT INTRACTABLE: ICD-10-CM

## 2023-12-08 PROCEDURE — 99214 OFFICE O/P EST MOD 30 MIN: CPT | Mod: 25

## 2023-12-08 PROCEDURE — 93000 ELECTROCARDIOGRAM COMPLETE: CPT

## 2023-12-08 PROCEDURE — 36415 COLL VENOUS BLD VENIPUNCTURE: CPT

## 2023-12-08 PROCEDURE — 70450 CT HEAD/BRAIN W/O DYE: CPT

## 2023-12-14 LAB
ALBUMIN SERPL ELPH-MCNC: 2.9 G/DL
ALP BLD-CCNC: 87 U/L
ALT SERPL-CCNC: 9 U/L
ANION GAP SERPL CALC-SCNC: 13 MMOL/L
AST SERPL-CCNC: 17 U/L
BILIRUB SERPL-MCNC: <0.2 MG/DL
BUN SERPL-MCNC: 25 MG/DL
CALCIUM SERPL-MCNC: 9 MG/DL
CHLORIDE SERPL-SCNC: 103 MMOL/L
CO2 SERPL-SCNC: 17 MMOL/L
CREAT SERPL-MCNC: 0.71 MG/DL
EGFR: 82 ML/MIN/1.73M2
GLUCOSE SERPL-MCNC: 72 MG/DL
HCT VFR BLD CALC: 36.8 %
HGB BLD-MCNC: 11.8 G/DL
MCHC RBC-ENTMCNC: 31.3 PG
MCHC RBC-ENTMCNC: 32.1 GM/DL
MCV RBC AUTO: 97.6 FL
PLATELET # BLD AUTO: 154 K/UL
POTASSIUM SERPL-SCNC: 4.9 MMOL/L
PROT SERPL-MCNC: 6.2 G/DL
RBC # BLD: 3.77 M/UL
RBC # FLD: 14 %
SODIUM SERPL-SCNC: 134 MMOL/L
WBC # FLD AUTO: 8.29 K/UL

## 2023-12-18 ENCOUNTER — EMERGENCY (EMERGENCY)
Facility: HOSPITAL | Age: 88
LOS: 1 days | Discharge: HOME | End: 2023-12-18
Attending: EMERGENCY MEDICINE | Admitting: EMERGENCY MEDICINE
Payer: MEDICARE

## 2023-12-18 VITALS
TEMPERATURE: 98 F | HEART RATE: 66 BPM | SYSTOLIC BLOOD PRESSURE: 164 MMHG | DIASTOLIC BLOOD PRESSURE: 82 MMHG | OXYGEN SATURATION: 97 % | RESPIRATION RATE: 16 BRPM

## 2023-12-18 VITALS
SYSTOLIC BLOOD PRESSURE: 94 MMHG | RESPIRATION RATE: 18 BRPM | TEMPERATURE: 97 F | HEART RATE: 78 BPM | DIASTOLIC BLOOD PRESSURE: 66 MMHG | OXYGEN SATURATION: 97 %

## 2023-12-18 DIAGNOSIS — Z95.5 PRESENCE OF CORONARY ANGIOPLASTY IMPLANT AND GRAFT: ICD-10-CM

## 2023-12-18 DIAGNOSIS — R07.89 OTHER CHEST PAIN: ICD-10-CM

## 2023-12-18 DIAGNOSIS — I11.9 HYPERTENSIVE HEART DISEASE WITHOUT HEART FAILURE: ICD-10-CM

## 2023-12-18 DIAGNOSIS — E03.9 HYPOTHYROIDISM, UNSPECIFIED: ICD-10-CM

## 2023-12-18 DIAGNOSIS — E78.5 HYPERLIPIDEMIA, UNSPECIFIED: ICD-10-CM

## 2023-12-18 DIAGNOSIS — Z90.49 ACQUIRED ABSENCE OF OTHER SPECIFIED PARTS OF DIGESTIVE TRACT: Chronic | ICD-10-CM

## 2023-12-18 DIAGNOSIS — R51.9 HEADACHE, UNSPECIFIED: ICD-10-CM

## 2023-12-18 DIAGNOSIS — I25.10 ATHEROSCLEROTIC HEART DISEASE OF NATIVE CORONARY ARTERY WITHOUT ANGINA PECTORIS: ICD-10-CM

## 2023-12-18 DIAGNOSIS — Z95.5 PRESENCE OF CORONARY ANGIOPLASTY IMPLANT AND GRAFT: Chronic | ICD-10-CM

## 2023-12-18 DIAGNOSIS — R06.02 SHORTNESS OF BREATH: ICD-10-CM

## 2023-12-18 LAB
ANION GAP SERPL CALC-SCNC: 9 MMOL/L — SIGNIFICANT CHANGE UP (ref 5–17)
ANION GAP SERPL CALC-SCNC: 9 MMOL/L — SIGNIFICANT CHANGE UP (ref 5–17)
BASOPHILS # BLD AUTO: 0.05 K/UL — SIGNIFICANT CHANGE UP (ref 0–0.2)
BASOPHILS # BLD AUTO: 0.05 K/UL — SIGNIFICANT CHANGE UP (ref 0–0.2)
BASOPHILS NFR BLD AUTO: 0.5 % — SIGNIFICANT CHANGE UP (ref 0–2)
BASOPHILS NFR BLD AUTO: 0.5 % — SIGNIFICANT CHANGE UP (ref 0–2)
BUN SERPL-MCNC: 28 MG/DL — HIGH (ref 7–23)
BUN SERPL-MCNC: 28 MG/DL — HIGH (ref 7–23)
CALCIUM SERPL-MCNC: 9.2 MG/DL — SIGNIFICANT CHANGE UP (ref 8.4–10.5)
CALCIUM SERPL-MCNC: 9.2 MG/DL — SIGNIFICANT CHANGE UP (ref 8.4–10.5)
CHLORIDE SERPL-SCNC: 106 MMOL/L — SIGNIFICANT CHANGE UP (ref 96–108)
CHLORIDE SERPL-SCNC: 106 MMOL/L — SIGNIFICANT CHANGE UP (ref 96–108)
CO2 SERPL-SCNC: 24 MMOL/L — SIGNIFICANT CHANGE UP (ref 22–31)
CO2 SERPL-SCNC: 24 MMOL/L — SIGNIFICANT CHANGE UP (ref 22–31)
CREAT SERPL-MCNC: 0.92 MG/DL — SIGNIFICANT CHANGE UP (ref 0.5–1.3)
CREAT SERPL-MCNC: 0.92 MG/DL — SIGNIFICANT CHANGE UP (ref 0.5–1.3)
EGFR: 60 ML/MIN/1.73M2 — SIGNIFICANT CHANGE UP
EGFR: 60 ML/MIN/1.73M2 — SIGNIFICANT CHANGE UP
EOSINOPHIL # BLD AUTO: 0.18 K/UL — SIGNIFICANT CHANGE UP (ref 0–0.5)
EOSINOPHIL # BLD AUTO: 0.18 K/UL — SIGNIFICANT CHANGE UP (ref 0–0.5)
EOSINOPHIL NFR BLD AUTO: 1.9 % — SIGNIFICANT CHANGE UP (ref 0–6)
EOSINOPHIL NFR BLD AUTO: 1.9 % — SIGNIFICANT CHANGE UP (ref 0–6)
GLUCOSE SERPL-MCNC: 242 MG/DL — HIGH (ref 70–99)
GLUCOSE SERPL-MCNC: 242 MG/DL — HIGH (ref 70–99)
HCT VFR BLD CALC: 33.1 % — LOW (ref 34.5–45)
HCT VFR BLD CALC: 33.1 % — LOW (ref 34.5–45)
HGB BLD-MCNC: 11.1 G/DL — LOW (ref 11.5–15.5)
HGB BLD-MCNC: 11.1 G/DL — LOW (ref 11.5–15.5)
IMM GRANULOCYTES NFR BLD AUTO: 0.3 % — SIGNIFICANT CHANGE UP (ref 0–0.9)
IMM GRANULOCYTES NFR BLD AUTO: 0.3 % — SIGNIFICANT CHANGE UP (ref 0–0.9)
LYMPHOCYTES # BLD AUTO: 2 K/UL — SIGNIFICANT CHANGE UP (ref 1–3.3)
LYMPHOCYTES # BLD AUTO: 2 K/UL — SIGNIFICANT CHANGE UP (ref 1–3.3)
LYMPHOCYTES # BLD AUTO: 20.9 % — SIGNIFICANT CHANGE UP (ref 13–44)
LYMPHOCYTES # BLD AUTO: 20.9 % — SIGNIFICANT CHANGE UP (ref 13–44)
MCHC RBC-ENTMCNC: 31 PG — SIGNIFICANT CHANGE UP (ref 27–34)
MCHC RBC-ENTMCNC: 31 PG — SIGNIFICANT CHANGE UP (ref 27–34)
MCHC RBC-ENTMCNC: 33.5 GM/DL — SIGNIFICANT CHANGE UP (ref 32–36)
MCHC RBC-ENTMCNC: 33.5 GM/DL — SIGNIFICANT CHANGE UP (ref 32–36)
MCV RBC AUTO: 92.5 FL — SIGNIFICANT CHANGE UP (ref 80–100)
MCV RBC AUTO: 92.5 FL — SIGNIFICANT CHANGE UP (ref 80–100)
MONOCYTES # BLD AUTO: 0.71 K/UL — SIGNIFICANT CHANGE UP (ref 0–0.9)
MONOCYTES # BLD AUTO: 0.71 K/UL — SIGNIFICANT CHANGE UP (ref 0–0.9)
MONOCYTES NFR BLD AUTO: 7.4 % — SIGNIFICANT CHANGE UP (ref 2–14)
MONOCYTES NFR BLD AUTO: 7.4 % — SIGNIFICANT CHANGE UP (ref 2–14)
NEUTROPHILS # BLD AUTO: 6.62 K/UL — SIGNIFICANT CHANGE UP (ref 1.8–7.4)
NEUTROPHILS # BLD AUTO: 6.62 K/UL — SIGNIFICANT CHANGE UP (ref 1.8–7.4)
NEUTROPHILS NFR BLD AUTO: 69 % — SIGNIFICANT CHANGE UP (ref 43–77)
NEUTROPHILS NFR BLD AUTO: 69 % — SIGNIFICANT CHANGE UP (ref 43–77)
NRBC # BLD: 0 /100 WBCS — SIGNIFICANT CHANGE UP (ref 0–0)
NRBC # BLD: 0 /100 WBCS — SIGNIFICANT CHANGE UP (ref 0–0)
NT-PROBNP SERPL-SCNC: 2322 PG/ML — HIGH (ref 0–300)
NT-PROBNP SERPL-SCNC: 2322 PG/ML — HIGH (ref 0–300)
PLATELET # BLD AUTO: 241 K/UL — SIGNIFICANT CHANGE UP (ref 150–400)
PLATELET # BLD AUTO: 241 K/UL — SIGNIFICANT CHANGE UP (ref 150–400)
POTASSIUM SERPL-MCNC: 5.3 MMOL/L — SIGNIFICANT CHANGE UP (ref 3.5–5.3)
POTASSIUM SERPL-MCNC: 5.3 MMOL/L — SIGNIFICANT CHANGE UP (ref 3.5–5.3)
POTASSIUM SERPL-SCNC: 5.3 MMOL/L — SIGNIFICANT CHANGE UP (ref 3.5–5.3)
POTASSIUM SERPL-SCNC: 5.3 MMOL/L — SIGNIFICANT CHANGE UP (ref 3.5–5.3)
RBC # BLD: 3.58 M/UL — LOW (ref 3.8–5.2)
RBC # BLD: 3.58 M/UL — LOW (ref 3.8–5.2)
RBC # FLD: 13.8 % — SIGNIFICANT CHANGE UP (ref 10.3–14.5)
RBC # FLD: 13.8 % — SIGNIFICANT CHANGE UP (ref 10.3–14.5)
SODIUM SERPL-SCNC: 139 MMOL/L — SIGNIFICANT CHANGE UP (ref 135–145)
SODIUM SERPL-SCNC: 139 MMOL/L — SIGNIFICANT CHANGE UP (ref 135–145)
TROPONIN T, HIGH SENSITIVITY RESULT: 25 NG/L — SIGNIFICANT CHANGE UP (ref 0–51)
TROPONIN T, HIGH SENSITIVITY RESULT: 25 NG/L — SIGNIFICANT CHANGE UP (ref 0–51)
TROPONIN T, HIGH SENSITIVITY RESULT: 28 NG/L — SIGNIFICANT CHANGE UP (ref 0–51)
TROPONIN T, HIGH SENSITIVITY RESULT: 28 NG/L — SIGNIFICANT CHANGE UP (ref 0–51)
WBC # BLD: 9.59 K/UL — SIGNIFICANT CHANGE UP (ref 3.8–10.5)
WBC # BLD: 9.59 K/UL — SIGNIFICANT CHANGE UP (ref 3.8–10.5)
WBC # FLD AUTO: 9.59 K/UL — SIGNIFICANT CHANGE UP (ref 3.8–10.5)
WBC # FLD AUTO: 9.59 K/UL — SIGNIFICANT CHANGE UP (ref 3.8–10.5)

## 2023-12-18 PROCEDURE — 99285 EMERGENCY DEPT VISIT HI MDM: CPT

## 2023-12-18 PROCEDURE — 71045 X-RAY EXAM CHEST 1 VIEW: CPT | Mod: 26

## 2023-12-18 RX ORDER — ACETAMINOPHEN 500 MG
1000 TABLET ORAL ONCE
Refills: 0 | Status: COMPLETED | OUTPATIENT
Start: 2023-12-18 | End: 2023-12-18

## 2023-12-18 RX ADMIN — Medication 1000 MILLIGRAM(S): at 13:14

## 2023-12-18 NOTE — ED ADULT NURSE NOTE - NSFALLRISKINTERV_ED_ALL_ED
Assistance OOB with selected safe patient handling equipment if applicable/Assistance with ambulation/Communicate fall risk and risk factors to all staff, patient, and family/Monitor gait and stability/Provide patient with walking aids/Provide visual cue: yellow wristband, yellow gown, etc/Reinforce activity limits and safety measures with patient and family/Call bell, personal items and telephone in reach/Instruct patient to call for assistance before getting out of bed/chair/stretcher/Non-slip footwear applied when patient is off stretcher/[place] to call system/Physically safe environment - no spills, clutter or unnecessary equipment/Purposeful Proactive Rounding/Room/bathroom lighting operational, light cord in reach Assistance OOB with selected safe patient handling equipment if applicable/Assistance with ambulation/Communicate fall risk and risk factors to all staff, patient, and family/Monitor gait and stability/Provide patient with walking aids/Provide visual cue: yellow wristband, yellow gown, etc/Reinforce activity limits and safety measures with patient and family/Call bell, personal items and telephone in reach/Instruct patient to call for assistance before getting out of bed/chair/stretcher/Non-slip footwear applied when patient is off stretcher/[place] to call system/Physically safe environment - no spills, clutter or unnecessary equipment/Purposeful Proactive Rounding/Room/bathroom lighting operational, light cord in reach

## 2023-12-18 NOTE — ED PROVIDER NOTE - OBJECTIVE STATEMENT
87 year old Azeri speaking female, [place], PMHx HTN, HLD, CAD (s/p multiple PCI's, most recent MARTINA OM1, PTCA mLAD on 7/14/23 @ [facility],), IDDM2, Hypothyroidism, Balance disorder, PAD s/p ? prior intervention, Urinary Retention c/b UTIs, presents to ED with 87 year old Kinyarwanda speaking female, [place], PMHx HTN, HLD, CAD (s/p multiple PCI's, most recent MARTINA OM1, PTCA mLAD on 7/14/23 @ [facility],), IDDM2, Hypothyroidism, Balance disorder, PAD s/p ? prior intervention, Urinary Retention c/b UTIs, presents to ED with 87 year old Telugu speaking female, [place], PMHx HTN, HLD, CAD (s/p multiple PCI's, most recent MARTINA OM1, PTCA mLAD on 7/14/23 @ [facility],), IDDM2, Hypothyroidism, Balance disorder, PAD s/p ? prior intervention, Urinary Retention c/b UTIs, presents to ED with multiple complaints of chest pain and shortness of breath since last night.  As per Dr. Sutherland's notes pt has had similar symptoms in past and on multiple angina medications causing pt to become hypotensive at times and therefore had to stop medications.  Pt also being followed by retinal specialist for increased left eye pressure - pt using eye drops and suppose to have seen retinal specialist today.  Denies nausea, vomiting ,focal weakness or numbness. 87 year old Sinhala speaking female, [place], PMHx HTN, HLD, CAD (s/p multiple PCI's, most recent MARTINA OM1, PTCA mLAD on 7/14/23 @ [facility],), IDDM2, Hypothyroidism, Balance disorder, PAD s/p ? prior intervention, Urinary Retention c/b UTIs, presents to ED with multiple complaints of chest pain and shortness of breath since last night.  As per Dr. Sutherland's notes pt has had similar symptoms in past and on multiple angina medications causing pt to become hypotensive at times and therefore had to stop medications.  Pt also being followed by retinal specialist for increased left eye pressure - pt using eye drops and suppose to have seen retinal specialist today.  Denies nausea, vomiting ,focal weakness or numbness.

## 2023-12-18 NOTE — ED PROVIDER NOTE - NSFOLLOWUPINSTRUCTIONS_ED_ALL_ED_FT
Follow up with Dr. Sutherland this week.  Follow up with retinal specialist as scheduled.    Nonspecific Chest Pain  Chest pain can be caused by many different conditions. Some causes of chest pain can be life-threatening. These will require treatment right away. Serious causes of chest pain include:  Heart attack.  A tear in the body's main blood vessel.  Redness and swelling (inflammation) around your heart.  Blood clot in your lungs.  Other causes of chest pain may not be so serious. These include:  Heartburn.  Anxiety or stress.  Damage to bones or muscles in your chest.  Lung infections.  Chest pain can feel like:  Pain or discomfort in your chest.  Crushing, pressure, aching, or squeezing pain.  Burning or tingling.  Dull or sharp pain that is worse when you move, cough, or take a deep breath.  Pain or discomfort that is also felt in your back, neck, jaw, shoulder, or arm, or pain that spreads to any of these areas.  It is hard to know whether your pain is caused by something that is serious or something that is not so serious. So it is important to see your doctor right away if you have chest pain.    Follow these instructions at home:  Medicines    Take over-the-counter and prescription medicines only as told by your doctor.  If you were prescribed an antibiotic medicine, take it as told by your doctor. Do not stop taking the antibiotic even if you start to feel better.  Lifestyle    A plate along with examples of foods in a healthy diet.  Rest as told by your doctor.  Do not use any products that contain nicotine or tobacco, such as cigarettes, e-cigarettes, and chewing tobacco. If you need help quitting, ask your doctor.  Do not drink alcohol.  Make lifestyle changes as told by your doctor. These may include:  Getting regular exercise. Ask your doctor what activities are safe for you.  Eating a heart-healthy diet. A diet and nutrition specialist (dietitian) can help you to learn healthy eating options.  Staying at a healthy weight.  Treating diabetes or high blood pressure, if needed.  Lowering your stress. Activities such as yoga and relaxation techniques can help.  General instructions    Pay attention to any changes in your symptoms. Tell your doctor about them or any new symptoms.  Avoid any activities that cause chest pain.  Keep all follow-up visits as told by your doctor. This is important. You may need more testing if your chest pain does not go away.  Contact a doctor if:  Your chest pain does not go away.  You feel depressed.  You have a fever.  Get help right away if:  Your chest pain is worse.  You have a cough that gets worse, or you cough up blood.  You have very bad (severe) pain in your belly (abdomen).  You pass out (faint).  You have either of these for no clear reason:  Sudden chest discomfort.  Sudden discomfort in your arms, back, neck, or jaw.  You have shortness of breath at any time.  You suddenly start to sweat, or your skin gets clammy.  You feel sick to your stomach (nauseous).  You throw up (vomit).  You suddenly feel lightheaded or dizzy.  You feel very weak or tired.  Your heart starts to beat fast, or it feels like it is skipping beats.  These symptoms may be an emergency. Do not wait to see if the symptoms will go away. Get medical help right away. Call your local emergency services (911 in the [place] Do not drive yourself to the hospital.    Summary  Chest pain can be caused by many different conditions. The cause may be serious and need treatment right away. If you have chest pain, see your doctor right away.  Follow your doctor's instructions for taking medicines and making lifestyle changes.  Keep all follow-up visits as told by your doctor. This includes visits for any further testing if your chest pain does not go away.  Be sure to know the signs that show that your condition has become worse. Get help right away if you have these symptoms.  This information is not intended to replace advice given to you by your health care provider. Make sure you discuss any questions you have with your health care provider.    Document Revised: 11/02/2023 Document Reviewed: 11/02/2023  [employer] Patient Education © 2023 [employer]  [employer] logo  Terms and Conditions  Privacy Policy  Editorial Policy  All content on this site: Copyright © 2023 [employer], its lice Follow up with Dr. Sutherland this week.  Follow up with retinal specialist as scheduled.    Nonspecific Chest Pain  Chest pain can be caused by many different conditions. Some causes of chest pain can be life-threatening. These will require treatment right away. Serious causes of chest pain include:  Heart attack.  A tear in the body's main blood vessel.  Redness and swelling (inflammation) around your heart.  Blood clot in your lungs.  Other causes of chest pain may not be so serious. These include:  Heartburn.  Anxiety or stress.  Damage to bones or muscles in your chest.  Lung infections.  Chest pain can feel like:  Pain or discomfort in your chest.  Crushing, pressure, aching, or squeezing pain.  Burning or tingling.  Dull or sharp pain that is worse when you move, cough, or take a deep breath.  Pain or discomfort that is also felt in your back, neck, jaw, shoulder, or arm, or pain that spreads to any of these areas.  It is hard to know whether your pain is caused by something that is serious or something that is not so serious. So it is important to see your doctor right away if you have chest pain.    Follow these instructions at home:  Medicines    Take over-the-counter and prescription medicines only as told by your doctor.  If you were prescribed an antibiotic medicine, take it as told by your doctor. Do not stop taking the antibiotic even if you start to feel better.  Lifestyle    A plate along with examples of foods in a healthy diet.  Rest as told by your doctor.  Do not use any products that contain nicotine or tobacco, such as cigarettes, e-cigarettes, and chewing tobacco. If you need help quitting, ask your doctor.  Do not drink alcohol.  Make lifestyle changes as told by your doctor. These may include:  Getting regular exercise. Ask your doctor what activities are safe for you.  Eating a heart-healthy diet. A diet and nutrition specialist (dietitian) can help you to learn healthy eating options.  Staying at a healthy weight.  Treating diabetes or high blood pressure, if needed.  Lowering your stress. Activities such as yoga and relaxation techniques can help.  General instructions    Pay attention to any changes in your symptoms. Tell your doctor about them or any new symptoms.  Avoid any activities that cause chest pain.  Keep all follow-up visits as told by your doctor. This is important. You may need more testing if your chest pain does not go away.  Contact a doctor if:  Your chest pain does not go away.  You feel depressed.  You have a fever.  Get help right away if:  Your chest pain is worse.  You have a cough that gets worse, or you cough up blood.  You have very bad (severe) pain in your belly (abdomen).  You pass out (faint).  You have either of these for no clear reason:  Sudden chest discomfort.  Sudden discomfort in your arms, back, neck, or jaw.  You have shortness of breath at any time.  You suddenly start to sweat, or your skin gets clammy.  You feel sick to your stomach (nauseous).  You throw up (vomit).  You suddenly feel lightheaded or dizzy.  You feel very weak or tired.  Your heart starts to beat fast, or it feels like it is skipping beats.  These symptoms may be an emergency. Do not wait to see if the symptoms will go away. Get medical help right away. Call your local emergency services (911 in the [place] Do not drive yourself to the hospital.    Summary  Chest pain can be caused by many different conditions. The cause may be serious and need treatment right away. If you have chest pain, see your doctor right away.  Follow your doctor's instructions for taking medicines and making lifestyle changes.  Keep all follow-up visits as told by your doctor. This includes visits for any further testing if your chest pain does not go away.  Be sure to know the signs that show that your condition has become worse. Get help right away if you have these symptoms.  This information is not intended to replace advice given to you by your health care provider. Make sure you discuss any questions you have with your health care provider.    Document Revised: 11/02/2023 Document Reviewed: 11/02/2023  [employer] Patient Education © 2023 [employer]  [employer] logo  Terms and Conditions  Privacy Policy  Editorial Policy  All content on this site: Copyright © 2023 [employer], its lice

## 2023-12-18 NOTE — ED PROVIDER NOTE - CLINICAL SUMMARY MEDICAL DECISION MAKING FREE TEXT BOX
Pt with headache, chest pain ,dyspnea, vitals with bp 90s - baseline for pt, no neuro deficits, ekg unchanged, trop x 2 stable  Discussed with Dr. Sutherland - well known to patient  Feels symptoms secondary to PACs  Will see pt in office this week and discuss med change

## 2023-12-18 NOTE — ED PROVIDER NOTE - NEUROLOGICAL, MLM
Alert & Oriented x 3. CN II-XII intact. No facial droop. Clear speech. AGUILAR w/ 5/5 strength x 4 ext. Normal sensation. No pronator drift. No dysdidokinesia nor dysmetria. Normal heel-to-shin.

## 2023-12-18 NOTE — ED PROVIDER NOTE - PATIENT PORTAL LINK FT
You can access the FollowMyHealth Patient Portal offered by [facility] by registering at the following website: [email or web address]. By joining [employer]’s FollowMyHealth portal, you will also be able to view your health information using other applications (apps) compatible with our system. You can access the FollowMyHealth Patient Portal offered by [facility] by registering at the following website: [email or web address]. By joining [employer]’s FollowMyHealth portal, you will also be able to view your health information using other applications (apps) compatible with our system.

## 2023-12-18 NOTE — ED ADULT TRIAGE NOTE - CHIEF COMPLAINT QUOTE
+Headache with CP and SOB since last night, headache since 630p last night  PMH stents x 2 last was a few mos ago, htn, dm2  From home with daughters, amb with walker

## 2024-01-02 ENCOUNTER — NON-APPOINTMENT (OUTPATIENT)
Age: 89
End: 2024-01-02

## 2024-01-04 ENCOUNTER — INPATIENT (INPATIENT)
Facility: HOSPITAL | Age: 89
LOS: 1 days | Discharge: HOME | DRG: 287 | End: 2024-01-06
Attending: STUDENT IN AN ORGANIZED HEALTH CARE EDUCATION/TRAINING PROGRAM | Admitting: STUDENT IN AN ORGANIZED HEALTH CARE EDUCATION/TRAINING PROGRAM
Payer: MEDICARE

## 2024-01-04 ENCOUNTER — NON-APPOINTMENT (OUTPATIENT)
Age: 89
End: 2024-01-04

## 2024-01-04 ENCOUNTER — APPOINTMENT (OUTPATIENT)
Dept: HEART AND VASCULAR | Facility: CLINIC | Age: 89
End: 2024-01-04
Payer: MEDICARE

## 2024-01-04 VITALS
HEART RATE: 51 BPM | BODY MASS INDEX: 20.47 KG/M2 | TEMPERATURE: 98.2 F | OXYGEN SATURATION: 99 % | HEIGHT: 56 IN | DIASTOLIC BLOOD PRESSURE: 60 MMHG | WEIGHT: 91 LBS | SYSTOLIC BLOOD PRESSURE: 100 MMHG

## 2024-01-04 VITALS
DIASTOLIC BLOOD PRESSURE: 66 MMHG | HEART RATE: 79 BPM | RESPIRATION RATE: 20 BRPM | TEMPERATURE: 98 F | SYSTOLIC BLOOD PRESSURE: 133 MMHG | OXYGEN SATURATION: 97 % | WEIGHT: 91.93 LBS

## 2024-01-04 DIAGNOSIS — I20.0 UNSTABLE ANGINA: ICD-10-CM

## 2024-01-04 DIAGNOSIS — E11.9 TYPE 2 DIABETES MELLITUS WITHOUT COMPLICATIONS: ICD-10-CM

## 2024-01-04 DIAGNOSIS — I50.22 CHRONIC SYSTOLIC (CONGESTIVE) HEART FAILURE: ICD-10-CM

## 2024-01-04 DIAGNOSIS — Z95.5 PRESENCE OF CORONARY ANGIOPLASTY IMPLANT AND GRAFT: Chronic | ICD-10-CM

## 2024-01-04 DIAGNOSIS — I10 ESSENTIAL (PRIMARY) HYPERTENSION: ICD-10-CM

## 2024-01-04 DIAGNOSIS — Z90.49 ACQUIRED ABSENCE OF OTHER SPECIFIED PARTS OF DIGESTIVE TRACT: Chronic | ICD-10-CM

## 2024-01-04 DIAGNOSIS — E03.9 HYPOTHYROIDISM, UNSPECIFIED: ICD-10-CM

## 2024-01-04 DIAGNOSIS — E78.5 HYPERLIPIDEMIA, UNSPECIFIED: ICD-10-CM

## 2024-01-04 LAB
ALBUMIN SERPL ELPH-MCNC: 3.1 G/DL — LOW (ref 3.3–5)
ALBUMIN SERPL ELPH-MCNC: 3.1 G/DL — LOW (ref 3.3–5)
ALP SERPL-CCNC: 81 U/L — SIGNIFICANT CHANGE UP (ref 40–120)
ALP SERPL-CCNC: 81 U/L — SIGNIFICANT CHANGE UP (ref 40–120)
ALT FLD-CCNC: SIGNIFICANT CHANGE UP U/L (ref 10–45)
ALT FLD-CCNC: SIGNIFICANT CHANGE UP U/L (ref 10–45)
ANION GAP SERPL CALC-SCNC: 7 MMOL/L — SIGNIFICANT CHANGE UP (ref 5–17)
ANION GAP SERPL CALC-SCNC: 7 MMOL/L — SIGNIFICANT CHANGE UP (ref 5–17)
ANION GAP SERPL CALC-SCNC: 8 MMOL/L — SIGNIFICANT CHANGE UP (ref 5–17)
ANION GAP SERPL CALC-SCNC: 8 MMOL/L — SIGNIFICANT CHANGE UP (ref 5–17)
APTT BLD: 28.5 SEC — SIGNIFICANT CHANGE UP (ref 24.5–35.6)
APTT BLD: 28.5 SEC — SIGNIFICANT CHANGE UP (ref 24.5–35.6)
AST SERPL-CCNC: SIGNIFICANT CHANGE UP U/L (ref 10–40)
AST SERPL-CCNC: SIGNIFICANT CHANGE UP U/L (ref 10–40)
BASOPHILS # BLD AUTO: 0.05 K/UL — SIGNIFICANT CHANGE UP (ref 0–0.2)
BASOPHILS # BLD AUTO: 0.05 K/UL — SIGNIFICANT CHANGE UP (ref 0–0.2)
BASOPHILS NFR BLD AUTO: 0.5 % — SIGNIFICANT CHANGE UP (ref 0–2)
BASOPHILS NFR BLD AUTO: 0.5 % — SIGNIFICANT CHANGE UP (ref 0–2)
BILIRUB SERPL-MCNC: 0.3 MG/DL — SIGNIFICANT CHANGE UP (ref 0.2–1.2)
BILIRUB SERPL-MCNC: 0.3 MG/DL — SIGNIFICANT CHANGE UP (ref 0.2–1.2)
BUN SERPL-MCNC: 30 MG/DL — HIGH (ref 7–23)
BUN SERPL-MCNC: 30 MG/DL — HIGH (ref 7–23)
BUN SERPL-MCNC: 33 MG/DL — HIGH (ref 7–23)
BUN SERPL-MCNC: 33 MG/DL — HIGH (ref 7–23)
CALCIUM SERPL-MCNC: 8.7 MG/DL — SIGNIFICANT CHANGE UP (ref 8.4–10.5)
CHLORIDE SERPL-SCNC: 102 MMOL/L — SIGNIFICANT CHANGE UP (ref 96–108)
CHLORIDE SERPL-SCNC: 102 MMOL/L — SIGNIFICANT CHANGE UP (ref 96–108)
CHLORIDE SERPL-SCNC: 107 MMOL/L — SIGNIFICANT CHANGE UP (ref 96–108)
CHLORIDE SERPL-SCNC: 107 MMOL/L — SIGNIFICANT CHANGE UP (ref 96–108)
CK MB CFR SERPL CALC: 3 NG/ML — SIGNIFICANT CHANGE UP (ref 0–6.7)
CK MB CFR SERPL CALC: 3 NG/ML — SIGNIFICANT CHANGE UP (ref 0–6.7)
CK MB CFR SERPL CALC: 3.2 NG/ML — SIGNIFICANT CHANGE UP (ref 0–6.7)
CK MB CFR SERPL CALC: 3.2 NG/ML — SIGNIFICANT CHANGE UP (ref 0–6.7)
CK SERPL-CCNC: 224 U/L — HIGH (ref 25–170)
CK SERPL-CCNC: 224 U/L — HIGH (ref 25–170)
CK SERPL-CCNC: 92 U/L — SIGNIFICANT CHANGE UP (ref 25–170)
CK SERPL-CCNC: 92 U/L — SIGNIFICANT CHANGE UP (ref 25–170)
CO2 SERPL-SCNC: 19 MMOL/L — LOW (ref 22–31)
CO2 SERPL-SCNC: 19 MMOL/L — LOW (ref 22–31)
CO2 SERPL-SCNC: 21 MMOL/L — LOW (ref 22–31)
CO2 SERPL-SCNC: 21 MMOL/L — LOW (ref 22–31)
CREAT SERPL-MCNC: 0.55 MG/DL — SIGNIFICANT CHANGE UP (ref 0.5–1.3)
CREAT SERPL-MCNC: 0.55 MG/DL — SIGNIFICANT CHANGE UP (ref 0.5–1.3)
CREAT SERPL-MCNC: 0.68 MG/DL — SIGNIFICANT CHANGE UP (ref 0.5–1.3)
CREAT SERPL-MCNC: 0.68 MG/DL — SIGNIFICANT CHANGE UP (ref 0.5–1.3)
EGFR: 84 ML/MIN/1.73M2 — SIGNIFICANT CHANGE UP
EGFR: 84 ML/MIN/1.73M2 — SIGNIFICANT CHANGE UP
EGFR: 88 ML/MIN/1.73M2 — SIGNIFICANT CHANGE UP
EGFR: 88 ML/MIN/1.73M2 — SIGNIFICANT CHANGE UP
EOSINOPHIL # BLD AUTO: 0.16 K/UL — SIGNIFICANT CHANGE UP (ref 0–0.5)
EOSINOPHIL # BLD AUTO: 0.16 K/UL — SIGNIFICANT CHANGE UP (ref 0–0.5)
EOSINOPHIL NFR BLD AUTO: 1.5 % — SIGNIFICANT CHANGE UP (ref 0–6)
EOSINOPHIL NFR BLD AUTO: 1.5 % — SIGNIFICANT CHANGE UP (ref 0–6)
GLUCOSE SERPL-MCNC: 313 MG/DL — HIGH (ref 70–99)
GLUCOSE SERPL-MCNC: 313 MG/DL — HIGH (ref 70–99)
GLUCOSE SERPL-MCNC: 394 MG/DL — HIGH (ref 70–99)
GLUCOSE SERPL-MCNC: 394 MG/DL — HIGH (ref 70–99)
HCT VFR BLD CALC: 35 % — SIGNIFICANT CHANGE UP (ref 34.5–45)
HCT VFR BLD CALC: 35 % — SIGNIFICANT CHANGE UP (ref 34.5–45)
HGB BLD-MCNC: 12 G/DL — SIGNIFICANT CHANGE UP (ref 11.5–15.5)
HGB BLD-MCNC: 12 G/DL — SIGNIFICANT CHANGE UP (ref 11.5–15.5)
IMM GRANULOCYTES NFR BLD AUTO: 0.3 % — SIGNIFICANT CHANGE UP (ref 0–0.9)
IMM GRANULOCYTES NFR BLD AUTO: 0.3 % — SIGNIFICANT CHANGE UP (ref 0–0.9)
INR BLD: 0.95 — SIGNIFICANT CHANGE UP (ref 0.85–1.18)
INR BLD: 0.95 — SIGNIFICANT CHANGE UP (ref 0.85–1.18)
LIDOCAIN IGE QN: 19 U/L — SIGNIFICANT CHANGE UP (ref 7–60)
LIDOCAIN IGE QN: 19 U/L — SIGNIFICANT CHANGE UP (ref 7–60)
LYMPHOCYTES # BLD AUTO: 1.66 K/UL — SIGNIFICANT CHANGE UP (ref 1–3.3)
LYMPHOCYTES # BLD AUTO: 1.66 K/UL — SIGNIFICANT CHANGE UP (ref 1–3.3)
LYMPHOCYTES # BLD AUTO: 16 % — SIGNIFICANT CHANGE UP (ref 13–44)
LYMPHOCYTES # BLD AUTO: 16 % — SIGNIFICANT CHANGE UP (ref 13–44)
MCHC RBC-ENTMCNC: 31.4 PG — SIGNIFICANT CHANGE UP (ref 27–34)
MCHC RBC-ENTMCNC: 31.4 PG — SIGNIFICANT CHANGE UP (ref 27–34)
MCHC RBC-ENTMCNC: 34.3 GM/DL — SIGNIFICANT CHANGE UP (ref 32–36)
MCHC RBC-ENTMCNC: 34.3 GM/DL — SIGNIFICANT CHANGE UP (ref 32–36)
MCV RBC AUTO: 91.6 FL — SIGNIFICANT CHANGE UP (ref 80–100)
MCV RBC AUTO: 91.6 FL — SIGNIFICANT CHANGE UP (ref 80–100)
MONOCYTES # BLD AUTO: 0.61 K/UL — SIGNIFICANT CHANGE UP (ref 0–0.9)
MONOCYTES # BLD AUTO: 0.61 K/UL — SIGNIFICANT CHANGE UP (ref 0–0.9)
MONOCYTES NFR BLD AUTO: 5.9 % — SIGNIFICANT CHANGE UP (ref 2–14)
MONOCYTES NFR BLD AUTO: 5.9 % — SIGNIFICANT CHANGE UP (ref 2–14)
NEUTROPHILS # BLD AUTO: 7.87 K/UL — HIGH (ref 1.8–7.4)
NEUTROPHILS # BLD AUTO: 7.87 K/UL — HIGH (ref 1.8–7.4)
NEUTROPHILS NFR BLD AUTO: 75.8 % — SIGNIFICANT CHANGE UP (ref 43–77)
NEUTROPHILS NFR BLD AUTO: 75.8 % — SIGNIFICANT CHANGE UP (ref 43–77)
NRBC # BLD: 0 /100 WBCS — SIGNIFICANT CHANGE UP (ref 0–0)
NRBC # BLD: 0 /100 WBCS — SIGNIFICANT CHANGE UP (ref 0–0)
PLATELET # BLD AUTO: 293 K/UL — SIGNIFICANT CHANGE UP (ref 150–400)
PLATELET # BLD AUTO: 293 K/UL — SIGNIFICANT CHANGE UP (ref 150–400)
POTASSIUM SERPL-MCNC: SIGNIFICANT CHANGE UP MMOL/L (ref 3.5–5.3)
POTASSIUM SERPL-SCNC: SIGNIFICANT CHANGE UP MMOL/L (ref 3.5–5.3)
PROT SERPL-MCNC: 6.5 G/DL — SIGNIFICANT CHANGE UP (ref 6–8.3)
PROT SERPL-MCNC: 6.5 G/DL — SIGNIFICANT CHANGE UP (ref 6–8.3)
PROTHROM AB SERPL-ACNC: 10.8 SEC — SIGNIFICANT CHANGE UP (ref 9.5–13)
PROTHROM AB SERPL-ACNC: 10.8 SEC — SIGNIFICANT CHANGE UP (ref 9.5–13)
RBC # BLD: 3.82 M/UL — SIGNIFICANT CHANGE UP (ref 3.8–5.2)
RBC # BLD: 3.82 M/UL — SIGNIFICANT CHANGE UP (ref 3.8–5.2)
RBC # FLD: 15.5 % — HIGH (ref 10.3–14.5)
RBC # FLD: 15.5 % — HIGH (ref 10.3–14.5)
SODIUM SERPL-SCNC: 130 MMOL/L — LOW (ref 135–145)
SODIUM SERPL-SCNC: 130 MMOL/L — LOW (ref 135–145)
SODIUM SERPL-SCNC: 134 MMOL/L — LOW (ref 135–145)
SODIUM SERPL-SCNC: 134 MMOL/L — LOW (ref 135–145)
TROPONIN T, HIGH SENSITIVITY RESULT: 20 NG/L — SIGNIFICANT CHANGE UP (ref 0–51)
TROPONIN T, HIGH SENSITIVITY RESULT: 20 NG/L — SIGNIFICANT CHANGE UP (ref 0–51)
TROPONIN T, HIGH SENSITIVITY RESULT: 22 NG/L — SIGNIFICANT CHANGE UP (ref 0–51)
TROPONIN T, HIGH SENSITIVITY RESULT: 22 NG/L — SIGNIFICANT CHANGE UP (ref 0–51)
WBC # BLD: 10.38 K/UL — SIGNIFICANT CHANGE UP (ref 3.8–10.5)
WBC # BLD: 10.38 K/UL — SIGNIFICANT CHANGE UP (ref 3.8–10.5)
WBC # FLD AUTO: 10.38 K/UL — SIGNIFICANT CHANGE UP (ref 3.8–10.5)
WBC # FLD AUTO: 10.38 K/UL — SIGNIFICANT CHANGE UP (ref 3.8–10.5)

## 2024-01-04 PROCEDURE — 99214 OFFICE O/P EST MOD 30 MIN: CPT | Mod: 25

## 2024-01-04 PROCEDURE — 99223 1ST HOSP IP/OBS HIGH 75: CPT

## 2024-01-04 PROCEDURE — 99285 EMERGENCY DEPT VISIT HI MDM: CPT

## 2024-01-04 PROCEDURE — 71045 X-RAY EXAM CHEST 1 VIEW: CPT | Mod: 26

## 2024-01-04 PROCEDURE — 93000 ELECTROCARDIOGRAM COMPLETE: CPT

## 2024-01-04 RX ORDER — CLOPIDOGREL BISULFATE 75 MG/1
75 TABLET, FILM COATED ORAL DAILY
Refills: 0 | Status: DISCONTINUED | OUTPATIENT
Start: 2024-01-04 | End: 2024-01-06

## 2024-01-04 RX ORDER — METOPROLOL TARTRATE 50 MG
25 TABLET ORAL DAILY
Refills: 0 | Status: DISCONTINUED | OUTPATIENT
Start: 2024-01-05 | End: 2024-01-06

## 2024-01-04 RX ORDER — INSULIN GLARGINE 100 [IU]/ML
8 INJECTION, SOLUTION SUBCUTANEOUS AT BEDTIME
Refills: 0 | Status: DISCONTINUED | OUTPATIENT
Start: 2024-01-05 | End: 2024-01-06

## 2024-01-04 RX ORDER — ISOSORBIDE MONONITRATE 60 MG/1
30 TABLET, EXTENDED RELEASE ORAL ONCE
Refills: 0 | Status: COMPLETED | OUTPATIENT
Start: 2024-01-04 | End: 2024-01-04

## 2024-01-04 RX ORDER — INSULIN LISPRO 100/ML
2 VIAL (ML) SUBCUTANEOUS
Refills: 0 | Status: DISCONTINUED | OUTPATIENT
Start: 2024-01-05 | End: 2024-01-05

## 2024-01-04 RX ORDER — ASPIRIN/CALCIUM CARB/MAGNESIUM 324 MG
81 TABLET ORAL DAILY
Refills: 0 | Status: DISCONTINUED | OUTPATIENT
Start: 2024-01-05 | End: 2024-01-06

## 2024-01-04 RX ORDER — ISOSORBIDE MONONITRATE 60 MG/1
60 TABLET, EXTENDED RELEASE ORAL DAILY
Refills: 0 | Status: DISCONTINUED | OUTPATIENT
Start: 2024-01-05 | End: 2024-01-06

## 2024-01-04 RX ORDER — PANTOPRAZOLE SODIUM 20 MG/1
40 TABLET, DELAYED RELEASE ORAL
Refills: 0 | Status: DISCONTINUED | OUTPATIENT
Start: 2024-01-04 | End: 2024-01-06

## 2024-01-04 RX ORDER — METOPROLOL TARTRATE 50 MG
25 TABLET ORAL ONCE
Refills: 0 | Status: COMPLETED | OUTPATIENT
Start: 2024-01-04 | End: 2024-01-04

## 2024-01-04 RX ORDER — DAPAGLIFLOZIN 10 MG/1
10 TABLET, FILM COATED ORAL EVERY 24 HOURS
Refills: 0 | Status: DISCONTINUED | OUTPATIENT
Start: 2024-01-05 | End: 2024-01-06

## 2024-01-04 RX ORDER — INSULIN LISPRO 100/ML
VIAL (ML) SUBCUTANEOUS
Refills: 0 | Status: DISCONTINUED | OUTPATIENT
Start: 2024-01-04 | End: 2024-01-06

## 2024-01-04 RX ORDER — LEVOTHYROXINE SODIUM 125 MCG
50 TABLET ORAL DAILY
Refills: 0 | Status: DISCONTINUED | OUTPATIENT
Start: 2024-01-05 | End: 2024-01-06

## 2024-01-04 RX ORDER — ATORVASTATIN CALCIUM 80 MG/1
40 TABLET, FILM COATED ORAL AT BEDTIME
Refills: 0 | Status: DISCONTINUED | OUTPATIENT
Start: 2024-01-04 | End: 2024-01-06

## 2024-01-04 RX ORDER — SODIUM CHLORIDE 9 MG/ML
1000 INJECTION INTRAMUSCULAR; INTRAVENOUS; SUBCUTANEOUS
Refills: 0 | Status: DISCONTINUED | OUTPATIENT
Start: 2024-01-04 | End: 2024-01-05

## 2024-01-04 RX ADMIN — CLOPIDOGREL BISULFATE 75 MILLIGRAM(S): 75 TABLET, FILM COATED ORAL at 22:11

## 2024-01-04 RX ADMIN — Medication 25 MILLIGRAM(S): at 23:16

## 2024-01-04 RX ADMIN — ISOSORBIDE MONONITRATE 30 MILLIGRAM(S): 60 TABLET, EXTENDED RELEASE ORAL at 23:16

## 2024-01-04 RX ADMIN — SODIUM CHLORIDE 75 MILLILITER(S): 9 INJECTION INTRAMUSCULAR; INTRAVENOUS; SUBCUTANEOUS at 22:11

## 2024-01-04 NOTE — H&P ADULT - PROBLEM SELECTOR PLAN 2
euvolemic, warm and HD stable  - Repeat TTE in AM  - Continue Losartan 50mg QD, Toprol 25mg QD and Farxiga 10mg QD  - Core measure, daily weight and strict I&Os euvolemic, warm and HD stable  - Repeat TTE in AM  - GDMT - Continue Toprol 25mg QD and Farxiga 10mg QD. ACE/ARB d/c by outpt for hypotension  - Core measure, daily weight and strict I&Os euvolemic, warm and HD stable  - Repeat TTE in AM  - GDMT - Continue Toprol 25mg QD, Imdur 60mg QD and Farxiga 10mg QD. ACE/ARB was d/c by outpt for hypotension  - Core measure, daily weight and strict I&Os

## 2024-01-04 NOTE — ED PROVIDER NOTE - OBJECTIVE STATEMENT
89 yo F w PMH of CAD s/p PCI OM1 7/31/23 of mid LAD, DM, HTN, HLD, hypothyroidism, mid LV dysfunction EF 49%, galucoma, referred in by cardiologist for unstable angina. Over the past several days, patient has been having intermittent nonexertional chest pain that is substernal, nonradiating, nonpleuritic. It responds to aspirin and Imdur. Patient took 2 aspirin earlier today. No shortness of breath, leg swelling, cough, hemoptysis, exogenous estrogen, recent travel, surgery, malignancy, personal or FHx of VTE. 87 yo F w PMH of CAD s/p PCI OM1 7/31/23 of mid LAD, DM, HTN, HLD, hypothyroidism, mid LV dysfunction EF 49%, galucoma, referred in by cardiologist for unstable angina. Over the past several days, patient has been having intermittent nonexertional chest pain that is substernal, nonradiating, nonpleuritic. It responds to aspirin and Imdur. Patient took 2 aspirin earlier today. No shortness of breath, leg swelling, cough, hemoptysis, exogenous estrogen, recent travel, surgery, malignancy, personal or FHx of VTE.

## 2024-01-04 NOTE — ED ADULT NURSE NOTE - NSFALLHARMRISKINTERV_ED_ALL_ED
Assistance OOB with selected safe patient handling equipment if applicable/Assistance with ambulation/Communicate risk of Fall with Harm to all staff, patient, and family/Monitor gait and stability/Provide patient with walking aids/Provide visual cue: red socks, yellow wristband, yellow gown, etc/Reinforce activity limits and safety measures with patient and family/Bed in lowest position, wheels locked, appropriate side rails in place/Call bell, personal items and telephone in reach/Instruct patient to call for assistance before getting out of bed/chair/stretcher/Non-slip footwear applied when patient is off stretcher/[place] to call system/Physically safe environment - no spills, clutter or unnecessary equipment/Purposeful Proactive Rounding/Room/bathroom lighting operational, light cord in reach Assistance OOB with selected safe patient handling equipment if applicable/Assistance with ambulation/Communicate risk of Fall with Harm to all staff, patient, and family/Monitor gait and stability/Provide patient with walking aids/Provide visual cue: red socks, yellow wristband, yellow gown, etc/Reinforce activity limits and safety measures with patient and family/Bed in lowest position, wheels locked, appropriate side rails in place/Call bell, personal items and telephone in reach/Instruct patient to call for assistance before getting out of bed/chair/stretcher/Non-slip footwear applied when patient is off stretcher/[place] to call system/Physically safe environment - no spills, clutter or unnecessary equipment/Purposeful Proactive Rounding/Room/bathroom lighting operational, light cord in reach

## 2024-01-04 NOTE — ED PROVIDER NOTE - CLINICAL SUMMARY MEDICAL DECISION MAKING FREE TEXT BOX
Concerned for unstable angina, possible ACS. ECG shows TW flattening in inf leads. Low suspicion for acute PE (Wells low risk ), PTX, aortic dissection, cardiac effusion/tamponade. Will most likely admit for inpatient risk stratification and possible stress testing with Cardiology.    Plan: Cardiac monitor, EKG, trop, CXR, Cardiology admit

## 2024-01-04 NOTE — H&P ADULT - ASSESSMENT
87F, Lao speaking, [place], w/ PMHx of HTN, HLD, CAD s/p multiple PCIs (most recently 7/2023 w/ MARTINA LAD and MARTINA OM1), HFmEF (LVEF 49%), DM-II, Hypothyroidism, PAD s/p ? PTA, h/o urinary retention c/b UTIs, and balance d/o, presented to [facility] ED c/o substernal CP at rest, now admitted to cardiac tele for further  management of unstable angina. 87F, English speaking, [place], w/ PMHx of HTN, HLD, CAD s/p multiple PCIs (most recently 7/2023 w/ MARTINA LAD and MARTINA OM1), HFmEF (LVEF 49%), DM-II, Hypothyroidism, PAD s/p ? PTA, h/o urinary retention c/b UTIs, and balance d/o, presented to [facility] ED c/o substernal CP at rest, now admitted to cardiac tele for further  management of unstable angina. 87F, Icelandic speaking, [place], w/ PMHx of HTN, HLD, CAD s/p multiple PCIs (most recently 7/2023 w/ MARTINA pOM1 and scoreflex mLAD ISR), HFmEF (LVEF 49%), DM-II, Hypothyroidism, PAD s/p ? PTA, h/o urinary retention c/b UTIs, and balance d/o, presented to [facility] ED c/o substernal CP at rest, now admitted to cardiac tele for further  management of unstable angina. 87F, French speaking, [place], w/ PMHx of HTN, HLD, CAD s/p multiple PCIs (most recently 7/2023 w/ MARTINA pOM1 and scoreflex mLAD ISR), HFmEF (LVEF 49%), DM-II, Hypothyroidism, PAD s/p ? PTA, h/o urinary retention c/b UTIs, and balance d/o, presented to [facility] ED c/o substernal CP at rest, now admitted to cardiac tele for further  management of unstable angina. 87F, Greenlandic speaking, [place], w/ PMHx of HTN, HLD, CAD s/p multiple PCIs (most recently 7/2023 w/ MARTINA LAD and MARTINA OM1), HFmEF (LVEF 49%), DM-II, Hypothyroidism, PAD s/p ? PTA, h/o urinary retention c/b UTIs, balance d/o, and recent hospitalization at [facility] for COVID and CP (found to have new WMA in basal inferior wall), was referred to [facility] ED by outpt cardiologist for intermittent substernal CP at rest, pt now admitted to cardiac tele for further management of unstable angina. 87F, Hebrew speaking, [place], w/ PMHx of HTN, HLD, CAD s/p multiple PCIs (most recently 7/2023 w/ MARTINA LAD and MARTINA OM1), HFmEF (LVEF 49%), DM-II, Hypothyroidism, PAD s/p ? PTA, h/o urinary retention c/b UTIs, balance d/o, and recent hospitalization at [facility] for COVID and CP (found to have new WMA in basal inferior wall), was referred to [facility] ED by outpt cardiologist for intermittent substernal CP at rest, pt now admitted to cardiac tele for further management of unstable angina. 87F, Greenlandic speaking, [place], w/ PMHx of HTN, HLD, CAD s/p multiple PCIs (most recently 7/2023 w/ MARTINA pOM1 and PTCA mLAD ISR), HFmEF (LVEF 49%), DM-II, Hypothyroidism, PAD s/p ? PTA, h/o urinary retention c/b UTIs, balance d/o, and recent hospitalization at [facility] for COVID and CP (found to have new WMA in basal inferior wall), was referred to [facility] ED by outpt cardiologist for intermittent substernal CP at rest, pt now admitted to cardiac tele for further management of unstable angina. 87F, Divehi speaking, [place], w/ PMHx of HTN, HLD, CAD s/p multiple PCIs (most recently 7/2023 w/ MARTINA pOM1 and PTCA mLAD ISR), HFmEF (LVEF 49%), DM-II, Hypothyroidism, PAD s/p ? PTA, h/o urinary retention c/b UTIs, balance d/o, and recent hospitalization at [facility] for COVID and CP (found to have new WMA in basal inferior wall), was referred to [facility] ED by outpt cardiologist for intermittent substernal CP at rest, pt now admitted to cardiac tele for further management of unstable angina. 87F, Kyrgyz speaking, [place], w/ PMHx of HTN, HLD, CAD s/p multiple PCIs (most recently 7/2023 w/ MARTINA pOM1 and PTCA mLAD ISR), HFmEF (LVEF 49%), DM-II, Hypothyroidism, PAD s/p ? PTA, Glaucoma c/b L eye blindness, h/o urinary retention c/b UTIs, balance d/o, and recent hospitalization at [facility] for COVID and CP (found to have new WMA in basal inferior wall), was referred to [facility] ED by outpt cardiologist for intermittent substernal CP at rest, pt now admitted to cardiac tele for further management of unstable angina. 87F, Malay speaking, [place], w/ PMHx of HTN, HLD, CAD s/p multiple PCIs (most recently 7/2023 w/ MARTINA pOM1 and PTCA mLAD ISR), HFmEF (LVEF 49%), DM-II, Hypothyroidism, PAD s/p ? PTA, Glaucoma c/b L eye blindness, h/o urinary retention c/b UTIs, balance d/o, and recent hospitalization at [facility] for COVID and CP (found to have new WMA in basal inferior wall), was referred to [facility] ED by outpt cardiologist for intermittent substernal CP at rest, pt now admitted to cardiac tele for further management of unstable angina.

## 2024-01-04 NOTE — H&P ADULT - NSHPLABSRESULTS_GEN_ALL_CORE
12.0   10.38 )-----------( 293      ( 04 Jan 2024 18:29 )             35.0       01-04    134<L>  |  107  |  30<H>  ----------------------------<  394<H>  see note   |  19<L>  |  0.68    Ca    8.7      04 Jan 2024 21:01    TPro  6.5  /  Alb  3.1<L>  /  TBili  0.3  /  DBili  x   /  AST  see note  /  ALT  see note  /  AlkPhos  81  01-04      PT/INR - ( 04 Jan 2024 18:29 )   PT: 10.8 sec;   INR: 0.95          PTT - ( 04 Jan 2024 18:29 )  PTT:28.5 sec    CARDIAC MARKERS ( 04 Jan 2024 21:01 )  x     / x     / 92 U/L / x     / 3.0 ng/mL  CARDIAC MARKERS ( 04 Jan 2024 18:29 )  x     / x     / 224 U/L / x     / 3.2 ng/mL        Urinalysis Basic - ( 04 Jan 2024 21:01 )    Color: x / Appearance: x / SG: x / pH: x  Gluc: 394 mg/dL / Ketone: x  / Bili: x / Urobili: x   Blood: x / Protein: x / Nitrite: x   Leuk Esterase: x / RBC: x / WBC x   Sq Epi: x / Non Sq Epi: x / Bacteria: x      EKG: NSR. non ischemic

## 2024-01-04 NOTE — H&P ADULT - PROBLEM SELECTOR PLAN 5
- f/u AM A1c  - Continue Lantus ___ , Lispro ___ and mISS - f/u AM A1c  - Continue Lantus 8u HS, Lispro 3u TID and FRANCESCA

## 2024-01-04 NOTE — H&P ADULT - CARDIOVASCULAR
normal/regular rate and rhythm/S1 S2 present/no gallops/no rub/no murmur/no JVD/normal PMI/no pedal edema normal/regular rate and rhythm/S1 S2 present/no gallops/no rub/no murmur/no JVD/normal PMI/no pedal edema/vascular

## 2024-01-04 NOTE — ED PROVIDER NOTE - PHYSICAL EXAMINATION
CONSTITUTIONAL: Non-toxic; in no apparent distress  HEAD: Normocephalic; atraumatic  EYES: PERRL; EOM intact   ENMT: External appears normal  NECK: Supple; non-tender  CARD: Normal S1, S2; no murmurs, rubs, or gallops  RESP: Normal chest excursion with respiration; breath sounds clear and equal bilaterally  ABD: Soft, non-distended; non-tender  EXT: Normal ROM in all four extremities; non-tender to palpation, no peripheral edema  SKIN: Warm, dry, no rash  NEURO:  No focal neurological deficiencies

## 2024-01-04 NOTE — ED ADULT NURSE NOTE - OBJECTIVE STATEMENT
88yF pmhx CAD, DM type 2, PAD, HTN presents to ER complaining of intermittent CP xweeks, pt has been expierencing chest pain multiple times a day, pt was seen by cardiologist today and was told to come to for eval and placement of stents. Was recently admitted for COVID-19 and CP has become more frequent since. Currently denies SOB/CP, N/V/D, C/F.

## 2024-01-04 NOTE — H&P ADULT - NS ATTEND AMEND GEN_ALL_CORE FT
88 yo lady PMHx of DM2, CAD s/p PCI, HF mildly reduced EF, PAD s/p prior procedures, and recent admission to the OSH Dec/2023 COVID-19 infection who was admitted for cardiac chest pain concerning for unstable angina.    Assessment  1. Hx of CAD s/p PCI with new cardiac chest pain concerning for unstable angina.  HsTrop negative  2. HF mildly reduced EF  3. PAD s/p prior procedures  4. DM2    Plan  1. Plan for [facility] today  2. DAPT w/ ASA 81mg QD and Plavix 75mg QD  3. C/w losartan 25mg PO QD, metoprolol succinate 25mg PO QD, Imdur 60mg QD, and Farxiga 10mg QD  4. High intensity statin  5. Obtain TTE    During non face-to-face time, I reviewed relevant portions of the patient’s medical record. During face-to-face time, I took a relevant history and examined the patient. I also explained differential diagnoses, relevant cardiac diagnoses, workup, and management plan, which required a high level of medical decision making. I answered all questions related to the patient's medical conditions.     Caitlyn Edwards M.D.  CARDIOLOGY ATTENDING 86 yo lady PMHx of DM2, CAD s/p PCI, HF mildly reduced EF, PAD s/p prior procedures, and recent admission to the OSH Dec/2023 COVID-19 infection who was admitted for cardiac chest pain concerning for unstable angina.    Assessment  1. Hx of CAD s/p PCI with new cardiac chest pain concerning for unstable angina.  HsTrop negative  2. HF mildly reduced EF  3. PAD s/p prior procedures  4. DM2    Plan  1. Plan for [facility] today  2. DAPT w/ ASA 81mg QD and Plavix 75mg QD  3. C/w losartan 25mg PO QD, metoprolol succinate 25mg PO QD, Imdur 60mg QD, and Farxiga 10mg QD  4. High intensity statin  5. Obtain TTE    During non face-to-face time, I reviewed relevant portions of the patient’s medical record. During face-to-face time, I took a relevant history and examined the patient. I also explained differential diagnoses, relevant cardiac diagnoses, workup, and management plan, which required a high level of medical decision making. I answered all questions related to the patient's medical conditions.     Caitlyn Edwards M.D.  CARDIOLOGY ATTENDING

## 2024-01-04 NOTE — H&P ADULT - PROBLEM SELECTOR PLAN 1
presents w/ substernal CP at rest, improved w/ ASA 81mg x 2, currently CP free and HD stable  - hsTropT 20, f/u repeat Trop and CK/CKMB  - EKG non ischemic  - TTE 5/ presents w/ substernal CP at rest, improved w/ ASA 81mg x 2, currently CP free and HD stable  - hsTropT 20, f/u repeat Trop and CK/CKMB  - EKG non ischemic  - TTE 5/18/23: LVEF 49%, mild-mod LVH, G1DD, LA/RA mod dilated, interatrial septum aneurysm, mild MR  - recent [facility] 7/14/23: MARTINA/Scoreflex pOM1 (80%), Scoreflex PTCA mLAD ISR (75%), pRCA 20%, RPDA mild diffuse  - NPO after MN for cardiac cath in AM, pt consented, precath IVF NS @ 75cc/hr x 12hrs o/n  - Continue ASA 81mg QD, Plavix 75mg QD, Lipitor 80mg HS, Imdur 30mg QD, and Toprol 25mg QD presents w/ substernal CP at rest, improved w/ ASA 81mg x 2, currently CP free and HD stable  - hsTropT 20, f/u repeat Trop and CK/CKMB  - EKG non ischemic  - TTE 5/18/23: LVEF 49%, mild-mod LVH, G1DD, LA/RA mod dilated, interatrial septum aneurysm, mild MR  - Per outpt cardiologist, pt was found to have new WMA in basal inferior walls at [facility], f/u repeat TTE in AM  - recent [facility] 7/14/23: MARTINA/Scoreflex pOM1 (80%), Scoreflex PTCA mLAD ISR (75%), pRCA 20%, RPDA mild diffuse  - NPO after MN for cardiac cath in AM, pt consented, precath IVF NS @ 75cc/hr x 12hrs o/n  - Continue ASA 81mg QD, Plavix 75mg QD, Lipitor 40mg HS, Imdur 30mg QD, and Toprol 25mg QD presents w/ substernal CP at rest, improved w/ ASA 81mg x 2, currently CP free and HD stable  - hsTropT 20, f/u repeat Trop and CK/CKMB  - EKG non ischemic  - TTE 5/18/23: LVEF 49%, mild-mod LVH, G1DD, LA/RA mod dilated, interatrial septum aneurysm, mild MR  - Per outpt cardiologist, pt was found to have new WMA in basal inferior walls at [facility], f/u repeat TTE in AM  - recent [facility] 7/14/23: MARTINA/Scoreflex pOM1 (80%), Scoreflex PTCA mLAD ISR (75%), pRCA 20%, RPDA mild diffuse  - NPO after MN for cardiac cath in AM, pt consented, precath IVF NS @ 75cc/hr x 12hrs o/n  - Continue ASA 81mg QD, Plavix 75mg QD, Lipitor 40mg HS, Imdur 30mg QD, and Toprol 25mg QD presents w/ substernal CP at rest, improved w/ ASA 81mg x 2, currently CP free and HD stable  - hsTrop T 20, , CKMB 3.2, f/u repeat enzymes  - EKG non ischemic  - TTE 5/18/23: LVEF 49%, mild-mod LVH, G1DD, LA/RA mod dilated, interatrial septum aneurysm, mild MR  - Per outpt cardiologist, pt was found to have new WMA in basal inferior walls at [facility], f/u repeat TTE in AM  - recent [facility] 7/14/23: MARTINA/Scoreflex pOM1 (80%), Scoreflex PTCA mLAD ISR (75%), pRCA 20%, RPDA mild diffuse  - NPO after MN for cardiac cath in AM, pt consented, precath IVF NS @ 75cc/hr x 12hrs o/n  - Continue ASA 81mg QD, Plavix 75mg QD, Lipitor 40mg HS, Imdur 30mg QD, and Toprol 25mg QD presents w/ substernal CP at rest, improved w/ ASA 81mg x 2, currently CP free and HD stable  - hsTrop T 20, , CKMB 3.2, f/u repeat enzymes  - EKG non ischemic  - TTE 5/18/23: LVEF 49%, mild-mod LVH, G1DD, LA/RA mod dilated, interatrial septum aneurysm, mild MR  - Per outpt cardiologist, pt was found to have new WMA in basal inferior walls at [facility], f/u repeat TTE in AM  - recent [facility] 7/14/23: MARTINA/Scoreflex pOM1 (80%), Scoreflex PTCA mLAD ISR (75%), pRCA 20%, RPDA mild diffuse  - NPO after MN for cardiac cath in AM, pt consented, precath IVF NS @ 75cc/hr x 12hrs o/n  - Continue ASA 81mg QD, Plavix 75mg QD, Lipitor 40mg HS, Imdur 30mg QD, and Toprol 25mg QD presents w/ substernal CP at rest, improved w/ ASA 81mg x 2, currently CP free and HD stable  - hsTrop T 20, , CKMB 3.2, f/u repeat enzymes  - EKG non ischemic  - TTE 5/18/23: LVEF 49%, mild-mod LVH, G1DD, LA/RA mod dilated, interatrial septum aneurysm, mild MR  - Per outpt cardiologist, pt was found to have new WMA in basal inferior walls at [facility], f/u repeat TTE in AM  - recent [facility] 7/14/23: MARTINA/Scoreflex pOM1 (80%), Scoreflex PTCA mLAD ISR (75%), pRCA 20%, RPDA mild diffuse  - NPO after MN for cardiac cath in AM, pt consented, precath IVF NS @ 75cc/hr x 12hrs o/n  - Continue ASA 81mg QD, Plavix 75mg QD, Lipitor 40mg HS, Imdur 60mg QD, and Toprol 25mg QD presents w/ substernal CP at rest, improved w/ ASA 81mg x 2, currently CP free and HD stable  - hsTrop T 20, , CKMB 3.2, f/u repeat enzymes  - EKG non ischemic  - TTE 5/18/23: LVEF 49%, mild-mod LVH, G1DD, LA/RA mod dilated, interatrial septum aneurysm, mild MR  - Per outpt cardiologist, pt was found to have new WMA in basal inferior walls at [facility], f/u repeat TTE in AM  - recent [facility] 7/14/23: MARTINA/Scoreflex pOM1 (80%), Scoreflex PTCA mLAD ISR (75%), pRCA 20%, RPDA mild diffuse  - NPO after MN for cardiac cath in AM, pt consented, precath IVF NS @ 75cc/hr x 12hrs o/n  - Continue ASA 81mg QD, Plavix 75mg QD, Lipitor 40mg HS, Imdur 60mg QD, and Toprol 25mg QD

## 2024-01-04 NOTE — ED PROVIDER NOTE - NS ED ROS FT
CONSTITUTIONAL: No fever, no chills, no fatigue  EYES: No eye redness, no visual changes  ENT: No ear pain, no sore throat  CARDIOVASCULAR: +chest pain, no palpitations  RESPIRATORY: No cough, no SOB  GI: No abdominal pain, no nausea, no vomiting, no constipation, no diarrhea  GENITOURINARY: No dysuria, no frequency, no hematuria  MUSCULOSKELETAL: No back pain, no joint pain, no myalgias  SKIN: No rash, no peripheral edema  NEURO: No headache, no confusion    ALL OTHER SYSTEMS NEGATIVE.

## 2024-01-04 NOTE — H&P ADULT - HISTORY OF PRESENT ILLNESS
87F, Arabic speaking, [place], w/ PMHx of HTN, HLD, CAD s/p multiple PCIs (most recently 7/2023 w/ MARTINA LAD and MARTINA OM1), HFmEF (LVEF 49%), DM-II, Hypothyroidism, PAD s/p ? PTA, h/o urinary retention c/b UTIs, and balance d/o, presented to [facility] ED c/o intermittent, non exertional, substernal CP, non radiating, which improved w/ ASA 81mg x 2. She denies any ___ palpitations, dizziness, syncope, diaphoresis, fatigue, LE edema, BARRON, orthopnea, PND, N/V/D, abd pain, cough, congestion, fever, chills or recent sick contact.     In ED, /66, HR 79bpm, T 98.4F, RR 20, SpO2 97% RA  Labs - hsTrop T 20 and BUN 33  EKG - NSR, non ischemic    Pt now admitted to cardiac telemetry for further management of unstable angina. 87F, English speaking, [place], w/ PMHx of HTN, HLD, CAD s/p multiple PCIs (most recently 7/2023 w/ MARTINA LAD and MARTINA OM1), HFmEF (LVEF 49%), DM-II, Hypothyroidism, PAD s/p ? PTA, h/o urinary retention c/b UTIs, and balance d/o, presented to [facility] ED c/o intermittent, non exertional, substernal CP, non radiating, which improved w/ ASA 81mg x 2. She denies any ___ palpitations, dizziness, syncope, diaphoresis, fatigue, LE edema, BARRON, orthopnea, PND, N/V/D, abd pain, cough, congestion, fever, chills or recent sick contact.     In ED, /66, HR 79bpm, T 98.4F, RR 20, SpO2 97% RA  Labs - hsTrop T 20 and BUN 33  EKG - NSR, non ischemic    Pt now admitted to cardiac telemetry for further management of unstable angina. 87F, Azeri speaking, [place], w/ PMHx of HTN, HLD, CAD s/p multiple PCIs (most recently 7/2023 w/ MARTINA LAD and MARTINA OM1), HFmEF (LVEF 49%), DM-II, Hypothyroidism, PAD s/p ? PTA, h/o urinary retention c/b UTIs, balance d/o, recent hospitalization at [facility] for COVID (found to have new WMA in basal inferior wall), was referred to [facility] ED by outpt cardiologist for intermittent substernal CP at rest x ___. Pt endorses 5-6 episodes of CP per day, lasting 15 minutes and improves w/ ASA and Imdur. She denies any ___ palpitations, dizziness, syncope, diaphoresis, fatigue, LE edema, BARRON, orthopnea, PND, N/V/D, abd pain, cough, congestion, fever, chills or recent sick contact.     In ED, /66, HR 79bpm, T 98.4F, RR 20, SpO2 97% RA  Labs - hsTrop T 20 and BUN 33  EKG - NSR, non ischemic    Pt now admitted to cardiac telemetry for further management of unstable angina. 87F, Kiswahili speaking, [place], w/ PMHx of HTN, HLD, CAD s/p multiple PCIs (most recently 7/2023 w/ MARTINA LAD and MARTINA OM1), HFmEF (LVEF 49%), DM-II, Hypothyroidism, PAD s/p ? PTA, h/o urinary retention c/b UTIs, balance d/o, recent hospitalization at [facility] for COVID (found to have new WMA in basal inferior wall), was referred to [facility] ED by outpt cardiologist for intermittent substernal CP at rest x ___. Pt endorses 5-6 episodes of CP per day, lasting 15 minutes and improves w/ ASA and Imdur. She denies any ___ palpitations, dizziness, syncope, diaphoresis, fatigue, LE edema, BARRON, orthopnea, PND, N/V/D, abd pain, cough, congestion, fever, chills or recent sick contact.     In ED, /66, HR 79bpm, T 98.4F, RR 20, SpO2 97% RA  Labs - hsTrop T 20 and BUN 33  EKG - NSR, non ischemic    Pt now admitted to cardiac telemetry for further management of unstable angina. 87F, Tamazight speaking, [place], w/ PMHx of HTN, HLD, CAD s/p multiple PCIs (most recently 7/2023 w/ MARTINA LAD and MARTINA OM1), HFmEF (LVEF 49%), DM-II, Hypothyroidism, PAD s/p ? PTA, h/o urinary retention c/b UTIs, balance d/o, and recent hospitalization at [facility] for COVID and CP (found to have new WMA in basal inferior wall), was referred to [facility] ED by outpt cardiologist for intermittent substernal CP at rest x ___. Pt endorses 5-6 episodes of CP per day, lasting 15 minutes and improves w/ ASA and Imdur. She denies any ___ palpitations, dizziness, syncope, diaphoresis, fatigue, LE edema, BARRON, orthopnea, PND, N/V/D, abd pain, cough, congestion, fever, chills or recent sick contact.     In ED, /66, HR 79bpm, T 98.4F, RR 20, SpO2 97% RA  Labs - hsTrop T 20 and BUN 33  EKG - NSR, non ischemic    Pt now admitted to cardiac telemetry for further management of unstable angina. 87F, Belarusian speaking, [place], w/ PMHx of HTN, HLD, CAD s/p multiple PCIs (most recently 7/2023 w/ MARTINA LAD and MARTINA OM1), HFmEF (LVEF 49%), DM-II, Hypothyroidism, PAD s/p ? PTA, h/o urinary retention c/b UTIs, balance d/o, and [facility] and CP (found to have new WMA in basal inferior wall), was referred to [facility] ED by outpt cardiologist for intermittent substernal CP at rest x ___. Pt endorses 5-6 episodes of CP per day, lasting 15 minutes and improves w/ ASA and Imdur. She denies any ___ palpitations, dizziness, syncope, diaphoresis, fatigue, LE edema, BARRON, orthopnea, PND, N/V/D, abd pain, cough, congestion, fever, chills or recent sick contact.     In ED, /66, HR 79bpm, T 98.4F, RR 20, SpO2 97% RA  Labs - hsTrop T 20 and BUN 33  EKG - NSR, non ischemic    Pt now admitted to cardiac telemetry for further management of unstable angina. 87F, Sami speaking, [place], w/ PMHx of HTN, HLD, CAD s/p multiple PCIs (most recently 7/2023 w/ MARTINA LAD and MARTINA OM1), HFmEF (LVEF 49%), DM-II, Hypothyroidism, PAD s/p ? PTA, h/o urinary retention c/b UTIs, balance d/o, and recent hospitalization at [facility] for COVID and CP (found to have new WMA in basal inferior wall), was referred to [facility] ED by outpt cardiologist for intermittent substernal CP at rest x ___. Pt endorses 5-6 episodes of CP per day, lasting 15 minutes and improves w/ ASA and Imdur. She denies any ___ palpitations, dizziness, syncope, diaphoresis, fatigue, LE edema, BARRON, orthopnea, PND, N/V/D, abd pain, cough, congestion, fever, chills or recent sick contact.     In ED, /66, HR 79bpm, T 98.4F, RR 20, SpO2 97% RA  Labs - hsTrop T 20, , CKMB 3.2 and BUN 33  EKG - NSR, non ischemic    Pt now admitted to cardiac telemetry for further management of unstable angina. 87F, German speaking, [place], w/ PMHx of HTN, HLD, CAD s/p multiple PCIs (most recently 7/2023 w/ MARTINA LAD and MARTINA OM1), HFmEF (LVEF 49%), DM-II, Hypothyroidism, PAD s/p ? PTA, h/o urinary retention c/b UTIs, balance d/o, and recent hospitalization at [facility] for COVID and CP (found to have new WMA in basal inferior wall), was referred to [facility] ED by outpt cardiologist for intermittent substernal CP at rest x ___. Pt endorses 5-6 episodes of CP per day, lasting 15 minutes and improves w/ ASA and Imdur. She denies any ___ palpitations, dizziness, syncope, diaphoresis, fatigue, LE edema, BARRON, orthopnea, PND, N/V/D, abd pain, cough, congestion, fever, chills or recent sick contact.     In ED, /66, HR 79bpm, T 98.4F, RR 20, SpO2 97% RA  Labs - hsTrop T 20, , CKMB 3.2 and BUN 33  EKG - NSR, non ischemic    Pt now admitted to cardiac telemetry for further management of unstable angina. 87F, Turkish speaking, [place], w/ PMHx of HTN, HLD, CAD s/p multiple PCIs (most recently 7/2023 w/ MARTINA pOM1 and PTCA mLAD ISR), HFmEF (LVEF 49%), DM-II, Hypothyroidism, PAD s/p ? PTA, h/o urinary retention c/b UTIs, balance d/o, and recent hospitalization at [facility] for COVID and CP (found to have new WMA in basal inferior wall), was referred to [facility] ED by outpt cardiologist for intermittent substernal CP at rest x ___. Pt endorses 5-6 episodes of CP per day, lasting 15 minutes and improves w/ ASA and Imdur. She denies any ___ palpitations, dizziness, syncope, diaphoresis, fatigue, LE edema, BARRON, orthopnea, PND, N/V/D, abd pain, cough, congestion, fever, chills or recent sick contact.     In ED, /66, HR 79bpm, T 98.4F, RR 20, SpO2 97% RA  Labs - hsTrop T 20, , CKMB 3.2 and BUN 33  EKG - NSR, non ischemic    Pt now admitted to cardiac telemetry for further management of unstable angina. 87F, Maori speaking, [place], w/ PMHx of HTN, HLD, CAD s/p multiple PCIs (most recently 7/2023 w/ MARTINA pOM1 and PTCA mLAD ISR), HFmEF (LVEF 49%), DM-II, Hypothyroidism, PAD s/p ? PTA, h/o urinary retention c/b UTIs, balance d/o, and recent hospitalization at [facility] for COVID and CP (found to have new WMA in basal inferior wall), was referred to [facility] ED by outpt cardiologist for intermittent substernal CP at rest x ___. Pt endorses 5-6 episodes of CP per day, lasting 15 minutes and improves w/ ASA and Imdur. She denies any ___ palpitations, dizziness, syncope, diaphoresis, fatigue, LE edema, BARRON, orthopnea, PND, N/V/D, abd pain, cough, congestion, fever, chills or recent sick contact.     In ED, /66, HR 79bpm, T 98.4F, RR 20, SpO2 97% RA  Labs - hsTrop T 20, , CKMB 3.2 and BUN 33  EKG - NSR, non ischemic    Pt now admitted to cardiac telemetry for further management of unstable angina. 87F, Ukrainian speaking, [place], w/ PMHx of HTN, HLD, CAD s/p multiple PCIs (most recently 7/2023 w/ MARTINA pOM1 and PTCA mLAD ISR), HFmEF (LVEF 49%), DM-II, Hypothyroidism, PAD s/p ? PTA, h/o urinary retention c/b UTIs, balance d/o, and recent hospitalization at [facility] for COVID and CP (found to have new WMA in basal inferior wall), was referred to [facility] ED by outpt cardiologist for intermittent substernal CP at rest for several days. Pt endorses 5-6 episodes of CP per day, lasting 15 minutes and improves w/ ASA and Imdur. She denies any palpitations, dizziness, syncope, diaphoresis, fatigue, LE edema, BARRON, orthopnea, PND, N/V/D, abd pain, cough, congestion, fever, chills or recent sick contact.     In ED, /66, HR 79bpm, T 98.4F, RR 20, SpO2 97% RA  Labs - hsTrop T 20, , CKMB 3.2 and BUN 33  EKG - NSR, non ischemic    Pt now admitted to cardiac telemetry for further management of unstable angina. 87F, Maltese speaking, [place], w/ PMHx of HTN, HLD, CAD s/p multiple PCIs (most recently 7/2023 w/ MARTINA pOM1 and PTCA mLAD ISR), HFmEF (LVEF 49%), DM-II, Hypothyroidism, PAD s/p ? PTA, h/o urinary retention c/b UTIs, balance d/o, and recent hospitalization at [facility] for COVID and CP (found to have new WMA in basal inferior wall), was referred to [facility] ED by outpt cardiologist for intermittent substernal CP at rest for several days. Pt endorses 5-6 episodes of CP per day, lasting 15 minutes and improves w/ ASA and Imdur. She denies any palpitations, dizziness, syncope, diaphoresis, fatigue, LE edema, BARRON, orthopnea, PND, N/V/D, abd pain, cough, congestion, fever, chills or recent sick contact.     In ED, /66, HR 79bpm, T 98.4F, RR 20, SpO2 97% RA  Labs - hsTrop T 20, , CKMB 3.2 and BUN 33  EKG - NSR, non ischemic    Pt now admitted to cardiac telemetry for further management of unstable angina. 87F, Armenian speaking, [place], w/ PMHx of HTN, HLD, CAD s/p multiple PCIs (most recently 7/2023 w/ MARTINA pOM1 and PTCA mLAD ISR), HFmEF (LVEF 49%), DM-II, Hypothyroidism, PAD s/p ? PTA, Glaucoma c/b L eye blindness, h/o urinary retention c/b UTIs, balance d/o, and recent hospitalization at [facility] for COVID and CP (found to have new WMA in basal inferior wall), was referred to [facility] ED by outpt cardiologist for intermittent substernal CP at rest for several days. Pt endorses 5-6 episodes of CP per day, lasting 15 minutes and improves w/ ASA and Imdur. She also reports associated SOB and worsening BARRON when ambulating around the house. Pt denies any palpitations, dizziness, syncope, diaphoresis, fatigue, LE edema, BARRON, orthopnea, PND, N/V/D, abd pain, cough, congestion, fever, chills or recent sick contact.     In ED, /66, HR 79bpm, T 98.4F, RR 20, SpO2 97% RA  Labs - hsTrop T 20, , CKMB 3.2 and BUN 33  EKG - NSR, non ischemic    Pt now admitted to cardiac telemetry for further management of unstable angina. 87F, Kinyarwanda speaking, [place], w/ PMHx of HTN, HLD, CAD s/p multiple PCIs (most recently 7/2023 w/ MARTINA pOM1 and PTCA mLAD ISR), HFmEF (LVEF 49%), DM-II, Hypothyroidism, PAD s/p ? PTA, Glaucoma c/b L eye blindness, h/o urinary retention c/b UTIs, balance d/o, and recent hospitalization at [facility] for COVID and CP (found to have new WMA in basal inferior wall), was referred to [facility] ED by outpt cardiologist for intermittent substernal CP at rest for several days. Pt endorses 5-6 episodes of CP per day, lasting 15 minutes and improves w/ ASA and Imdur. She also reports associated SOB and worsening BARRON when ambulating around the house. Pt denies any palpitations, dizziness, syncope, diaphoresis, fatigue, LE edema, BARRON, orthopnea, PND, N/V/D, abd pain, cough, congestion, fever, chills or recent sick contact.     In ED, /66, HR 79bpm, T 98.4F, RR 20, SpO2 97% RA  Labs - hsTrop T 20, , CKMB 3.2 and BUN 33  EKG - NSR, non ischemic    Pt now admitted to cardiac telemetry for further management of unstable angina.

## 2024-01-04 NOTE — H&P ADULT - PROBLEM SELECTOR PLAN 6
- f/u AM TFTs  - Continue Levothyroxine 75mcg QD    F: NS @ 75cc/hr x 12hrs  E: Replete if K<4 or Mag<2  N: DASH Diet  GIppx: Pantoprazole  VTEppx: None 2/2 cath  Dispo: cardiac tele

## 2024-01-04 NOTE — H&P ADULT - PROBLEM SELECTOR PLAN 3
SBP  - Continue Losartan 50mg QD, Toprol 25mg QD, and Imdur 30mg QD SBP  - Continue Toprol 25mg QD, and Imdur 30mg QD SBP  - Continue Toprol 25mg QD, and Imdur 60mg QD

## 2024-01-05 LAB
A1C WITH ESTIMATED AVERAGE GLUCOSE RESULT: 10.1 % — HIGH (ref 4–5.6)
A1C WITH ESTIMATED AVERAGE GLUCOSE RESULT: 10.1 % — HIGH (ref 4–5.6)
ALBUMIN SERPL ELPH-MCNC: 2.3 G/DL — LOW (ref 3.3–5)
ALBUMIN SERPL ELPH-MCNC: 2.3 G/DL — LOW (ref 3.3–5)
ALBUMIN SERPL ELPH-MCNC: 2.8 G/DL — LOW (ref 3.3–5)
ALBUMIN SERPL ELPH-MCNC: 2.8 G/DL — LOW (ref 3.3–5)
ALP SERPL-CCNC: 72 U/L — SIGNIFICANT CHANGE UP (ref 40–120)
ALP SERPL-CCNC: 72 U/L — SIGNIFICANT CHANGE UP (ref 40–120)
ALP SERPL-CCNC: 73 U/L — SIGNIFICANT CHANGE UP (ref 40–120)
ALP SERPL-CCNC: 73 U/L — SIGNIFICANT CHANGE UP (ref 40–120)
ALT FLD-CCNC: 10 U/L — SIGNIFICANT CHANGE UP (ref 10–45)
ALT FLD-CCNC: 10 U/L — SIGNIFICANT CHANGE UP (ref 10–45)
ALT FLD-CCNC: 8 U/L — LOW (ref 10–45)
ALT FLD-CCNC: 8 U/L — LOW (ref 10–45)
ANION GAP SERPL CALC-SCNC: 10 MMOL/L — SIGNIFICANT CHANGE UP (ref 5–17)
ANION GAP SERPL CALC-SCNC: 10 MMOL/L — SIGNIFICANT CHANGE UP (ref 5–17)
ANION GAP SERPL CALC-SCNC: 8 MMOL/L — SIGNIFICANT CHANGE UP (ref 5–17)
ANION GAP SERPL CALC-SCNC: 8 MMOL/L — SIGNIFICANT CHANGE UP (ref 5–17)
APTT BLD: 27.1 SEC — SIGNIFICANT CHANGE UP (ref 24.5–35.6)
APTT BLD: 27.1 SEC — SIGNIFICANT CHANGE UP (ref 24.5–35.6)
AST SERPL-CCNC: 11 U/L — SIGNIFICANT CHANGE UP (ref 10–40)
AST SERPL-CCNC: 11 U/L — SIGNIFICANT CHANGE UP (ref 10–40)
AST SERPL-CCNC: 16 U/L — SIGNIFICANT CHANGE UP (ref 10–40)
AST SERPL-CCNC: 16 U/L — SIGNIFICANT CHANGE UP (ref 10–40)
BILIRUB SERPL-MCNC: 0.2 MG/DL — SIGNIFICANT CHANGE UP (ref 0.2–1.2)
BILIRUB SERPL-MCNC: 0.2 MG/DL — SIGNIFICANT CHANGE UP (ref 0.2–1.2)
BILIRUB SERPL-MCNC: 0.4 MG/DL — SIGNIFICANT CHANGE UP (ref 0.2–1.2)
BILIRUB SERPL-MCNC: 0.4 MG/DL — SIGNIFICANT CHANGE UP (ref 0.2–1.2)
BUN SERPL-MCNC: 24 MG/DL — HIGH (ref 7–23)
BUN SERPL-MCNC: 24 MG/DL — HIGH (ref 7–23)
BUN SERPL-MCNC: 30 MG/DL — HIGH (ref 7–23)
BUN SERPL-MCNC: 30 MG/DL — HIGH (ref 7–23)
CALCIUM SERPL-MCNC: 8 MG/DL — LOW (ref 8.4–10.5)
CALCIUM SERPL-MCNC: 8 MG/DL — LOW (ref 8.4–10.5)
CALCIUM SERPL-MCNC: 8.6 MG/DL — SIGNIFICANT CHANGE UP (ref 8.4–10.5)
CALCIUM SERPL-MCNC: 8.6 MG/DL — SIGNIFICANT CHANGE UP (ref 8.4–10.5)
CHLORIDE SERPL-SCNC: 109 MMOL/L — HIGH (ref 96–108)
CHLORIDE SERPL-SCNC: 109 MMOL/L — HIGH (ref 96–108)
CHLORIDE SERPL-SCNC: 111 MMOL/L — HIGH (ref 96–108)
CHLORIDE SERPL-SCNC: 111 MMOL/L — HIGH (ref 96–108)
CHOLEST SERPL-MCNC: 91 MG/DL — SIGNIFICANT CHANGE UP
CHOLEST SERPL-MCNC: 91 MG/DL — SIGNIFICANT CHANGE UP
CO2 SERPL-SCNC: 17 MMOL/L — LOW (ref 22–31)
CO2 SERPL-SCNC: 17 MMOL/L — LOW (ref 22–31)
CO2 SERPL-SCNC: 19 MMOL/L — LOW (ref 22–31)
CO2 SERPL-SCNC: 19 MMOL/L — LOW (ref 22–31)
CREAT SERPL-MCNC: 0.57 MG/DL — SIGNIFICANT CHANGE UP (ref 0.5–1.3)
CREAT SERPL-MCNC: 0.57 MG/DL — SIGNIFICANT CHANGE UP (ref 0.5–1.3)
CREAT SERPL-MCNC: 0.71 MG/DL — SIGNIFICANT CHANGE UP (ref 0.5–1.3)
CREAT SERPL-MCNC: 0.71 MG/DL — SIGNIFICANT CHANGE UP (ref 0.5–1.3)
EGFR: 82 ML/MIN/1.73M2 — SIGNIFICANT CHANGE UP
EGFR: 82 ML/MIN/1.73M2 — SIGNIFICANT CHANGE UP
EGFR: 87 ML/MIN/1.73M2 — SIGNIFICANT CHANGE UP
EGFR: 87 ML/MIN/1.73M2 — SIGNIFICANT CHANGE UP
ESTIMATED AVERAGE GLUCOSE: 243 MG/DL — HIGH (ref 68–114)
ESTIMATED AVERAGE GLUCOSE: 243 MG/DL — HIGH (ref 68–114)
GLUCOSE BLDC GLUCOMTR-MCNC: 143 MG/DL — HIGH (ref 70–99)
GLUCOSE BLDC GLUCOMTR-MCNC: 143 MG/DL — HIGH (ref 70–99)
GLUCOSE BLDC GLUCOMTR-MCNC: 152 MG/DL — HIGH (ref 70–99)
GLUCOSE BLDC GLUCOMTR-MCNC: 152 MG/DL — HIGH (ref 70–99)
GLUCOSE BLDC GLUCOMTR-MCNC: 164 MG/DL — HIGH (ref 70–99)
GLUCOSE BLDC GLUCOMTR-MCNC: 164 MG/DL — HIGH (ref 70–99)
GLUCOSE BLDC GLUCOMTR-MCNC: 302 MG/DL — HIGH (ref 70–99)
GLUCOSE BLDC GLUCOMTR-MCNC: 302 MG/DL — HIGH (ref 70–99)
GLUCOSE SERPL-MCNC: 154 MG/DL — HIGH (ref 70–99)
GLUCOSE SERPL-MCNC: 154 MG/DL — HIGH (ref 70–99)
GLUCOSE SERPL-MCNC: 361 MG/DL — HIGH (ref 70–99)
GLUCOSE SERPL-MCNC: 361 MG/DL — HIGH (ref 70–99)
HCT VFR BLD CALC: 28.9 % — LOW (ref 34.5–45)
HCT VFR BLD CALC: 28.9 % — LOW (ref 34.5–45)
HCT VFR BLD CALC: 29.3 % — LOW (ref 34.5–45)
HCT VFR BLD CALC: 29.3 % — LOW (ref 34.5–45)
HDLC SERPL-MCNC: 51 MG/DL — SIGNIFICANT CHANGE UP
HDLC SERPL-MCNC: 51 MG/DL — SIGNIFICANT CHANGE UP
HGB BLD-MCNC: 10.1 G/DL — LOW (ref 11.5–15.5)
HGB BLD-MCNC: 10.1 G/DL — LOW (ref 11.5–15.5)
HGB BLD-MCNC: 9.5 G/DL — LOW (ref 11.5–15.5)
HGB BLD-MCNC: 9.5 G/DL — LOW (ref 11.5–15.5)
INR BLD: 0.95 — SIGNIFICANT CHANGE UP (ref 0.85–1.18)
INR BLD: 0.95 — SIGNIFICANT CHANGE UP (ref 0.85–1.18)
LACTATE SERPL-SCNC: 1.5 MMOL/L — SIGNIFICANT CHANGE UP (ref 0.5–2)
LACTATE SERPL-SCNC: 1.5 MMOL/L — SIGNIFICANT CHANGE UP (ref 0.5–2)
LIPID PNL WITH DIRECT LDL SERPL: 25 MG/DL — SIGNIFICANT CHANGE UP
LIPID PNL WITH DIRECT LDL SERPL: 25 MG/DL — SIGNIFICANT CHANGE UP
MAGNESIUM SERPL-MCNC: 1.8 MG/DL — SIGNIFICANT CHANGE UP (ref 1.6–2.6)
MAGNESIUM SERPL-MCNC: 1.8 MG/DL — SIGNIFICANT CHANGE UP (ref 1.6–2.6)
MCHC RBC-ENTMCNC: 31 PG — SIGNIFICANT CHANGE UP (ref 27–34)
MCHC RBC-ENTMCNC: 31 PG — SIGNIFICANT CHANGE UP (ref 27–34)
MCHC RBC-ENTMCNC: 31.4 PG — SIGNIFICANT CHANGE UP (ref 27–34)
MCHC RBC-ENTMCNC: 31.4 PG — SIGNIFICANT CHANGE UP (ref 27–34)
MCHC RBC-ENTMCNC: 32.9 GM/DL — SIGNIFICANT CHANGE UP (ref 32–36)
MCHC RBC-ENTMCNC: 32.9 GM/DL — SIGNIFICANT CHANGE UP (ref 32–36)
MCHC RBC-ENTMCNC: 34.5 GM/DL — SIGNIFICANT CHANGE UP (ref 32–36)
MCHC RBC-ENTMCNC: 34.5 GM/DL — SIGNIFICANT CHANGE UP (ref 32–36)
MCV RBC AUTO: 91 FL — SIGNIFICANT CHANGE UP (ref 80–100)
MCV RBC AUTO: 91 FL — SIGNIFICANT CHANGE UP (ref 80–100)
MCV RBC AUTO: 94.4 FL — SIGNIFICANT CHANGE UP (ref 80–100)
MCV RBC AUTO: 94.4 FL — SIGNIFICANT CHANGE UP (ref 80–100)
NON HDL CHOLESTEROL: 40 MG/DL — SIGNIFICANT CHANGE UP
NON HDL CHOLESTEROL: 40 MG/DL — SIGNIFICANT CHANGE UP
NRBC # BLD: 0 /100 WBCS — SIGNIFICANT CHANGE UP (ref 0–0)
PLATELET # BLD AUTO: 212 K/UL — SIGNIFICANT CHANGE UP (ref 150–400)
PLATELET # BLD AUTO: 212 K/UL — SIGNIFICANT CHANGE UP (ref 150–400)
PLATELET # BLD AUTO: 218 K/UL — SIGNIFICANT CHANGE UP (ref 150–400)
PLATELET # BLD AUTO: 218 K/UL — SIGNIFICANT CHANGE UP (ref 150–400)
POTASSIUM SERPL-MCNC: 4 MMOL/L — SIGNIFICANT CHANGE UP (ref 3.5–5.3)
POTASSIUM SERPL-MCNC: 4 MMOL/L — SIGNIFICANT CHANGE UP (ref 3.5–5.3)
POTASSIUM SERPL-MCNC: 4.1 MMOL/L — SIGNIFICANT CHANGE UP (ref 3.5–5.3)
POTASSIUM SERPL-MCNC: 4.1 MMOL/L — SIGNIFICANT CHANGE UP (ref 3.5–5.3)
POTASSIUM SERPL-SCNC: 4 MMOL/L — SIGNIFICANT CHANGE UP (ref 3.5–5.3)
POTASSIUM SERPL-SCNC: 4 MMOL/L — SIGNIFICANT CHANGE UP (ref 3.5–5.3)
POTASSIUM SERPL-SCNC: 4.1 MMOL/L — SIGNIFICANT CHANGE UP (ref 3.5–5.3)
POTASSIUM SERPL-SCNC: 4.1 MMOL/L — SIGNIFICANT CHANGE UP (ref 3.5–5.3)
PROT SERPL-MCNC: 5.6 G/DL — LOW (ref 6–8.3)
PROT SERPL-MCNC: 5.6 G/DL — LOW (ref 6–8.3)
PROT SERPL-MCNC: 5.7 G/DL — LOW (ref 6–8.3)
PROT SERPL-MCNC: 5.7 G/DL — LOW (ref 6–8.3)
PROTHROM AB SERPL-ACNC: 10.9 SEC — SIGNIFICANT CHANGE UP (ref 9.5–13)
PROTHROM AB SERPL-ACNC: 10.9 SEC — SIGNIFICANT CHANGE UP (ref 9.5–13)
RBC # BLD: 3.06 M/UL — LOW (ref 3.8–5.2)
RBC # BLD: 3.06 M/UL — LOW (ref 3.8–5.2)
RBC # BLD: 3.22 M/UL — LOW (ref 3.8–5.2)
RBC # BLD: 3.22 M/UL — LOW (ref 3.8–5.2)
RBC # FLD: 15 % — HIGH (ref 10.3–14.5)
RBC # FLD: 15 % — HIGH (ref 10.3–14.5)
RBC # FLD: 15.1 % — HIGH (ref 10.3–14.5)
RBC # FLD: 15.1 % — HIGH (ref 10.3–14.5)
SODIUM SERPL-SCNC: 136 MMOL/L — SIGNIFICANT CHANGE UP (ref 135–145)
SODIUM SERPL-SCNC: 136 MMOL/L — SIGNIFICANT CHANGE UP (ref 135–145)
SODIUM SERPL-SCNC: 138 MMOL/L — SIGNIFICANT CHANGE UP (ref 135–145)
SODIUM SERPL-SCNC: 138 MMOL/L — SIGNIFICANT CHANGE UP (ref 135–145)
T3 SERPL-MCNC: 79 NG/DL — LOW (ref 80–200)
T3 SERPL-MCNC: 79 NG/DL — LOW (ref 80–200)
T4 AB SER-ACNC: 7.09 UG/DL — SIGNIFICANT CHANGE UP (ref 4.5–11.7)
T4 AB SER-ACNC: 7.09 UG/DL — SIGNIFICANT CHANGE UP (ref 4.5–11.7)
TRIGL SERPL-MCNC: 74 MG/DL — SIGNIFICANT CHANGE UP
TRIGL SERPL-MCNC: 74 MG/DL — SIGNIFICANT CHANGE UP
TSH SERPL-MCNC: 2.08 UIU/ML — SIGNIFICANT CHANGE UP (ref 0.27–4.2)
TSH SERPL-MCNC: 2.08 UIU/ML — SIGNIFICANT CHANGE UP (ref 0.27–4.2)
WBC # BLD: 10.05 K/UL — SIGNIFICANT CHANGE UP (ref 3.8–10.5)
WBC # BLD: 10.05 K/UL — SIGNIFICANT CHANGE UP (ref 3.8–10.5)
WBC # BLD: 10.26 K/UL — SIGNIFICANT CHANGE UP (ref 3.8–10.5)
WBC # BLD: 10.26 K/UL — SIGNIFICANT CHANGE UP (ref 3.8–10.5)
WBC # FLD AUTO: 10.05 K/UL — SIGNIFICANT CHANGE UP (ref 3.8–10.5)
WBC # FLD AUTO: 10.05 K/UL — SIGNIFICANT CHANGE UP (ref 3.8–10.5)
WBC # FLD AUTO: 10.26 K/UL — SIGNIFICANT CHANGE UP (ref 3.8–10.5)
WBC # FLD AUTO: 10.26 K/UL — SIGNIFICANT CHANGE UP (ref 3.8–10.5)

## 2024-01-05 PROCEDURE — 93306 TTE W/DOPPLER COMPLETE: CPT | Mod: 26

## 2024-01-05 PROCEDURE — 93458 L HRT ARTERY/VENTRICLE ANGIO: CPT | Mod: 26

## 2024-01-05 RX ORDER — SODIUM CHLORIDE 9 MG/ML
500 INJECTION INTRAMUSCULAR; INTRAVENOUS; SUBCUTANEOUS
Refills: 0 | Status: DISCONTINUED | OUTPATIENT
Start: 2024-01-05 | End: 2024-01-06

## 2024-01-05 RX ORDER — HYDRALAZINE HCL 50 MG
10 TABLET ORAL ONCE
Refills: 0 | Status: COMPLETED | OUTPATIENT
Start: 2024-01-05 | End: 2024-01-05

## 2024-01-05 RX ORDER — INSULIN LISPRO 100/ML
3 VIAL (ML) SUBCUTANEOUS
Refills: 0 | Status: DISCONTINUED | OUTPATIENT
Start: 2024-01-05 | End: 2024-01-06

## 2024-01-05 RX ORDER — ACETAMINOPHEN 500 MG
650 TABLET ORAL ONCE
Refills: 0 | Status: COMPLETED | OUTPATIENT
Start: 2024-01-05 | End: 2024-01-05

## 2024-01-05 RX ORDER — LOSARTAN POTASSIUM 100 MG/1
25 TABLET, FILM COATED ORAL DAILY
Refills: 0 | Status: DISCONTINUED | OUTPATIENT
Start: 2024-01-05 | End: 2024-01-05

## 2024-01-05 RX ORDER — MAGNESIUM OXIDE 400 MG ORAL TABLET 241.3 MG
800 TABLET ORAL ONCE
Refills: 0 | Status: COMPLETED | OUTPATIENT
Start: 2024-01-05 | End: 2024-01-05

## 2024-01-05 RX ORDER — LOSARTAN POTASSIUM 100 MG/1
25 TABLET, FILM COATED ORAL DAILY
Refills: 0 | Status: DISCONTINUED | OUTPATIENT
Start: 2024-01-05 | End: 2024-01-06

## 2024-01-05 RX ADMIN — Medication 10 MILLIGRAM(S): at 17:33

## 2024-01-05 RX ADMIN — Medication 3 UNIT(S): at 17:13

## 2024-01-05 RX ADMIN — LOSARTAN POTASSIUM 25 MILLIGRAM(S): 100 TABLET, FILM COATED ORAL at 10:24

## 2024-01-05 RX ADMIN — ISOSORBIDE MONONITRATE 60 MILLIGRAM(S): 60 TABLET, EXTENDED RELEASE ORAL at 14:49

## 2024-01-05 RX ADMIN — Medication 650 MILLIGRAM(S): at 04:16

## 2024-01-05 RX ADMIN — SODIUM CHLORIDE 75 MILLILITER(S): 9 INJECTION INTRAMUSCULAR; INTRAVENOUS; SUBCUTANEOUS at 06:41

## 2024-01-05 RX ADMIN — ATORVASTATIN CALCIUM 40 MILLIGRAM(S): 80 TABLET, FILM COATED ORAL at 21:02

## 2024-01-05 RX ADMIN — DAPAGLIFLOZIN 10 MILLIGRAM(S): 10 TABLET, FILM COATED ORAL at 17:12

## 2024-01-05 RX ADMIN — Medication 1: at 21:49

## 2024-01-05 RX ADMIN — CLOPIDOGREL BISULFATE 75 MILLIGRAM(S): 75 TABLET, FILM COATED ORAL at 07:46

## 2024-01-05 RX ADMIN — SODIUM CHLORIDE 75 MILLILITER(S): 9 INJECTION INTRAMUSCULAR; INTRAVENOUS; SUBCUTANEOUS at 10:23

## 2024-01-05 RX ADMIN — MAGNESIUM OXIDE 400 MG ORAL TABLET 800 MILLIGRAM(S): 241.3 TABLET ORAL at 10:24

## 2024-01-05 RX ADMIN — Medication 650 MILLIGRAM(S): at 18:58

## 2024-01-05 RX ADMIN — Medication 4: at 08:38

## 2024-01-05 RX ADMIN — Medication 81 MILLIGRAM(S): at 07:46

## 2024-01-05 RX ADMIN — SODIUM CHLORIDE 120 MILLILITER(S): 9 INJECTION INTRAMUSCULAR; INTRAVENOUS; SUBCUTANEOUS at 17:33

## 2024-01-05 NOTE — PROGRESS NOTE ADULT - PROBLEM SELECTOR PLAN 1
presents w/ substernal CP at rest, improved w/ ASA 81mg x 2, currently CP free and HD stable  - hsTrop T 20-->22, , CKMB 3.2  - EKG non ischemic  - TTE 5/18/23: LVEF 49%, mild-mod LVH, G1DD, LA/RA mod dilated, interatrial septum aneurysm, mild MR  - Per outpt cardiologist, pt was found to have new WMA in basal inferior walls at [facility], f/u repeat TTE   - recent [facility] 7/14/23: MARTINA/Scoreflex pOM1 (80%), Scoreflex PTCA mLAD ISR (75%), pRCA 20%, RPDA mild diffuse  - NPO for [facility] today 1/5  - Continue ASA 81mg QD, Plavix 75mg QD, Lipitor 40mg HS, Imdur 60mg QD, and Toprol 25mg QD presents w/ substernal CP at rest, improved w/ ASA 81mg x 2, currently CP free and HD stable  - hsTrop T 20-->22, , CKMB 3.2  - EKG non ischemic  - TTE 5/18/23: LVEF 49%, mild-mod LVH, G1DD, LA/RA mod dilated, interatrial septum aneurysm, mild MR  - Per outpt cardiologist, pt was found to have new WMA in basal inferior walls at [facility], f/u repeat TTE   - recent [facility] 7/14/23: MARTINA/Scoreflex pOM1 (80%), Scoreflex PTCA mLAD ISR (75%), pRCA 20%, RPDA mild diffuse  - NPO for [facility] today 1/5  - Continue ASA 81mg QD, Plavix 75mg QD, Lipitor 40mg HS, Imdur 60mg QD, and Toprol 25mg QD presented w/ substernal CP at rest, improved w/ ASA 81mg x 2, currently CP free and HD stable  - hsTrop T 20-->22, , CKMB 3.2  - EKG non ischemic  - TTE 5/18/23: LVEF 49%, mild-mod LVH, G1DD, LA/RA mod dilated, interatrial septum aneurysm, mild MR  - Per outpt cardiologist, pt was found to have new WMA in basal inferior walls at [facility], f/u repeat TTE   - recent [facility] 7/14/23: MARTINA/Scoreflex pOM1 (80%), Scoreflex PTCA mLAD ISR (75%), pRCA 20%, RPDA mild diffuse  - NPO for [facility] today 1/5  - Continue ASA 81mg QD, Plavix 75mg QD, Lipitor 40mg HS, Imdur 60mg QD, and Toprol 25mg QD presented w/ substernal CP at rest, improved w/ ASA 81mg x 2, currently CP free and HD stable  - hsTrop T 20-->22, , CKMB 3.2  - EKG non ischemic  - TTE 5/18/23: LVEF 49%, mild-mod LVH, G1DD, LA/RA mod dilated, interatrial septum aneurysm, mild MR  - Per outpt cardiologist, pt was found to have new WMA in basal inferior walls at [facility], f/u repeat TTE   - recent [facility] 7/14/23: MARTINA/Scoreflex pOM1 (80%), Scoreflex PTCA mLAD ISR (75%), pRCA 20%, RPDA mild diffuse  - NPO for [facility] today 1/5  - Continue ASA 81mg QD, Plavix 75mg QD, Lipitor 40mg HS, Imdur 60mg QD, and Toprol 25mg QD

## 2024-01-05 NOTE — CONSULT NOTE ADULT - SUBJECTIVE AND OBJECTIVE BOX
HISTORY OF PRESENT ILLNESS  JOSE PABLO is a 88y Female with a past medical history of     Endocrinology has been consulted for Diabetes Management.    DIABETES HISTORY  - Age at diagnosis:  59 years old.  - Current Therapy:  - History of other regimens:  She was previously on Januvia and Actos, discontinued ~5 years ago when she was started on a long-acting insulin.   - History of hypoglycemia:   - History of DKA/HHS:   - Diabetic Complications:   - Home glucose readings:  - Diet:          > Breakfast:         > Lunch:        > Dinner:        > Snacks:  - Physical activity:    - Diabetes managed outpatient by:  pt seen by our service in 11/22, was on lantus 6 units with lispro 5 for meals    FAMILY HISTORY  - Diabetes:  - Thyroid:  - Autoimmune:  - Other:    SOCIAL HISTORY  - Work:  - Alcohol:  - Smoking:  - Recreational Drugs:    ALLERGIES  Ranexa (Other)  diltiazem (Other)  No Known Allergies      CURRENT MEDICATIONS  aspirin enteric coated 81 milliGRAM(s) Oral daily  atorvastatin 40 milliGRAM(s) Oral at bedtime  clopidogrel Tablet 75 milliGRAM(s) Oral daily  dapagliflozin 10 milliGRAM(s) Oral every 24 hours  insulin glargine Injectable (LANTUS) 8 Unit(s) SubCutaneous at bedtime  insulin lispro (ADMELOG) corrective regimen sliding scale   SubCutaneous Before meals and at bedtime  insulin lispro Injectable (ADMELOG) 2 Unit(s) SubCutaneous three times a day before meals  isosorbide   mononitrate ER Tablet (IMDUR) 60 milliGRAM(s) Oral daily  levothyroxine 50 MICROGram(s) Oral daily  losartan 25 milliGRAM(s) Oral daily  metoprolol succinate ER 25 milliGRAM(s) Oral daily  pantoprazole    Tablet 40 milliGRAM(s) Oral before breakfast  sodium chloride 0.9%. 500 milliLiter(s) IV Continuous <Continuous>      REVIEW OF SYSTEMS  Constitutional:  Negative fever, chills or loss of appetite.  Eyes:  Negative blurry vision or double vision.  Cardiovascular:  Negative for chest pain or palpitations.  Respiratory:  Negative for cough, wheezing, or shortness of breath.   Gastrointestinal:  Negative for nausea, vomiting, diarrhea, constipation, or abdominal pain.  Genitourinary:  Negative frequency, urgency or dysuria.  Neurologic:  No headache, confusion, dizziness, lightheadedness.    PHYSICAL EXAM  Vital Signs Last 24 Hrs  T(C): 36.6 (05 Jan 2024 08:30), Max: 36.9 (04 Jan 2024 17:27)  T(F): 97.8 (05 Jan 2024 08:30), Max: 98.4 (04 Jan 2024 17:27)  HR: 72 (05 Jan 2024 08:30) (72 - 91)  BP: 168/72 (05 Jan 2024 08:30) (133/66 - 207/86)  BP(mean): 103 (05 Jan 2024 08:30) (103 - 103)  RR: 18 (05 Jan 2024 08:30) (17 - 20)  SpO2: 98% (05 Jan 2024 08:30) (96% - 100%)    Parameters below as of 05 Jan 2024 08:30  Patient On (Oxygen Delivery Method): room air    Constitutional: Awake, alert, in no acute distress.   HEENT: Normocephalic, atraumatic, GRISELDA, no proptosis or lid retraction.   Neck: supple, no acanthosis, no thyromegaly or palpable thyroid nodules.  Respiratory: Lungs clear to ausculation bilaterally.   Cardiovascular: regular rhythm, normal S1 and S2, no audible murmurs.   GI: soft, non-tender, non-distended, bowel sounds present, no masses appreciated.  Extremities: No lower extremity edema, peripheral pulses present.   Skin: no rashes.   Psychiatric: AAO x 3. Normal affect/mood.     LABS  CBC - WBC/HGB/HTC/PLT: 10.05/10.1/29.3/218 (01-05-24)  BMP: Na/K/Cl/Bicarb/BUN/Cr/Gluc: 136/4.0/109/17/30/0.71/361 (01-05-24)  Anion Gap: 10 (01-05-24)  eGFR: 82 (01-05-24)  Calcium: 8.0 (01-05-24)  Phosphorus: -- (01-05-24)  Magnesium: 1.8 (01-05-24)  LFT - Alb/Tprot/Tbili/Dbili/AlkPhos/ALT/AST: 2.3/--/0.2/--/72/8/11 (01-05-24)  PT/aPTT/INR: 10.9/27.1/0.95 (01-05-24)  Thyroid Stimulating Hormone, Serum: 2.080 (01-05-24)  Total T4/Free T4: 7.09/-- (01-05-24)  CBC - WBC/HGB/HTC/PLT: 10.05/10.1/29.3/218 (01-05-24)BMP: Na/K/Cl/Bicarb/BUN/Cr/Gluc: 136/4.0/109/17/30/0.71/361 (01-05-24)  Anion Gap: 10 (01-05-24)  eGFR: 82 (01-05-24)  Calcium: 8.0 (01-05-24)  Phosphorus: -- (01-05-24)  Magnesium: 1.8 (01-05-24)    CAPILLARY BLOOD GLUCOSE & INSULIN RECEIVED  302 mg/dL (01-05 @ 08:07)        01-04-24 @ 07:01  -  01-05-24 @ 07:00  --------------------------------------------------------  IN: 0 mL / OUT: 0 mL / NET: 0 mL        ASSESSMENT / RECOMMENDATIONS    A1C: 10.1 %  BUN: 30  Creatinine: 0.71  GFR: 82  Weight: 41.8  BMI: 19.9  EF:     # Type 2 diabetes mellitus with hyperglycemia  - Please keep lantus   units at bedtime.   - Keep lispro   units before each meal.  - Continue lispro moderate dose sliding scale before meals and at bedtime.  - Patient's fingerstick glucose goal is 100-180 mg/dL.    - Discharge recommendations to be discussed.   - Patient can follow up at discharge with [facility] by calling [phone] to make an appointment.      Case discussed with Dr. Cotto. Primary team updated.       Katie Rodríguez  Endocrinology Fellow    Service Pager: [phone]  HISTORY OF PRESENT ILLNESS  JOSE PABLO is a 88y Female with a past medical history of     Endocrinology has been consulted for Diabetes Management.    DIABETES HISTORY  - Age at diagnosis:  59 years old.  - Current Therapy:  - History of other regimens:  She was previously on Januvia and Actos, discontinued ~5 years ago when she was started on a long-acting insulin.   - History of hypoglycemia:   - History of DKA/HHS:   - Diabetic Complications:   - Home glucose readings:  - Diet:          > Breakfast:         > Lunch:        > Dinner:        > Snacks:  - Physical activity:    - Diabetes managed outpatient by:  pt seen by our service in 11/22, was on lantus 6 units with lispro 5 for meals    FAMILY HISTORY  - Diabetes:  - Thyroid:  - Autoimmune:  - Other:    SOCIAL HISTORY  - Work:  - Alcohol:  - Smoking:  - Recreational Drugs:    ALLERGIES  Ranexa (Other)  diltiazem (Other)  No Known Allergies      CURRENT MEDICATIONS  aspirin enteric coated 81 milliGRAM(s) Oral daily  atorvastatin 40 milliGRAM(s) Oral at bedtime  clopidogrel Tablet 75 milliGRAM(s) Oral daily  dapagliflozin 10 milliGRAM(s) Oral every 24 hours  insulin glargine Injectable (LANTUS) 8 Unit(s) SubCutaneous at bedtime  insulin lispro (ADMELOG) corrective regimen sliding scale   SubCutaneous Before meals and at bedtime  insulin lispro Injectable (ADMELOG) 2 Unit(s) SubCutaneous three times a day before meals  isosorbide   mononitrate ER Tablet (IMDUR) 60 milliGRAM(s) Oral daily  levothyroxine 50 MICROGram(s) Oral daily  losartan 25 milliGRAM(s) Oral daily  metoprolol succinate ER 25 milliGRAM(s) Oral daily  pantoprazole    Tablet 40 milliGRAM(s) Oral before breakfast  sodium chloride 0.9%. 500 milliLiter(s) IV Continuous <Continuous>      REVIEW OF SYSTEMS  Constitutional:  Negative fever, chills or loss of appetite.  Eyes:  Negative blurry vision or double vision.  Cardiovascular:  Negative for chest pain or palpitations.  Respiratory:  Negative for cough, wheezing, or shortness of breath.   Gastrointestinal:  Negative for nausea, vomiting, diarrhea, constipation, or abdominal pain.  Genitourinary:  Negative frequency, urgency or dysuria.  Neurologic:  No headache, confusion, dizziness, lightheadedness.    PHYSICAL EXAM  Vital Signs Last 24 Hrs  T(C): 36.6 (05 Jan 2024 08:30), Max: 36.9 (04 Jan 2024 17:27)  T(F): 97.8 (05 Jan 2024 08:30), Max: 98.4 (04 Jan 2024 17:27)  HR: 72 (05 Jan 2024 08:30) (72 - 91)  BP: 168/72 (05 Jan 2024 08:30) (133/66 - 207/86)  BP(mean): 103 (05 Jan 2024 08:30) (103 - 103)  RR: 18 (05 Jan 2024 08:30) (17 - 20)  SpO2: 98% (05 Jan 2024 08:30) (96% - 100%)    Parameters below as of 05 Jan 2024 08:30  Patient On (Oxygen Delivery Method): room air    Constitutional: Awake, alert, in no acute distress.   HEENT: Normocephalic, atraumatic, GRISELDA, no proptosis or lid retraction.   Neck: supple, no acanthosis, no thyromegaly or palpable thyroid nodules.  Respiratory: Lungs clear to ausculation bilaterally.   Cardiovascular: regular rhythm, normal S1 and S2, no audible murmurs.   GI: soft, non-tender, non-distended, bowel sounds present, no masses appreciated.  Extremities: No lower extremity edema, peripheral pulses present.   Skin: no rashes.   Psychiatric: AAO x 3. Normal affect/mood.     LABS  CBC - WBC/HGB/HTC/PLT: 10.05/10.1/29.3/218 (01-05-24)  BMP: Na/K/Cl/Bicarb/BUN/Cr/Gluc: 136/4.0/109/17/30/0.71/361 (01-05-24)  Anion Gap: 10 (01-05-24)  eGFR: 82 (01-05-24)  Calcium: 8.0 (01-05-24)  Phosphorus: -- (01-05-24)  Magnesium: 1.8 (01-05-24)  LFT - Alb/Tprot/Tbili/Dbili/AlkPhos/ALT/AST: 2.3/--/0.2/--/72/8/11 (01-05-24)  PT/aPTT/INR: 10.9/27.1/0.95 (01-05-24)  Thyroid Stimulating Hormone, Serum: 2.080 (01-05-24)  Total T4/Free T4: 7.09/-- (01-05-24)  CBC - WBC/HGB/HTC/PLT: 10.05/10.1/29.3/218 (01-05-24)BMP: Na/K/Cl/Bicarb/BUN/Cr/Gluc: 136/4.0/109/17/30/0.71/361 (01-05-24)  Anion Gap: 10 (01-05-24)  eGFR: 82 (01-05-24)  Calcium: 8.0 (01-05-24)  Phosphorus: -- (01-05-24)  Magnesium: 1.8 (01-05-24)    CAPILLARY BLOOD GLUCOSE & INSULIN RECEIVED  302 mg/dL (01-05 @ 08:07)        01-04-24 @ 07:01  -  01-05-24 @ 07:00  --------------------------------------------------------  IN: 0 mL / OUT: 0 mL / NET: 0 mL        ASSESSMENT / RECOMMENDATIONS    A1C: 10.1 %  BUN: 30  Creatinine: 0.71  GFR: 82  Weight: 41.8  BMI: 19.9  EF:     # Type 2 diabetes mellitus with hyperglycemia  - Please keep lantus   units at bedtime.   - Keep lispro   units before each meal.  - Continue lispro moderate dose sliding scale before meals and at bedtime.  - Patient's fingerstick glucose goal is 100-180 mg/dL.    - Discharge recommendations to be discussed.   - Patient can follow up at discharge with [facility] by calling [phone] to make an appointment.      Case discussed with Dr. Cotto. Primary team updated.       Katie Rodríguez  Endocrinology Fellow    Service Pager: [phone]  HISTORY OF PRESENT ILLNESS  JOSE PABLO is a 88y Female with a past medical history of CAD s/p multiple PCI, HTN, HLD, chronic HF with EF 49%, hypothyroidism, Glaucoma, recent hospitalization with Covid at [facility] presented to [facility] on 01/04 with SOB and exertional chest pain.  Pt has been admitted under cardiology for unstable angina.    Daughter endorses weight loss of 5 lbs and confusion for the patient    TTE was done outpatient that showed borderline low EF with wall motion abnormality in the basal inferior wall which is new from before.  Pt is undergoing cath on 01/05.      Endocrinology has been consulted for Diabetes Management.    Today, labs are significant for CO2 17, BUN 30, glucose 361, a1c 10.1, TSH 2.08.    DIABETES HISTORY - obtained by the daughter  - Age at diagnosis:  59 years old.  - Diabetes managed outpatient by: Melissa Jerome NP  - Current Therapy: Farxiga 10 +  Recommend the following by CAROLYN Jerome to c/w Basaglar 8 units nightly, to increase dinner Novolog dosing by 1 unit with scale  to: If pre-meal sugar <100, 2 unit, 100-150: 4 units, 151-200: 6 units, 201-250: 7 units, 251-300: 8 units, >300: 9 units)  -- IF 2H postprandial continue to be >225, to increase scale by 1 unit  Daughter states she only gives Basaglar 8 units if glucose higher than 300.  night sugar 200: 4 units --> wake up with   NP Melissa spoke to the patient and daughter on 12/11 and the daughter reports  Endorses improvement of BG  -- fasting -147 on basaglar 8 units  -- 1-2H after lunchtime 170-low 200  -- 2H after dinner 200s, rare >300    - History of other regimens:   metformin 1000mg, stopped with starting MDI and GI upset, Januvia, actos, glyburide- History of hypoglycemia:   - History of DKA/HHS:   - Diabetic Complications:   - Home glucose readings reported by the daughter at this visit:  average glucose in the morning 96 - 120  meals glucose average:  300-350  night average glucose: 350  - Diet:          > Breakfast: oatmeal, tuna, wheat toast, scrambled eggs        > Lunch: chicken salad, ramandeep salad, a little bit of rice, small portion of fruits (apples, pears)        > Dinner: pasta, rice, vegetables, brocooli, green beans, lettuce and tomatoes        > Snacks:  yogurt with fruits (pineapples) and granola, denies soda, drinks orange juice      Takes levothyroxine 50mcg daily    FAMILY HISTORY  - Diabetes: diabetes    SOCIAL HISTORY  - Alcohol: denies  - Smoking: denies    ALLERGIES  Ranexa (Other)  diltiazem (Other)  No Known Allergies      CURRENT MEDICATIONS  aspirin enteric coated 81 milliGRAM(s) Oral daily  atorvastatin 40 milliGRAM(s) Oral at bedtime  clopidogrel Tablet 75 milliGRAM(s) Oral daily  dapagliflozin 10 milliGRAM(s) Oral every 24 hours  insulin glargine Injectable (LANTUS) 8 Unit(s) SubCutaneous at bedtime  insulin lispro (ADMELOG) corrective regimen sliding scale   SubCutaneous Before meals and at bedtime  insulin lispro Injectable (ADMELOG) 2 Unit(s) SubCutaneous three times a day before meals  isosorbide   mononitrate ER Tablet (IMDUR) 60 milliGRAM(s) Oral daily  levothyroxine 50 MICROGram(s) Oral daily  losartan 25 milliGRAM(s) Oral daily  metoprolol succinate ER 25 milliGRAM(s) Oral daily  pantoprazole    Tablet 40 milliGRAM(s) Oral before breakfast  sodium chloride 0.9%. 500 milliLiter(s) IV Continuous <Continuous>    PHYSICAL EXAM  Vital Signs Last 24 Hrs  T(C): 36.6 (05 Jan 2024 08:30), Max: 36.9 (04 Jan 2024 17:27)  T(F): 97.8 (05 Jan 2024 08:30), Max: 98.4 (04 Jan 2024 17:27)  HR: 72 (05 Jan 2024 08:30) (72 - 91)  BP: 168/72 (05 Jan 2024 08:30) (133/66 - 207/86)  BP(mean): 103 (05 Jan 2024 08:30) (103 - 103)  RR: 18 (05 Jan 2024 08:30) (17 - 20)  SpO2: 98% (05 Jan 2024 08:30) (96% - 100%)    Parameters below as of 05 Jan 2024 08:30  Patient On (Oxygen Delivery Method): room air    Constitutional: Awake, alert, in no acute distress.   HEENT: Normocephalic, atraumatic, GRISELDA, no proptosis or lid retraction.   Neck: supple, no acanthosis, no thyromegaly or palpable thyroid nodules.  Respiratory: Lungs clear to ausculation bilaterally.   Cardiovascular: regular rhythm, normal S1 and S2, no audible murmurs.   GI: soft, non-tender, non-distended, bowel sounds present, no masses appreciated.  Extremities: No lower extremity edema, peripheral pulses present.   Skin: no rashes.   Psychiatric: AAO x 3. Normal affect/mood.     LABS  CBC - WBC/HGB/HTC/PLT: 10.05/10.1/29.3/218 (01-05-24)  BMP: Na/K/Cl/Bicarb/BUN/Cr/Gluc: 136/4.0/109/17/30/0.71/361 (01-05-24)  Anion Gap: 10 (01-05-24)  eGFR: 82 (01-05-24)  Calcium: 8.0 (01-05-24)  Phosphorus: -- (01-05-24)  Magnesium: 1.8 (01-05-24)  LFT - Alb/Tprot/Tbili/Dbili/AlkPhos/ALT/AST: 2.3/--/0.2/--/72/8/11 (01-05-24)  PT/aPTT/INR: 10.9/27.1/0.95 (01-05-24)  Thyroid Stimulating Hormone, Serum: 2.080 (01-05-24)  Total T4/Free T4: 7.09/-- (01-05-24)  CBC - WBC/HGB/HTC/PLT: 10.05/10.1/29.3/218 (01-05-24)BMP: Na/K/Cl/Bicarb/BUN/Cr/Gluc: 136/4.0/109/17/30/0.71/361 (01-05-24)  Anion Gap: 10 (01-05-24)  eGFR: 82 (01-05-24)  Calcium: 8.0 (01-05-24)  Phosphorus: -- (01-05-24)  Magnesium: 1.8 (01-05-24)    CAPILLARY BLOOD GLUCOSE & INSULIN RECEIVED  302 mg/dL (01-05 @ 08:07)        01-04-24 @ 07:01  -  01-05-24 @ 07:00  --------------------------------------------------------  IN: 0 mL / OUT: 0 mL / NET: 0 mL        ASSESSMENT / RECOMMENDATIONS    JOSE PABLO is a 88y Female with a past medical history of CAD s/p multiple PCI, HTN, HLD, chronic HF with EF 49%, hypothyroidism, Glaucoma, recent hospitalization with Covid at [facility] presented to [facility] on 01/04 with SOB and exertional chest pain.  Pt has been admitted under cardiology for unstable angina.  TTE was done outpatient that showed borderline low EF with wall motion abnormality in the basal inferior wall which is new from before.  Pt is undergoing cath on 01/05.      Endocrinology has been consulted for Diabetes Management.    A1C: 10.1 %, 10.2 in Nov  BUN: 30  Creatinine: 0.71  GFR: 82  Weight: 41.8  BMI: 19.9    # Type 2 diabetes mellitus with hyperglycemia  - Please keep lantus 8 units at bedtime.   - Keep lispro 3  units before each meal.  - Continue lispro moderate dose sliding scale before meals and at bedtime.  - Patient's fingerstick glucose goal is 100-180 mg/dL.    - Discharge recommendations to be discussed.   - Patient can follow up at discharge with [facility] by calling [phone] to make an appointment.    # hypothyroidism  - daughter states levothyroxine reduced to 50mcg  - TSH 2.080  - c/w levothyroxine 50 mcg    Case discussed with Dr. Cotto. Primary team updated.       Katie Rodríguez  Endocrinology Fellow    Service Pager: [phone]  HISTORY OF PRESENT ILLNESS  JOSE PABLO is a 88y Female with a past medical history of CAD s/p multiple PCI, HTN, HLD, chronic HF with EF 49%, hypothyroidism, Glaucoma, recent hospitalization with Covid at [facility] presented to [facility] on 01/04 with SOB and exertional chest pain.  Pt has been admitted under cardiology for unstable angina.    Daughter endorses weight loss of 5 lbs and confusion for the patient    TTE was done outpatient that showed borderline low EF with wall motion abnormality in the basal inferior wall which is new from before.  Pt is undergoing cath on 01/05.      Endocrinology has been consulted for Diabetes Management.    Today, labs are significant for CO2 17, BUN 30, glucose 361, a1c 10.1, TSH 2.08.    DIABETES HISTORY - obtained by the daughter  - Age at diagnosis:  59 years old.  - Diabetes managed outpatient by: Melissa Jerome NP  - Current Therapy: Farxiga 10 +  Recommend the following by CAROLYN Jerome to c/w Basaglar 8 units nightly, to increase dinner Novolog dosing by 1 unit with scale  to: If pre-meal sugar <100, 2 unit, 100-150: 4 units, 151-200: 6 units, 201-250: 7 units, 251-300: 8 units, >300: 9 units)  -- IF 2H postprandial continue to be >225, to increase scale by 1 unit  Daughter states she only gives Basaglar 8 units if glucose higher than 300.  night sugar 200: 4 units --> wake up with   NP Melissa spoke to the patient and daughter on 12/11 and the daughter reports  Endorses improvement of BG  -- fasting -147 on basaglar 8 units  -- 1-2H after lunchtime 170-low 200  -- 2H after dinner 200s, rare >300    - History of other regimens:   metformin 1000mg, stopped with starting MDI and GI upset, Januvia, actos, glyburide- History of hypoglycemia:   - History of DKA/HHS:   - Diabetic Complications:   - Home glucose readings reported by the daughter at this visit:  average glucose in the morning 96 - 120  meals glucose average:  300-350  night average glucose: 350  - Diet:          > Breakfast: oatmeal, tuna, wheat toast, scrambled eggs        > Lunch: chicken salad, ramandeep salad, a little bit of rice, small portion of fruits (apples, pears)        > Dinner: pasta, rice, vegetables, brocooli, green beans, lettuce and tomatoes        > Snacks:  yogurt with fruits (pineapples) and granola, denies soda, drinks orange juice      Takes levothyroxine 50mcg daily    FAMILY HISTORY  - Diabetes: diabetes    SOCIAL HISTORY  - Alcohol: denies  - Smoking: denies    ALLERGIES  Ranexa (Other)  diltiazem (Other)  No Known Allergies      CURRENT MEDICATIONS  aspirin enteric coated 81 milliGRAM(s) Oral daily  atorvastatin 40 milliGRAM(s) Oral at bedtime  clopidogrel Tablet 75 milliGRAM(s) Oral daily  dapagliflozin 10 milliGRAM(s) Oral every 24 hours  insulin glargine Injectable (LANTUS) 8 Unit(s) SubCutaneous at bedtime  insulin lispro (ADMELOG) corrective regimen sliding scale   SubCutaneous Before meals and at bedtime  insulin lispro Injectable (ADMELOG) 2 Unit(s) SubCutaneous three times a day before meals  isosorbide   mononitrate ER Tablet (IMDUR) 60 milliGRAM(s) Oral daily  levothyroxine 50 MICROGram(s) Oral daily  losartan 25 milliGRAM(s) Oral daily  metoprolol succinate ER 25 milliGRAM(s) Oral daily  pantoprazole    Tablet 40 milliGRAM(s) Oral before breakfast  sodium chloride 0.9%. 500 milliLiter(s) IV Continuous <Continuous>    PHYSICAL EXAM  Vital Signs Last 24 Hrs  T(C): 36.6 (05 Jan 2024 08:30), Max: 36.9 (04 Jan 2024 17:27)  T(F): 97.8 (05 Jan 2024 08:30), Max: 98.4 (04 Jan 2024 17:27)  HR: 72 (05 Jan 2024 08:30) (72 - 91)  BP: 168/72 (05 Jan 2024 08:30) (133/66 - 207/86)  BP(mean): 103 (05 Jan 2024 08:30) (103 - 103)  RR: 18 (05 Jan 2024 08:30) (17 - 20)  SpO2: 98% (05 Jan 2024 08:30) (96% - 100%)    Parameters below as of 05 Jan 2024 08:30  Patient On (Oxygen Delivery Method): room air    Constitutional: Awake, alert, in no acute distress.   HEENT: Normocephalic, atraumatic, GRISELDA, no proptosis or lid retraction.   Neck: supple, no acanthosis, no thyromegaly or palpable thyroid nodules.  Respiratory: Lungs clear to ausculation bilaterally.   Cardiovascular: regular rhythm, normal S1 and S2, no audible murmurs.   GI: soft, non-tender, non-distended, bowel sounds present, no masses appreciated.  Extremities: No lower extremity edema, peripheral pulses present.   Skin: no rashes.   Psychiatric: AAO x 3. Normal affect/mood.     LABS  CBC - WBC/HGB/HTC/PLT: 10.05/10.1/29.3/218 (01-05-24)  BMP: Na/K/Cl/Bicarb/BUN/Cr/Gluc: 136/4.0/109/17/30/0.71/361 (01-05-24)  Anion Gap: 10 (01-05-24)  eGFR: 82 (01-05-24)  Calcium: 8.0 (01-05-24)  Phosphorus: -- (01-05-24)  Magnesium: 1.8 (01-05-24)  LFT - Alb/Tprot/Tbili/Dbili/AlkPhos/ALT/AST: 2.3/--/0.2/--/72/8/11 (01-05-24)  PT/aPTT/INR: 10.9/27.1/0.95 (01-05-24)  Thyroid Stimulating Hormone, Serum: 2.080 (01-05-24)  Total T4/Free T4: 7.09/-- (01-05-24)  CBC - WBC/HGB/HTC/PLT: 10.05/10.1/29.3/218 (01-05-24)BMP: Na/K/Cl/Bicarb/BUN/Cr/Gluc: 136/4.0/109/17/30/0.71/361 (01-05-24)  Anion Gap: 10 (01-05-24)  eGFR: 82 (01-05-24)  Calcium: 8.0 (01-05-24)  Phosphorus: -- (01-05-24)  Magnesium: 1.8 (01-05-24)    CAPILLARY BLOOD GLUCOSE & INSULIN RECEIVED  302 mg/dL (01-05 @ 08:07)        01-04-24 @ 07:01  -  01-05-24 @ 07:00  --------------------------------------------------------  IN: 0 mL / OUT: 0 mL / NET: 0 mL        ASSESSMENT / RECOMMENDATIONS    JOSE PABLO is a 88y Female with a past medical history of CAD s/p multiple PCI, HTN, HLD, chronic HF with EF 49%, hypothyroidism, Glaucoma, recent hospitalization with Covid at [facility] presented to [facility] on 01/04 with SOB and exertional chest pain.  Pt has been admitted under cardiology for unstable angina.  TTE was done outpatient that showed borderline low EF with wall motion abnormality in the basal inferior wall which is new from before.  Pt is undergoing cath on 01/05.      Endocrinology has been consulted for Diabetes Management.    A1C: 10.1 %, 10.2 in Nov  BUN: 30  Creatinine: 0.71  GFR: 82  Weight: 41.8  BMI: 19.9    # Type 2 diabetes mellitus with hyperglycemia  - Please keep lantus 8 units at bedtime.   - Keep lispro 3  units before each meal.  - Continue lispro moderate dose sliding scale before meals and at bedtime.  - Patient's fingerstick glucose goal is 100-180 mg/dL.    - Discharge recommendations to be discussed.   - Patient can follow up at discharge with [facility] by calling [phone] to make an appointment.    # hypothyroidism  - daughter states levothyroxine reduced to 50mcg  - TSH 2.080  - c/w levothyroxine 50 mcg    Case discussed with Dr. Cotto. Primary team updated.       Katie Rodríguez  Endocrinology Fellow    Service Pager: [phone]  HISTORY OF PRESENT ILLNESS  JOSE PABLO is a 88y Female with a past medical history of CAD s/p multiple PCI, HTN, HLD, chronic HF with EF 49%, hypothyroidism, Glaucoma, recent hospitalization with Covid at [facility] presented to [facility] on 01/04 with SOB and exertional chest pain.  Pt has been admitted under cardiology for unstable angina.    Daughter endorses weight loss of 5 lbs and confusion for the patient    TTE was done outpatient that showed borderline low EF with wall motion abnormality in the basal inferior wall which is new from before.  Pt is undergoing cath on 01/05.      Endocrinology has been consulted for Diabetes Management.    Today, labs are significant for CO2 17, BUN 30, glucose 361, a1c 10.1, TSH 2.08.    DIABETES HISTORY - obtained by the daughter  - Age at diagnosis:  59 years old.  - Diabetes managed outpatient by: Melissa Jerome NP  - Current Therapy: Farxiga 10 +  Recommend the following by CAROLYN Jerome to c/w Basaglar 8 units nightly, to increase dinner Novolog dosing by 1 unit with scale  to: If pre-meal sugar <100, 2 unit, 100-150: 4 units, 151-200: 6 units, 201-250: 7 units, 251-300: 8 units, >300: 9 units)  -- IF 2H postprandial continue to be >225, to increase scale by 1 unit  Daughter states she only gives Basaglar 8 units if glucose higher than 300.  night sugar 200: 4 units --> wake up with   NP Melissa spoke to the patient and daughter on 12/11 and the daughter reports  Endorses improvement of BG  -- fasting -147 on basaglar 8 units  -- 1-2H after lunchtime 170-low 200  -- 2H after dinner 200s, rare >300  - History of other regimens:   metformin 1000mg, stopped with starting MDI and GI upset, Januvia, actos, glyburide- History of hypoglycemia:    - Home glucose readings reported by the daughter at this visit:  average glucose in the morning 96 - 120  meals glucose average:  300-350  night average glucose: 350  - Diet:          > Breakfast: oatmeal, tuna, wheat toast, scrambled eggs        > Lunch: chicken salad, ramandeep salad, a little bit of rice, small portion of fruits (apples, pears)        > Dinner: pasta, rice, vegetables, brocooli, green beans, lettuce and tomatoes        > Snacks:  yogurt with fruits (pineapples) and granola, denies soda, drinks orange juice      Takes levothyroxine 50mcg daily    FAMILY HISTORY  - Diabetes: diabetes    SOCIAL HISTORY  - Alcohol: denies  - Smoking: denies    ALLERGIES  Ranexa (Other)  diltiazem (Other)  No Known Allergies      CURRENT MEDICATIONS  aspirin enteric coated 81 milliGRAM(s) Oral daily  atorvastatin 40 milliGRAM(s) Oral at bedtime  clopidogrel Tablet 75 milliGRAM(s) Oral daily  dapagliflozin 10 milliGRAM(s) Oral every 24 hours  insulin glargine Injectable (LANTUS) 8 Unit(s) SubCutaneous at bedtime  insulin lispro (ADMELOG) corrective regimen sliding scale   SubCutaneous Before meals and at bedtime  insulin lispro Injectable (ADMELOG) 2 Unit(s) SubCutaneous three times a day before meals  isosorbide   mononitrate ER Tablet (IMDUR) 60 milliGRAM(s) Oral daily  levothyroxine 50 MICROGram(s) Oral daily  losartan 25 milliGRAM(s) Oral daily  metoprolol succinate ER 25 milliGRAM(s) Oral daily  pantoprazole    Tablet 40 milliGRAM(s) Oral before breakfast  sodium chloride 0.9%. 500 milliLiter(s) IV Continuous <Continuous>    PHYSICAL EXAM  Vital Signs Last 24 Hrs  T(C): 36.6 (05 Jan 2024 08:30), Max: 36.9 (04 Jan 2024 17:27)  T(F): 97.8 (05 Jan 2024 08:30), Max: 98.4 (04 Jan 2024 17:27)  HR: 72 (05 Jan 2024 08:30) (72 - 91)  BP: 168/72 (05 Jan 2024 08:30) (133/66 - 207/86)  BP(mean): 103 (05 Jan 2024 08:30) (103 - 103)  RR: 18 (05 Jan 2024 08:30) (17 - 20)  SpO2: 98% (05 Jan 2024 08:30) (96% - 100%)    Parameters below as of 05 Jan 2024 08:30  Patient On (Oxygen Delivery Method): room air    Constitutional: Awake, alert, in no acute distress.   HEENT: Normocephalic, atraumatic, GRISELDA, no proptosis or lid retraction.   Neck: supple, no acanthosis, no thyromegaly or palpable thyroid nodules.  Respiratory: Lungs clear to ausculation bilaterally.   Cardiovascular: regular rhythm, normal S1 and S2, no audible murmurs.   GI: soft, non-tender, non-distended, bowel sounds present, no masses appreciated.  Extremities: No lower extremity edema, peripheral pulses present.   Skin: no rashes.   Psychiatric: AAO x 3. Normal affect/mood.     LABS  CBC - WBC/HGB/HTC/PLT: 10.05/10.1/29.3/218 (01-05-24)  BMP: Na/K/Cl/Bicarb/BUN/Cr/Gluc: 136/4.0/109/17/30/0.71/361 (01-05-24)  Anion Gap: 10 (01-05-24)  eGFR: 82 (01-05-24)  Calcium: 8.0 (01-05-24)  Phosphorus: -- (01-05-24)  Magnesium: 1.8 (01-05-24)  LFT - Alb/Tprot/Tbili/Dbili/AlkPhos/ALT/AST: 2.3/--/0.2/--/72/8/11 (01-05-24)  PT/aPTT/INR: 10.9/27.1/0.95 (01-05-24)  Thyroid Stimulating Hormone, Serum: 2.080 (01-05-24)  Total T4/Free T4: 7.09/-- (01-05-24)  CBC - WBC/HGB/HTC/PLT: 10.05/10.1/29.3/218 (01-05-24)BMP: Na/K/Cl/Bicarb/BUN/Cr/Gluc: 136/4.0/109/17/30/0.71/361 (01-05-24)  Anion Gap: 10 (01-05-24)  eGFR: 82 (01-05-24)  Calcium: 8.0 (01-05-24)  Phosphorus: -- (01-05-24)  Magnesium: 1.8 (01-05-24)    CAPILLARY BLOOD GLUCOSE & INSULIN RECEIVED  302 mg/dL (01-05 @ 08:07)        01-04-24 @ 07:01  -  01-05-24 @ 07:00  --------------------------------------------------------  IN: 0 mL / OUT: 0 mL / NET: 0 mL        ASSESSMENT / RECOMMENDATIONS    JOSE PABLO is a 88y Female with a past medical history of CAD s/p multiple PCI, HTN, HLD, chronic HF with EF 49%, hypothyroidism, Glaucoma, recent hospitalization with Covid at [facility] presented to [facility] on 01/04 with SOB and exertional chest pain.  Pt has been admitted under cardiology for unstable angina.  TTE was done outpatient that showed borderline low EF with wall motion abnormality in the basal inferior wall which is new from before.  Pt is undergoing cath on 01/05.      Endocrinology has been consulted for Diabetes Management.    A1C: 10.1 %, 10.2 in Nov  BUN: 30  Creatinine: 0.71  GFR: 82  Weight: 41.8  BMI: 19.9    # Type 2 diabetes mellitus with hyperglycemia  - Please keep lantus 8 units at bedtime.   - Keep lispro 3  units before each meal.  - Continue lispro moderate dose sliding scale before meals and at bedtime.  - Patient's fingerstick glucose goal is 100-180 mg/dL.    - Discharge recommendations to be discussed.   - Patient can follow up at discharge with [facility] by calling [phone] to make an appointment.    # hypothyroidism  - daughter states levothyroxine reduced to 50mcg  - TSH 2.080, check free T4  - c/w levothyroxine 50 mcg    Case discussed with Dr. Cotto. Primary team updated.       Katie Rodríguez  Endocrinology Fellow    Service Pager: [phone]  HISTORY OF PRESENT ILLNESS  JOSE PABLO is a 88y Female with a past medical history of CAD s/p multiple PCI, HTN, HLD, chronic HF with EF 49%, hypothyroidism, Glaucoma, recent hospitalization with Covid at [facility] presented to [facility] on 01/04 with SOB and exertional chest pain.  Pt has been admitted under cardiology for unstable angina.    Daughter endorses weight loss of 5 lbs and confusion for the patient    TTE was done outpatient that showed borderline low EF with wall motion abnormality in the basal inferior wall which is new from before.  Pt is undergoing cath on 01/05.      Endocrinology has been consulted for Diabetes Management.    Today, labs are significant for CO2 17, BUN 30, glucose 361, a1c 10.1, TSH 2.08.    DIABETES HISTORY - obtained by the daughter  - Age at diagnosis:  59 years old.  - Diabetes managed outpatient by: Melissa Jerome NP  - Current Therapy: Farxiga 10 +  Recommend the following by CAROLYN Jerome to c/w Basaglar 8 units nightly, to increase dinner Novolog dosing by 1 unit with scale  to: If pre-meal sugar <100, 2 unit, 100-150: 4 units, 151-200: 6 units, 201-250: 7 units, 251-300: 8 units, >300: 9 units)  -- IF 2H postprandial continue to be >225, to increase scale by 1 unit  Daughter states she only gives Basaglar 8 units if glucose higher than 300.  night sugar 200: 4 units --> wake up with   NP Melissa spoke to the patient and daughter on 12/11 and the daughter reports  Endorses improvement of BG  -- fasting -147 on basaglar 8 units  -- 1-2H after lunchtime 170-low 200  -- 2H after dinner 200s, rare >300  - History of other regimens:   metformin 1000mg, stopped with starting MDI and GI upset, Januvia, actos, glyburide- History of hypoglycemia:    - Home glucose readings reported by the daughter at this visit:  average glucose in the morning 96 - 120  meals glucose average:  300-350  night average glucose: 350  - Diet:          > Breakfast: oatmeal, tuna, wheat toast, scrambled eggs        > Lunch: chicken salad, ramandeep salad, a little bit of rice, small portion of fruits (apples, pears)        > Dinner: pasta, rice, vegetables, brocooli, green beans, lettuce and tomatoes        > Snacks:  yogurt with fruits (pineapples) and granola, denies soda, drinks orange juice      Takes levothyroxine 50mcg daily    FAMILY HISTORY  - Diabetes: diabetes    SOCIAL HISTORY  - Alcohol: denies  - Smoking: denies    ALLERGIES  Ranexa (Other)  diltiazem (Other)  No Known Allergies      CURRENT MEDICATIONS  aspirin enteric coated 81 milliGRAM(s) Oral daily  atorvastatin 40 milliGRAM(s) Oral at bedtime  clopidogrel Tablet 75 milliGRAM(s) Oral daily  dapagliflozin 10 milliGRAM(s) Oral every 24 hours  insulin glargine Injectable (LANTUS) 8 Unit(s) SubCutaneous at bedtime  insulin lispro (ADMELOG) corrective regimen sliding scale   SubCutaneous Before meals and at bedtime  insulin lispro Injectable (ADMELOG) 2 Unit(s) SubCutaneous three times a day before meals  isosorbide   mononitrate ER Tablet (IMDUR) 60 milliGRAM(s) Oral daily  levothyroxine 50 MICROGram(s) Oral daily  losartan 25 milliGRAM(s) Oral daily  metoprolol succinate ER 25 milliGRAM(s) Oral daily  pantoprazole    Tablet 40 milliGRAM(s) Oral before breakfast  sodium chloride 0.9%. 500 milliLiter(s) IV Continuous <Continuous>    PHYSICAL EXAM  Vital Signs Last 24 Hrs  T(C): 36.6 (05 Jan 2024 08:30), Max: 36.9 (04 Jan 2024 17:27)  T(F): 97.8 (05 Jan 2024 08:30), Max: 98.4 (04 Jan 2024 17:27)  HR: 72 (05 Jan 2024 08:30) (72 - 91)  BP: 168/72 (05 Jan 2024 08:30) (133/66 - 207/86)  BP(mean): 103 (05 Jan 2024 08:30) (103 - 103)  RR: 18 (05 Jan 2024 08:30) (17 - 20)  SpO2: 98% (05 Jan 2024 08:30) (96% - 100%)    Parameters below as of 05 Jan 2024 08:30  Patient On (Oxygen Delivery Method): room air    Constitutional: Awake, alert, in no acute distress.   HEENT: Normocephalic, atraumatic, GRISELDA, no proptosis or lid retraction.   Neck: supple, no acanthosis, no thyromegaly or palpable thyroid nodules.  Respiratory: Lungs clear to ausculation bilaterally.   Cardiovascular: regular rhythm, normal S1 and S2, no audible murmurs.   GI: soft, non-tender, non-distended, bowel sounds present, no masses appreciated.  Extremities: No lower extremity edema, peripheral pulses present.   Skin: no rashes.   Psychiatric: AAO x 3. Normal affect/mood.     LABS  CBC - WBC/HGB/HTC/PLT: 10.05/10.1/29.3/218 (01-05-24)  BMP: Na/K/Cl/Bicarb/BUN/Cr/Gluc: 136/4.0/109/17/30/0.71/361 (01-05-24)  Anion Gap: 10 (01-05-24)  eGFR: 82 (01-05-24)  Calcium: 8.0 (01-05-24)  Phosphorus: -- (01-05-24)  Magnesium: 1.8 (01-05-24)  LFT - Alb/Tprot/Tbili/Dbili/AlkPhos/ALT/AST: 2.3/--/0.2/--/72/8/11 (01-05-24)  PT/aPTT/INR: 10.9/27.1/0.95 (01-05-24)  Thyroid Stimulating Hormone, Serum: 2.080 (01-05-24)  Total T4/Free T4: 7.09/-- (01-05-24)  CBC - WBC/HGB/HTC/PLT: 10.05/10.1/29.3/218 (01-05-24)BMP: Na/K/Cl/Bicarb/BUN/Cr/Gluc: 136/4.0/109/17/30/0.71/361 (01-05-24)  Anion Gap: 10 (01-05-24)  eGFR: 82 (01-05-24)  Calcium: 8.0 (01-05-24)  Phosphorus: -- (01-05-24)  Magnesium: 1.8 (01-05-24)    CAPILLARY BLOOD GLUCOSE & INSULIN RECEIVED  302 mg/dL (01-05 @ 08:07)        01-04-24 @ 07:01  -  01-05-24 @ 07:00  --------------------------------------------------------  IN: 0 mL / OUT: 0 mL / NET: 0 mL        ASSESSMENT / RECOMMENDATIONS    JOSE PABLO is a 88y Female with a past medical history of CAD s/p multiple PCI, HTN, HLD, chronic HF with EF 49%, hypothyroidism, Glaucoma, recent hospitalization with Covid at [facility] presented to [facility] on 01/04 with SOB and exertional chest pain.  Pt has been admitted under cardiology for unstable angina.  TTE was done outpatient that showed borderline low EF with wall motion abnormality in the basal inferior wall which is new from before.  Pt is undergoing cath on 01/05.      Endocrinology has been consulted for Diabetes Management.    A1C: 10.1 %, 10.2 in Nov  BUN: 30  Creatinine: 0.71  GFR: 82  Weight: 41.8  BMI: 19.9    # Type 2 diabetes mellitus with hyperglycemia  - Please keep lantus 8 units at bedtime.   - Keep lispro 3  units before each meal.  - Continue lispro moderate dose sliding scale before meals and at bedtime.  - Patient's fingerstick glucose goal is 100-180 mg/dL.    - Discharge recommendations to be discussed.   - Patient can follow up at discharge with [facility] by calling [phone] to make an appointment.    # hypothyroidism  - daughter states levothyroxine reduced to 50mcg  - TSH 2.080, check free T4  - c/w levothyroxine 50 mcg    Case discussed with Dr. Cotto. Primary team updated.       Katie Rodríguez  Endocrinology Fellow    Service Pager: [phone]  HISTORY OF PRESENT ILLNESS  JOSE PABLO is a 88y Female with a past medical history of CAD s/p multiple PCI, HTN, HLD, chronic HF with EF 49%, hypothyroidism, Glaucoma, recent hospitalization with Covid at [facility] presented to [facility] on 01/04 with SOB and exertional chest pain.  Pt has been admitted under cardiology for unstable angina.    Daughter endorses weight loss of 5 lbs and confusion for the patient    TTE was done outpatient that showed borderline low EF with wall motion abnormality in the basal inferior wall which is new from before.  Pt is undergoing cath on 01/05.      Endocrinology has been consulted for Diabetes Management.    Today, labs are significant for CO2 17, BUN 30, glucose 361, a1c 10.1, TSH 2.08.    DIABETES HISTORY - obtained by the daughter  - Age at diagnosis:  59 years old.  - Diabetes managed outpatient by: Melissa Jerome NP  - Current Therapy: Farxiga 10 +  Recommend the following by CAROLYN Jerome to c/w Basaglar 8 units nightly, to increase dinner Novolog dosing by 1 unit with scale  to: If pre-meal sugar <100, 2 unit, 100-150: 4 units, 151-200: 6 units, 201-250: 7 units, 251-300: 8 units, >300: 9 units)  -- IF 2H postprandial continue to be >225, to increase scale by 1 unit  Daughter states she only gives Basaglar 8 units if glucose higher than 300.  night sugar 200: 4 units --> wake up with   NP Melissa spoke to the patient and daughter on 12/11 and the daughter reports  Endorses improvement of BG  -- fasting -147 on basaglar 8 units  -- 1-2H after lunchtime 170-low 200  -- 2H after dinner 200s, rare >300  - History of other regimens:   metformin 1000mg, stopped with starting MDI and GI upset, Januvia, actos, glyburide- History of hypoglycemia:    - Home glucose readings reported by the daughter at this visit:  average glucose in the morning 96 - 120  meals glucose average:  300-350  night average glucose: 350  - Diet:          > Breakfast: oatmeal, tuna, wheat toast, scrambled eggs        > Lunch: chicken salad, ramandeep salad, a little bit of rice, small portion of fruits (apples, pears)        > Dinner: pasta, rice, vegetables, brocooli, green beans, lettuce and tomatoes        > Snacks:  yogurt with fruits (pineapples) and granola, denies soda, drinks orange juice      Takes levothyroxine 50mcg daily    FAMILY HISTORY  - Diabetes: diabetes    SOCIAL HISTORY  - Alcohol: denies  - Smoking: denies    ALLERGIES  Ranexa (Other)  diltiazem (Other)  No Known Allergies      CURRENT MEDICATIONS  aspirin enteric coated 81 milliGRAM(s) Oral daily  atorvastatin 40 milliGRAM(s) Oral at bedtime  clopidogrel Tablet 75 milliGRAM(s) Oral daily  dapagliflozin 10 milliGRAM(s) Oral every 24 hours  insulin glargine Injectable (LANTUS) 8 Unit(s) SubCutaneous at bedtime  insulin lispro (ADMELOG) corrective regimen sliding scale   SubCutaneous Before meals and at bedtime  insulin lispro Injectable (ADMELOG) 2 Unit(s) SubCutaneous three times a day before meals  isosorbide   mononitrate ER Tablet (IMDUR) 60 milliGRAM(s) Oral daily  levothyroxine 50 MICROGram(s) Oral daily  losartan 25 milliGRAM(s) Oral daily  metoprolol succinate ER 25 milliGRAM(s) Oral daily  pantoprazole    Tablet 40 milliGRAM(s) Oral before breakfast  sodium chloride 0.9%. 500 milliLiter(s) IV Continuous <Continuous>    PHYSICAL EXAM  Vital Signs Last 24 Hrs  T(C): 36.6 (05 Jan 2024 08:30), Max: 36.9 (04 Jan 2024 17:27)  T(F): 97.8 (05 Jan 2024 08:30), Max: 98.4 (04 Jan 2024 17:27)  HR: 72 (05 Jan 2024 08:30) (72 - 91)  BP: 168/72 (05 Jan 2024 08:30) (133/66 - 207/86)  BP(mean): 103 (05 Jan 2024 08:30) (103 - 103)  RR: 18 (05 Jan 2024 08:30) (17 - 20)  SpO2: 98% (05 Jan 2024 08:30) (96% - 100%)    Parameters below as of 05 Jan 2024 08:30  Patient On (Oxygen Delivery Method): room air    Constitutional: Awake, alert, in no acute distress.   HEENT: Normocephalic, atraumatic, GRISELDA, no proptosis or lid retraction.   Neck: supple, no acanthosis, no thyromegaly or palpable thyroid nodules.  Respiratory: Lungs clear to ausculation bilaterally.   Cardiovascular: regular rhythm, normal S1 and S2, no audible murmurs.   GI: soft, non-tender, non-distended, bowel sounds present, no masses appreciated.  Extremities: No lower extremity edema, peripheral pulses present.   Skin: no rashes.   Psychiatric: AAO x 3. Normal affect/mood.     LABS  CBC - WBC/HGB/HTC/PLT: 10.05/10.1/29.3/218 (01-05-24)  BMP: Na/K/Cl/Bicarb/BUN/Cr/Gluc: 136/4.0/109/17/30/0.71/361 (01-05-24)  Anion Gap: 10 (01-05-24)  eGFR: 82 (01-05-24)  Calcium: 8.0 (01-05-24)  Phosphorus: -- (01-05-24)  Magnesium: 1.8 (01-05-24)  LFT - Alb/Tprot/Tbili/Dbili/AlkPhos/ALT/AST: 2.3/--/0.2/--/72/8/11 (01-05-24)  PT/aPTT/INR: 10.9/27.1/0.95 (01-05-24)  Thyroid Stimulating Hormone, Serum: 2.080 (01-05-24)  Total T4/Free T4: 7.09/-- (01-05-24)  CBC - WBC/HGB/HTC/PLT: 10.05/10.1/29.3/218 (01-05-24)BMP: Na/K/Cl/Bicarb/BUN/Cr/Gluc: 136/4.0/109/17/30/0.71/361 (01-05-24)  Anion Gap: 10 (01-05-24)  eGFR: 82 (01-05-24)  Calcium: 8.0 (01-05-24)  Phosphorus: -- (01-05-24)  Magnesium: 1.8 (01-05-24)    CAPILLARY BLOOD GLUCOSE & INSULIN RECEIVED  302 mg/dL (01-05 @ 08:07)        01-04-24 @ 07:01  -  01-05-24 @ 07:00  --------------------------------------------------------  IN: 0 mL / OUT: 0 mL / NET: 0 mL        ASSESSMENT / RECOMMENDATIONS    JOSE PABLO is a 88y Female with a past medical history of CAD s/p multiple PCI, HTN, HLD, chronic HF with EF 49%, hypothyroidism, Glaucoma, recent hospitalization with Covid at [facility] presented to [facility] on 01/04 with SOB and exertional chest pain.  Pt has been admitted under cardiology for unstable angina.  TTE was done outpatient that showed borderline low EF with wall motion abnormality in the basal inferior wall which is new from before.  Pt is undergoing cath on 01/05.      Endocrinology has been consulted for Diabetes Management.    A1C: 10.1 %, 10.2 in Nov  BUN: 30  Creatinine: 0.71  GFR: 82  Weight: 41.8  BMI: 19.9    # Type 2 diabetes mellitus with hyperglycemia  - Please keep lantus 8 units at bedtime.   - Keep lispro 3  units before each meal.  - Continue lispro low dose sliding scale before meals and at bedtime.  - please order ensure max with meals  - Patient's fingerstick glucose goal is 100-180 mg/dL.    - Discharge recommendations to be discussed.   - Patient can follow up at discharge with [facility] by calling [phone] to make an appointment.    # hypothyroidism  - daughter states levothyroxine reduced to 50mcg  - TSH 2.080, check free T4  - c/w levothyroxine 50 mcg    Case discussed with Dr. Cotto. Primary team updated.       Katie Rodríguez  Endocrinology Fellow    Service Pager: [phone]  HISTORY OF PRESENT ILLNESS  JOSE PABLO is a 88y Female with a past medical history of CAD s/p multiple PCI, HTN, HLD, chronic HF with EF 49%, hypothyroidism, Glaucoma, recent hospitalization with Covid at [facility] presented to [facility] on 01/04 with SOB and exertional chest pain.  Pt has been admitted under cardiology for unstable angina.    Daughter endorses weight loss of 5 lbs and confusion for the patient    TTE was done outpatient that showed borderline low EF with wall motion abnormality in the basal inferior wall which is new from before.  Pt is undergoing cath on 01/05.      Endocrinology has been consulted for Diabetes Management.    Today, labs are significant for CO2 17, BUN 30, glucose 361, a1c 10.1, TSH 2.08.    DIABETES HISTORY - obtained by the daughter  - Age at diagnosis:  59 years old.  - Diabetes managed outpatient by: Melissa Jerome NP  - Current Therapy: Farxiga 10 +  Recommend the following by CAROLYN Jerome to c/w Basaglar 8 units nightly, to increase dinner Novolog dosing by 1 unit with scale  to: If pre-meal sugar <100, 2 unit, 100-150: 4 units, 151-200: 6 units, 201-250: 7 units, 251-300: 8 units, >300: 9 units)  -- IF 2H postprandial continue to be >225, to increase scale by 1 unit  Daughter states she only gives Basaglar 8 units if glucose higher than 300.  night sugar 200: 4 units --> wake up with   NP Melissa spoke to the patient and daughter on 12/11 and the daughter reports  Endorses improvement of BG  -- fasting -147 on basaglar 8 units  -- 1-2H after lunchtime 170-low 200  -- 2H after dinner 200s, rare >300  - History of other regimens:   metformin 1000mg, stopped with starting MDI and GI upset, Januvia, actos, glyburide- History of hypoglycemia:    - Home glucose readings reported by the daughter at this visit:  average glucose in the morning 96 - 120  meals glucose average:  300-350  night average glucose: 350  - Diet:          > Breakfast: oatmeal, tuna, wheat toast, scrambled eggs        > Lunch: chicken salad, ramandeep salad, a little bit of rice, small portion of fruits (apples, pears)        > Dinner: pasta, rice, vegetables, brocooli, green beans, lettuce and tomatoes        > Snacks:  yogurt with fruits (pineapples) and granola, denies soda, drinks orange juice      Takes levothyroxine 50mcg daily    FAMILY HISTORY  - Diabetes: diabetes    SOCIAL HISTORY  - Alcohol: denies  - Smoking: denies    ALLERGIES  Ranexa (Other)  diltiazem (Other)  No Known Allergies      CURRENT MEDICATIONS  aspirin enteric coated 81 milliGRAM(s) Oral daily  atorvastatin 40 milliGRAM(s) Oral at bedtime  clopidogrel Tablet 75 milliGRAM(s) Oral daily  dapagliflozin 10 milliGRAM(s) Oral every 24 hours  insulin glargine Injectable (LANTUS) 8 Unit(s) SubCutaneous at bedtime  insulin lispro (ADMELOG) corrective regimen sliding scale   SubCutaneous Before meals and at bedtime  insulin lispro Injectable (ADMELOG) 2 Unit(s) SubCutaneous three times a day before meals  isosorbide   mononitrate ER Tablet (IMDUR) 60 milliGRAM(s) Oral daily  levothyroxine 50 MICROGram(s) Oral daily  losartan 25 milliGRAM(s) Oral daily  metoprolol succinate ER 25 milliGRAM(s) Oral daily  pantoprazole    Tablet 40 milliGRAM(s) Oral before breakfast  sodium chloride 0.9%. 500 milliLiter(s) IV Continuous <Continuous>    PHYSICAL EXAM  Vital Signs Last 24 Hrs  T(C): 36.6 (05 Jan 2024 08:30), Max: 36.9 (04 Jan 2024 17:27)  T(F): 97.8 (05 Jan 2024 08:30), Max: 98.4 (04 Jan 2024 17:27)  HR: 72 (05 Jan 2024 08:30) (72 - 91)  BP: 168/72 (05 Jan 2024 08:30) (133/66 - 207/86)  BP(mean): 103 (05 Jan 2024 08:30) (103 - 103)  RR: 18 (05 Jan 2024 08:30) (17 - 20)  SpO2: 98% (05 Jan 2024 08:30) (96% - 100%)    Parameters below as of 05 Jan 2024 08:30  Patient On (Oxygen Delivery Method): room air    Constitutional: Awake, alert, in no acute distress.   HEENT: Normocephalic, atraumatic, GRISELDA, no proptosis or lid retraction.   Neck: supple, no acanthosis, no thyromegaly or palpable thyroid nodules.  Respiratory: Lungs clear to ausculation bilaterally.   Cardiovascular: regular rhythm, normal S1 and S2, no audible murmurs.   GI: soft, non-tender, non-distended, bowel sounds present, no masses appreciated.  Extremities: No lower extremity edema, peripheral pulses present.   Skin: no rashes.   Psychiatric: AAO x 3. Normal affect/mood.     LABS  CBC - WBC/HGB/HTC/PLT: 10.05/10.1/29.3/218 (01-05-24)  BMP: Na/K/Cl/Bicarb/BUN/Cr/Gluc: 136/4.0/109/17/30/0.71/361 (01-05-24)  Anion Gap: 10 (01-05-24)  eGFR: 82 (01-05-24)  Calcium: 8.0 (01-05-24)  Phosphorus: -- (01-05-24)  Magnesium: 1.8 (01-05-24)  LFT - Alb/Tprot/Tbili/Dbili/AlkPhos/ALT/AST: 2.3/--/0.2/--/72/8/11 (01-05-24)  PT/aPTT/INR: 10.9/27.1/0.95 (01-05-24)  Thyroid Stimulating Hormone, Serum: 2.080 (01-05-24)  Total T4/Free T4: 7.09/-- (01-05-24)  CBC - WBC/HGB/HTC/PLT: 10.05/10.1/29.3/218 (01-05-24)BMP: Na/K/Cl/Bicarb/BUN/Cr/Gluc: 136/4.0/109/17/30/0.71/361 (01-05-24)  Anion Gap: 10 (01-05-24)  eGFR: 82 (01-05-24)  Calcium: 8.0 (01-05-24)  Phosphorus: -- (01-05-24)  Magnesium: 1.8 (01-05-24)    CAPILLARY BLOOD GLUCOSE & INSULIN RECEIVED  302 mg/dL (01-05 @ 08:07)        01-04-24 @ 07:01  -  01-05-24 @ 07:00  --------------------------------------------------------  IN: 0 mL / OUT: 0 mL / NET: 0 mL        ASSESSMENT / RECOMMENDATIONS    JOSE PABLO is a 88y Female with a past medical history of CAD s/p multiple PCI, HTN, HLD, chronic HF with EF 49%, hypothyroidism, Glaucoma, recent hospitalization with Covid at [facility] presented to [facility] on 01/04 with SOB and exertional chest pain.  Pt has been admitted under cardiology for unstable angina.  TTE was done outpatient that showed borderline low EF with wall motion abnormality in the basal inferior wall which is new from before.  Pt is undergoing cath on 01/05.      Endocrinology has been consulted for Diabetes Management.    A1C: 10.1 %, 10.2 in Nov  BUN: 30  Creatinine: 0.71  GFR: 82  Weight: 41.8  BMI: 19.9    # Type 2 diabetes mellitus with hyperglycemia  - Please keep lantus 8 units at bedtime.   - Keep lispro 3  units before each meal.  - Continue lispro low dose sliding scale before meals and at bedtime.  - please order ensure max with meals  - Patient's fingerstick glucose goal is 100-180 mg/dL.    - Discharge recommendations to be discussed.   - Patient can follow up at discharge with [facility] by calling [phone] to make an appointment.    # hypothyroidism  - daughter states levothyroxine reduced to 50mcg  - TSH 2.080, check free T4  - c/w levothyroxine 50 mcg    Case discussed with Dr. Cotto. Primary team updated.       Katie Rodríguez  Endocrinology Fellow    Service Pager: [phone]

## 2024-01-05 NOTE — PROGRESS NOTE ADULT - SUBJECTIVE AND OBJECTIVE BOX
INTERVAL HISTORY:  	  MEDICATIONS:  isosorbide   mononitrate ER Tablet (IMDUR) 60 milliGRAM(s) Oral daily  losartan 25 milliGRAM(s) Oral daily  metoprolol succinate ER 25 milliGRAM(s) Oral daily          pantoprazole    Tablet 40 milliGRAM(s) Oral before breakfast    atorvastatin 40 milliGRAM(s) Oral at bedtime  dapagliflozin 10 milliGRAM(s) Oral every 24 hours  insulin glargine Injectable (LANTUS) 8 Unit(s) SubCutaneous at bedtime  insulin lispro (ADMELOG) corrective regimen sliding scale   SubCutaneous Before meals and at bedtime  insulin lispro Injectable (ADMELOG) 2 Unit(s) SubCutaneous three times a day before meals  levothyroxine 50 MICROGram(s) Oral daily    aspirin enteric coated 81 milliGRAM(s) Oral daily  clopidogrel Tablet 75 milliGRAM(s) Oral daily  magnesium oxide 800 milliGRAM(s) Oral once  sodium chloride 0.9%. 500 milliLiter(s) IV Continuous <Continuous>      REVIEW OF SYSTEMS:    CONSTITUTIONAL: No fever, weight loss, or fatigue  EYES: No eye pain, visual disturbances, or discharge  ENMT:  No difficulty hearing, tinnitus, vertigo; No sinus or throat pain  NECK: No pain or stiffness  BREASTS: No pain, masses, or nipple discharge  RESPIRATORY: No cough, wheezing, chills or hemoptysis; No Shortness of Breath  CARDIOVASCULAR: No chest pain, palpitations, dizziness, or leg swelling  GASTROINTESTINAL: No abdominal or epigastric pain. No nausea, vomiting, or hematemesis; No diarrhea or constipation. No melena or hematochezia.  GENITOURINARY: No dysuria, frequency, hematuria, or incontinence  NEUROLOGICAL: No headaches, memory loss, loss of strength, numbness, or tremors  SKIN: No itching, burning, rashes, or lesions   LYMPH Nodes: No enlarged glands  ENDOCRINE: No heat or cold intolerance; No hair loss  MUSCULOSKELETAL: No joint pain or swelling; No muscle, back, or extremity pain  PSYCHIATRIC: No depression, anxiety, mood swings, or difficulty sleeping  HEME/LYMPH: No easy bruising, or bleeding gums  ALLERY AND IMMUNOLOGIC: No hives or eczema	    [ ] All others negative	  [ ] Unable to obtain    PHYSICAL EXAM:  T(C): 36.6 (01-05-24 @ 08:30), Max: 36.9 (01-04-24 @ 17:27)  HR: 72 (01-05-24 @ 08:30) (72 - 91)  BP: 168/72 (01-05-24 @ 08:30) (133/66 - 207/86)  RR: 18 (01-05-24 @ 08:30) (17 - 20)  SpO2: 98% (01-05-24 @ 08:30) (96% - 100%)  Wt(kg): --  I&O's Summary    04 Jan 2024 07:01  -  05 Jan 2024 07:00  --------------------------------------------------------  IN: 0 mL / OUT: 0 mL / NET: 0 mL      Height (cm): 144.8 (01-04 @ 21:40)  Weight (kg): 41.8 (01-04 @ 21:40)  BMI (kg/m2): 19.9 (01-04 @ 21:40)  BSA (m2): 1.29 (01-04 @ 21:40)    Appearance: Normal	  HEENT:   Normal oral mucosa, PERRL, EOMI	  Lymphatic: No lymphadenopathy  Cardiovascular: Normal S1 S2, No JVD, No murmurs, No edema  Respiratory: Lungs clear to auscultation	  Psychiatry: A & O x 3, Mood & affect appropriate  Gastrointestinal:  Soft, Non-tender, + BS	  Skin: No rashes, No ecchymoses, No cyanosis  Neurologic: Non-focal  Extremities: Normal range of motion, No clubbing, cyanosis or edema  Vascular: Peripheral pulses palpable 2+ bilaterally    TELEMETRY: 	    ECG:  	  RADIOLOGY:   DIAGNOSTIC TESTING:  [ ] Echocardiogram:  [ ]  Catheterization:  [ ] Stress Test:    OTHER: 	    LABS:	 	    CARDIAC MARKERS:                                  10.1   10.05 )-----------( 218      ( 05 Jan 2024 07:23 )             29.3     01-05    136  |  109<H>  |  30<H>  ----------------------------<  361<H>  4.0   |  17<L>  |  0.71    Ca    8.0<L>      05 Jan 2024 05:30  Mg     1.8     01-05    TPro  5.6<L>  /  Alb  2.3<L>  /  TBili  0.2  /  DBili  x   /  AST  11  /  ALT  8<L>  /  AlkPhos  72  01-05    proBNP:   Lipid Profile:   HgA1c:   TSH: Thyroid Stimulating Hormone, Serum: 2.080 uIU/mL (01-05 @ 05:30)      ASSESSMENT/PLAN: 	     INTERVAL HISTORY:  Pt seen at bedside this morning. Pt endorses slight chest pain intermittently but denies headache, SOB, blood in urine or stool.  	  MEDICATIONS:  isosorbide   mononitrate ER Tablet (IMDUR) 60 milliGRAM(s) Oral daily  losartan 25 milliGRAM(s) Oral daily  metoprolol succinate ER 25 milliGRAM(s) Oral daily          pantoprazole    Tablet 40 milliGRAM(s) Oral before breakfast    atorvastatin 40 milliGRAM(s) Oral at bedtime  dapagliflozin 10 milliGRAM(s) Oral every 24 hours  insulin glargine Injectable (LANTUS) 8 Unit(s) SubCutaneous at bedtime  insulin lispro (ADMELOG) corrective regimen sliding scale   SubCutaneous Before meals and at bedtime  insulin lispro Injectable (ADMELOG) 2 Unit(s) SubCutaneous three times a day before meals  levothyroxine 50 MICROGram(s) Oral daily    aspirin enteric coated 81 milliGRAM(s) Oral daily  clopidogrel Tablet 75 milliGRAM(s) Oral daily  magnesium oxide 800 milliGRAM(s) Oral once  sodium chloride 0.9%. 500 milliLiter(s) IV Continuous <Continuous>          PHYSICAL EXAM:  T(C): 36.6 (01-05-24 @ 08:30), Max: 36.9 (01-04-24 @ 17:27)  HR: 72 (01-05-24 @ 08:30) (72 - 91)  BP: 168/72 (01-05-24 @ 08:30) (133/66 - 207/86)  RR: 18 (01-05-24 @ 08:30) (17 - 20)  SpO2: 98% (01-05-24 @ 08:30) (96% - 100%)  Wt(kg): --  I&O's Summary    04 Jan 2024 07:01  -  05 Jan 2024 07:00  --------------------------------------------------------  IN: 0 mL / OUT: 0 mL / NET: 0 mL      Height (cm): 144.8 (01-04 @ 21:40)  Weight (kg): 41.8 (01-04 @ 21:40)  BMI (kg/m2): 19.9 (01-04 @ 21:40)  BSA (m2): 1.29 (01-04 @ 21:40)    Appearance: Normal	  HEENT:   Normal oral mucosa, PERRL, EOMI	  Lymphatic: No lymphadenopathy  Cardiovascular: Normal S1 S2  Respiratory: Lungs clear to auscultation	  Psychiatry: A & O x 3, Mood & affect appropriate  Gastrointestinal:  Soft, Non-tender, + BS	  Skin: No rashes, No ecchymoses, No cyanosis  Neurologic: Non-focal  Extremities: Normal range of motion, No clubbing, cyanosis or edema  Vascular: Peripheral pulses palpable 1+ bilaterally    TELEMETRY: 	    ECG:  	  RADIOLOGY:   DIAGNOSTIC TESTING:  [ ] Echocardiogram:  [ ]  Catheterization:  [ ] Stress Test:    OTHER: 	    LABS:	 	    CARDIAC MARKERS:                                  10.1   10.05 )-----------( 218      ( 05 Jan 2024 07:23 )             29.3     01-05    136  |  109<H>  |  30<H>  ----------------------------<  361<H>  4.0   |  17<L>  |  0.71    Ca    8.0<L>      05 Jan 2024 05:30  Mg     1.8     01-05    TPro  5.6<L>  /  Alb  2.3<L>  /  TBili  0.2  /  DBili  x   /  AST  11  /  ALT  8<L>  /  AlkPhos  72  01-05    proBNP:   Lipid Profile:   HgA1c:   TSH: Thyroid Stimulating Hormone, Serum: 2.080 uIU/mL (01-05 @ 05:30)      ASSESSMENT/PLAN:

## 2024-01-05 NOTE — PROGRESS NOTE ADULT - ASSESSMENT
87F, Ukrainian speaking, [place], w/ PMHx of HTN, HLD, CAD s/p multiple PCIs (most recently 7/2023 w/ MARTINA pOM1 and PTCA mLAD ISR), HFmEF (LVEF 49%), DM-II, Hypothyroidism, PAD s/p ? PTA, Glaucoma c/b L eye blindness, h/o urinary retention c/b UTIs, balance d/o, and recent hospitalization at [facility] for COVID and CP (found to have new WMA in basal inferior wall), was referred to [facility] ED by outpt cardiologist for intermittent substernal CP at rest, pt now admitted to cardiac tele for further management of unstable angina and is NPO for [facility] today 1/5/23 with Dr. Rosales. 87F, Occitan speaking, [place], w/ PMHx of HTN, HLD, CAD s/p multiple PCIs (most recently 7/2023 w/ MARTINA pOM1 and PTCA mLAD ISR), HFmEF (LVEF 49%), DM-II, Hypothyroidism, PAD s/p ? PTA, Glaucoma c/b L eye blindness, h/o urinary retention c/b UTIs, balance d/o, and recent hospitalization at [facility] for COVID and CP (found to have new WMA in basal inferior wall), was referred to [facility] ED by outpt cardiologist for intermittent substernal CP at rest, pt now admitted to cardiac tele for further management of unstable angina and is NPO for [facility] today 1/5/23 with Dr. Rosales. 88 F, Burkinan speaking, [place], w/ PMHx of HTN, HLD, CAD s/p multiple PCIs (most recently 7/2023 w/ MARTINA pOM1 and PTCA mLAD ISR), HFmEF (LVEF 49%), DM-II, Hypothyroidism, PAD s/p ? PTA, Glaucoma c/b L eye blindness, h/o urinary retention c/b UTIs, balance d/o, and recent hospitalization at [facility] for COVID and CP (found to have new WMA in basal inferior wall), was referred to [facility] ED by outpt cardiologist for intermittent substernal CP at rest, pt now admitted to cardiac tele for further management of unstable angina and is NPO for [facility] today 1/5/23 with Dr. Rosales. 88 F, Icelandic speaking, [place], w/ PMHx of HTN, HLD, CAD s/p multiple PCIs (most recently 7/2023 w/ MARTINA pOM1 and PTCA mLAD ISR), HFmEF (LVEF 49%), DM-II, Hypothyroidism, PAD s/p ? PTA, Glaucoma c/b L eye blindness, h/o urinary retention c/b UTIs, balance d/o, and recent hospitalization at [facility] for COVID and CP (found to have new WMA in basal inferior wall), was referred to [facility] ED by outpt cardiologist for intermittent substernal CP at rest, pt now admitted to cardiac tele for further management of unstable angina and is NPO for [facility] today 1/5/23 with Dr. Rosales. 88 F, Cuban speaking, [place], w/ PMHx of HTN, HLD, CAD s/p multiple PCIs (most recently 7/2023 w/ MARTINA pOM1 and PTCA mLAD ISR), HFmEF (LVEF 49%), DM-II, Hypothyroidism, PAD s/p ? PTA, Glaucoma c/b L eye blindness, h/o urinary retention c/b UTIs, balance d/o, and recent hospitalization at [facility] for COVID and CP (found to have new WMA in basal inferior wall), was referred to [facility] ED by outpt cardiologist for intermittent substernal CP at rest, pt now admitted to cardiac tele for further management of unstable angina and is NPO for [facility] today 1/5/24 with Dr. Rosales. 88 F, Czech speaking, [place], w/ PMHx of HTN, HLD, CAD s/p multiple PCIs (most recently 7/2023 w/ MARTINA pOM1 and PTCA mLAD ISR), HFmEF (LVEF 49%), DM-II, Hypothyroidism, PAD s/p ? PTA, Glaucoma c/b L eye blindness, h/o urinary retention c/b UTIs, balance d/o, and recent hospitalization at [facility] for COVID and CP (found to have new WMA in basal inferior wall), was referred to [facility] ED by outpt cardiologist for intermittent substernal CP at rest, pt now admitted to cardiac tele for further management of unstable angina and is NPO for [facility] today 1/5/24 with Dr. Rosales.

## 2024-01-05 NOTE — CONSULT NOTE ADULT - ATTENDING COMMENTS
THis patient with unstable angina is to have cardiac catheterization. She had recent Covid-19 treated at [facility]. She has hypothyroidism on 50 mcg Levothyroxine. Glucose level was 394 last night and today 307-152. I agree with treating with 8 units Lantus Insulin plus 3 units pre-meal lispro Insulin. THis patient with unstable angina is to have cardiac catheterization. She had recent Covid-19 treated at [facility]. She has hypothyroidism on 50 mcg Levothyroxine. Glucose level was 394 last night and today 307-152. I agree with treating with 8 units Lantus Insulin plus 3 units pre-meal lispro Insulin.

## 2024-01-05 NOTE — PROGRESS NOTE ADULT - PROBLEM SELECTOR PLAN 4
euvolemic, warm and HD stable  - f/u TTE   - GDMT - Continue Toprol 25mg QD, Imdur 60mg QD and Farxiga 10mg QD. ACE/ARB was d/c by outpt for hypotension  - start losartan 25mg daily for BP control   - Core measure, daily weight and strict I&Os

## 2024-01-05 NOTE — PROGRESS NOTE ADULT - PROBLEM SELECTOR PLAN 2
SBP 160s  - Continue Toprol 25mg QD, and Imdur 60mg QD SBP 160s  - Continue Toprol 25mg QD, and Imdur 60mg QD  - START losartan 25mg daily

## 2024-01-05 NOTE — PROGRESS NOTE ADULT - PROBLEM SELECTOR PLAN 6
- LDL 25 Total Cholesterol 91 HDL 51  - Continue Lipitor 40mg HS    F: None  E: Replete if K<4 or Mag<2  N: DASH Diet  GIppx: pantoprazole  VTEppx: none due to pending [facility]  Dispo: pending clinical course - LDL 25 Total Cholesterol 91 HDL 51  - Continue Lipitor 40mg HS    F: None  E: Replete if K<4 or Mag<2  N: DASH Diet  GIppx: pantoprazole  VTEppx: none due to pending [facility]  Dispo: pending clinical course - LDL 25 Total Cholesterol 91 HDL 51  - Continue Lipitor 40mg HS    F: None  E: Replete if K<4 or Mag<2  N: NPO  GIppx: pantoprazole  VTEppx: none due to pending [facility]  Dispo: pending clinical course - LDL 25 Total Cholesterol 91 HDL 51  - Continue Lipitor 40mg HS    F: None  E: Replete if K<4 or Mag<2  N: NPO  GIppx: pantoprazole  VTEppx: none due to pending [facility]  Dispo: pending clinical course

## 2024-01-06 ENCOUNTER — TRANSCRIPTION ENCOUNTER (OUTPATIENT)
Age: 89
End: 2024-01-06

## 2024-01-06 VITALS
DIASTOLIC BLOOD PRESSURE: 69 MMHG | RESPIRATION RATE: 17 BRPM | HEART RATE: 65 BPM | OXYGEN SATURATION: 99 % | SYSTOLIC BLOOD PRESSURE: 157 MMHG | TEMPERATURE: 98 F

## 2024-01-06 LAB
ANION GAP SERPL CALC-SCNC: 7 MMOL/L — SIGNIFICANT CHANGE UP (ref 5–17)
ANION GAP SERPL CALC-SCNC: 7 MMOL/L — SIGNIFICANT CHANGE UP (ref 5–17)
BUN SERPL-MCNC: 25 MG/DL — HIGH (ref 7–23)
BUN SERPL-MCNC: 25 MG/DL — HIGH (ref 7–23)
CALCIUM SERPL-MCNC: 8.5 MG/DL — SIGNIFICANT CHANGE UP (ref 8.4–10.5)
CALCIUM SERPL-MCNC: 8.5 MG/DL — SIGNIFICANT CHANGE UP (ref 8.4–10.5)
CHLORIDE SERPL-SCNC: 109 MMOL/L — HIGH (ref 96–108)
CHLORIDE SERPL-SCNC: 109 MMOL/L — HIGH (ref 96–108)
CO2 SERPL-SCNC: 21 MMOL/L — LOW (ref 22–31)
CO2 SERPL-SCNC: 21 MMOL/L — LOW (ref 22–31)
CREAT SERPL-MCNC: 0.79 MG/DL — SIGNIFICANT CHANGE UP (ref 0.5–1.3)
CREAT SERPL-MCNC: 0.79 MG/DL — SIGNIFICANT CHANGE UP (ref 0.5–1.3)
EGFR: 72 ML/MIN/1.73M2 — SIGNIFICANT CHANGE UP
EGFR: 72 ML/MIN/1.73M2 — SIGNIFICANT CHANGE UP
GLUCOSE BLDC GLUCOMTR-MCNC: 161 MG/DL — HIGH (ref 70–99)
GLUCOSE BLDC GLUCOMTR-MCNC: 161 MG/DL — HIGH (ref 70–99)
GLUCOSE BLDC GLUCOMTR-MCNC: 209 MG/DL — HIGH (ref 70–99)
GLUCOSE BLDC GLUCOMTR-MCNC: 209 MG/DL — HIGH (ref 70–99)
GLUCOSE BLDC GLUCOMTR-MCNC: 324 MG/DL — HIGH (ref 70–99)
GLUCOSE BLDC GLUCOMTR-MCNC: 324 MG/DL — HIGH (ref 70–99)
GLUCOSE SERPL-MCNC: 207 MG/DL — HIGH (ref 70–99)
GLUCOSE SERPL-MCNC: 207 MG/DL — HIGH (ref 70–99)
HCT VFR BLD CALC: 28.2 % — LOW (ref 34.5–45)
HCT VFR BLD CALC: 28.2 % — LOW (ref 34.5–45)
HGB BLD-MCNC: 9.5 G/DL — LOW (ref 11.5–15.5)
HGB BLD-MCNC: 9.5 G/DL — LOW (ref 11.5–15.5)
MAGNESIUM SERPL-MCNC: 2 MG/DL — SIGNIFICANT CHANGE UP (ref 1.6–2.6)
MAGNESIUM SERPL-MCNC: 2 MG/DL — SIGNIFICANT CHANGE UP (ref 1.6–2.6)
MCHC RBC-ENTMCNC: 31.7 PG — SIGNIFICANT CHANGE UP (ref 27–34)
MCHC RBC-ENTMCNC: 31.7 PG — SIGNIFICANT CHANGE UP (ref 27–34)
MCHC RBC-ENTMCNC: 33.7 GM/DL — SIGNIFICANT CHANGE UP (ref 32–36)
MCHC RBC-ENTMCNC: 33.7 GM/DL — SIGNIFICANT CHANGE UP (ref 32–36)
MCV RBC AUTO: 94 FL — SIGNIFICANT CHANGE UP (ref 80–100)
MCV RBC AUTO: 94 FL — SIGNIFICANT CHANGE UP (ref 80–100)
NRBC # BLD: 0 /100 WBCS — SIGNIFICANT CHANGE UP (ref 0–0)
NRBC # BLD: 0 /100 WBCS — SIGNIFICANT CHANGE UP (ref 0–0)
PLATELET # BLD AUTO: 204 K/UL — SIGNIFICANT CHANGE UP (ref 150–400)
PLATELET # BLD AUTO: 204 K/UL — SIGNIFICANT CHANGE UP (ref 150–400)
POTASSIUM SERPL-MCNC: 4.2 MMOL/L — SIGNIFICANT CHANGE UP (ref 3.5–5.3)
POTASSIUM SERPL-MCNC: 4.2 MMOL/L — SIGNIFICANT CHANGE UP (ref 3.5–5.3)
POTASSIUM SERPL-SCNC: 4.2 MMOL/L — SIGNIFICANT CHANGE UP (ref 3.5–5.3)
POTASSIUM SERPL-SCNC: 4.2 MMOL/L — SIGNIFICANT CHANGE UP (ref 3.5–5.3)
RBC # BLD: 3 M/UL — LOW (ref 3.8–5.2)
RBC # BLD: 3 M/UL — LOW (ref 3.8–5.2)
RBC # FLD: 15.3 % — HIGH (ref 10.3–14.5)
RBC # FLD: 15.3 % — HIGH (ref 10.3–14.5)
SODIUM SERPL-SCNC: 137 MMOL/L — SIGNIFICANT CHANGE UP (ref 135–145)
SODIUM SERPL-SCNC: 137 MMOL/L — SIGNIFICANT CHANGE UP (ref 135–145)
WBC # BLD: 9.91 K/UL — SIGNIFICANT CHANGE UP (ref 3.8–10.5)
WBC # BLD: 9.91 K/UL — SIGNIFICANT CHANGE UP (ref 3.8–10.5)
WBC # FLD AUTO: 9.91 K/UL — SIGNIFICANT CHANGE UP (ref 3.8–10.5)
WBC # FLD AUTO: 9.91 K/UL — SIGNIFICANT CHANGE UP (ref 3.8–10.5)

## 2024-01-06 PROCEDURE — 71045 X-RAY EXAM CHEST 1 VIEW: CPT | Mod: 26

## 2024-01-06 PROCEDURE — 99239 HOSP IP/OBS DSCHRG MGMT >30: CPT

## 2024-01-06 RX ORDER — HYDRALAZINE HCL 50 MG
5 TABLET ORAL ONCE
Refills: 0 | Status: COMPLETED | OUTPATIENT
Start: 2024-01-06 | End: 2024-01-06

## 2024-01-06 RX ORDER — LOSARTAN POTASSIUM 100 MG/1
25 TABLET, FILM COATED ORAL ONCE
Refills: 0 | Status: COMPLETED | OUTPATIENT
Start: 2024-01-06 | End: 2024-01-06

## 2024-01-06 RX ORDER — LOSARTAN POTASSIUM 100 MG/1
1 TABLET, FILM COATED ORAL
Qty: 30 | Refills: 0
Start: 2024-01-06 | End: 2024-02-04

## 2024-01-06 RX ADMIN — Medication 3 UNIT(S): at 08:23

## 2024-01-06 RX ADMIN — Medication 4: at 12:37

## 2024-01-06 RX ADMIN — LOSARTAN POTASSIUM 25 MILLIGRAM(S): 100 TABLET, FILM COATED ORAL at 10:52

## 2024-01-06 RX ADMIN — PANTOPRAZOLE SODIUM 40 MILLIGRAM(S): 20 TABLET, DELAYED RELEASE ORAL at 06:18

## 2024-01-06 RX ADMIN — CLOPIDOGREL BISULFATE 75 MILLIGRAM(S): 75 TABLET, FILM COATED ORAL at 12:38

## 2024-01-06 RX ADMIN — Medication 2: at 08:23

## 2024-01-06 RX ADMIN — Medication 25 MILLIGRAM(S): at 06:18

## 2024-01-06 RX ADMIN — Medication 81 MILLIGRAM(S): at 12:38

## 2024-01-06 RX ADMIN — ISOSORBIDE MONONITRATE 60 MILLIGRAM(S): 60 TABLET, EXTENDED RELEASE ORAL at 12:38

## 2024-01-06 RX ADMIN — LOSARTAN POTASSIUM 25 MILLIGRAM(S): 100 TABLET, FILM COATED ORAL at 06:18

## 2024-01-06 RX ADMIN — DAPAGLIFLOZIN 10 MILLIGRAM(S): 10 TABLET, FILM COATED ORAL at 12:38

## 2024-01-06 RX ADMIN — Medication 50 MICROGRAM(S): at 05:38

## 2024-01-06 RX ADMIN — Medication 3 UNIT(S): at 12:37

## 2024-01-06 NOTE — DISCHARGE NOTE PROVIDER - HOSPITAL COURSE
87F, Yakut speaking, [place], w/ PMHx of HTN, HLD, CAD s/p multiple PCIs (most recently 7/2023 w/ MARTINA pOM1 and PTCA mLAD ISR), HFmEF (LVEF 49%), DM-II, Hypothyroidism, PAD s/p ? PTA, Glaucoma c/b L eye blindness, h/o urinary retention c/b UTIs, balance d/o, and recent hospitalization at [facility] for COVID and CP (found to have new WMA in basal inferior wall), was referred to [facility] ED by outpt cardiologist for intermittent substernal CP at rest for several days. Pt endorses 5-6 episodes of CP per day, lasting 15 minutes and improves w/ ASA and Imdur. She also reports associated SOB and worsening BARRON when ambulating around the house. Pt denies any palpitations, dizziness, syncope, diaphoresis, fatigue, LE edema, BARRON, orthopnea, PND, N/V/D, abd pain, cough, congestion, fever, chills or recent sick contact.     In ED, /66, HR 79bpm, T 98.4F, RR 20, SpO2 97% RA  Labs - hsTrop T 20, , CKMB 3.2 and BUN 33  EKG - NSR, non ischemic    Pt now admitted to cardiac telemetry for further management of unstable angina. Pt underwent diagnostic LHC with Dr. Rosales 1/5/24 that revealed LM MLI; LAD patent stent w/ mild diffuse dz; LCx mild diffuse; mRCA 20-30% w/ mild diffuse dz. EDP 12. RFA Vascade. Pt was hypertensive to the 170s post cath and pt was given imdur 60mg and hydralazine 10mg x1 with improvement. Post cath fluids were initially held and were resumed when SBP improved to the 130s. Pt was also started on losartan 25mg daily for blood pressure control and better diabetes management.    Pt A1c was found to be 10.1. Endocrine team consulted, recommended keeping lantus 8 units at bedtime and lispro 3 units before each meal.     Pt got a TTE 1/05/24 that revealed hypokinetic basal inferior segment, other walls contract normally with EF=50-55% with mild degenerative mitral stenosis, no other valve disease  and pulmonary hypertension present.     Pt seen and examined at bedside this AM without any complaints or events overnight, VSS, labs and telemetry reviewed and pt stable for discharge as discussed with Dr. Edwards. Pt has received appropriate discharge instructions, including medication regimen, access site management and follow up with Dr. Miller in 1-2 weeks.    Discharge medications:      87F, Turkmen speaking, [place], w/ PMHx of HTN, HLD, CAD s/p multiple PCIs (most recently 7/2023 w/ MARTINA pOM1 and PTCA mLAD ISR), HFmEF (LVEF 49%), DM-II, Hypothyroidism, PAD s/p ? PTA, Glaucoma c/b L eye blindness, h/o urinary retention c/b UTIs, balance d/o, and recent hospitalization at [facility] for COVID and CP (found to have new WMA in basal inferior wall), was referred to [facility] ED by outpt cardiologist for intermittent substernal CP at rest for several days. Pt endorses 5-6 episodes of CP per day, lasting 15 minutes and improves w/ ASA and Imdur. She also reports associated SOB and worsening BARRON when ambulating around the house. Pt denies any palpitations, dizziness, syncope, diaphoresis, fatigue, LE edema, BARRON, orthopnea, PND, N/V/D, abd pain, cough, congestion, fever, chills or recent sick contact.     In ED, /66, HR 79bpm, T 98.4F, RR 20, SpO2 97% RA  Labs - hsTrop T 20, , CKMB 3.2 and BUN 33  EKG - NSR, non ischemic    Pt now admitted to cardiac telemetry for further management of unstable angina. Pt underwent diagnostic LHC with Dr. Rosales 1/5/24 that revealed LM MLI; LAD patent stent w/ mild diffuse dz; LCx mild diffuse; mRCA 20-30% w/ mild diffuse dz. EDP 12. RFA Vascade. Pt was hypertensive to the 170s post cath and pt was given imdur 60mg and hydralazine 10mg x1 with improvement. Post cath fluids were initially held and were resumed when SBP improved to the 130s. Pt was also started on losartan 25mg daily for blood pressure control and better diabetes management.    Pt A1c was found to be 10.1. Endocrine team consulted, recommended keeping lantus 8 units at bedtime and lispro 3 units before each meal.     Pt got a TTE 1/05/24 that revealed hypokinetic basal inferior segment, other walls contract normally with EF=50-55% with mild degenerative mitral stenosis, no other valve disease  and pulmonary hypertension present.     Pt seen and examined at bedside this AM without any complaints or events overnight, VSS, labs and telemetry reviewed and pt stable for discharge as discussed with Dr. Edwards. Pt has received appropriate discharge instructions, including medication regimen, access site management and follow up with Dr. Miller in 1-2 weeks.    Discharge medications:      87F, Azeri speaking, [place], w/ PMHx of HTN, HLD, CAD s/p multiple PCIs (most recently 7/2023 w/ MARTINA pOM1 and PTCA mLAD ISR), HFmEF (LVEF 49%), DM-II, Hypothyroidism, PAD s/p ? PTA, Glaucoma c/b L eye blindness, h/o urinary retention c/b UTIs, balance d/o, and recent hospitalization at [facility] for COVID and CP (found to have new WMA in basal inferior wall), was referred to [facility] ED by outpt cardiologist for intermittent substernal CP at rest for several days. Pt endorses 5-6 episodes of CP per day, lasting 15 minutes and improves w/ ASA and Imdur. She also reports associated SOB and worsening BARRON when ambulating around the house. Pt denies any palpitations, dizziness, syncope, diaphoresis, fatigue, LE edema, BARRON, orthopnea, PND, N/V/D, abd pain, cough, congestion, fever, chills or recent sick contact.     In ED, /66, HR 79bpm, T 98.4F, RR 20, SpO2 97% RA  Labs - hsTrop T 20, , CKMB 3.2 and BUN 33  EKG - NSR, non ischemic    Pt now admitted to cardiac telemetry for further management of unstable angina. Pt underwent diagnostic LHC with Dr. Rosales 1/5/24 that revealed LM MLI; LAD patent stent w/ mild diffuse dz; LCx mild diffuse; mRCA 20-30% w/ mild diffuse dz. EDP 12. RFA Vascade. Pt was hypertensive to the 170s post cath and pt was given imdur 60mg and hydralazine 10mg x1 with improvement. Post cath fluids were initially held and were resumed when SBP improved to the 130s. Pt was also started on losartan 50mg daily for blood pressure control and better diabetes management.    Pt A1c was found to be 10.1. Endocrine team consulted, recommended keeping lantus 8 units at bedtime and lispro 3 units before each meal. Pt will obtain outpatient follow up with endocrinologist on discharge within 1 month.     Pt got a TTE 1/05/24 that revealed hypokinetic basal inferior segment, other walls contract normally with EF=50-55% with mild degenerative mitral stenosis, no other valve disease  and pulmonary hypertension present.     Pt was complaining of shortness of breath on day of discharge however was satting 98% on room air. CXR was done on 1/6/24 that revealed Stable cardiomegaly, thoracic aortic calcification. Bilateral interstitial lung disease, unchanged. Right basilar focal atelectasis.     Pt seen and examined at bedside this AM without any complaints or events overnight, VSS, labs and telemetry reviewed and pt stable for discharge as discussed with Dr. Edwards. Pt has received appropriate discharge instructions, including medication regimen, access site management and follow up with Dr. Miller in 1-2 weeks.    Discharge medications:      87F, Irish speaking, [place], w/ PMHx of HTN, HLD, CAD s/p multiple PCIs (most recently 7/2023 w/ MARTINA pOM1 and PTCA mLAD ISR), HFmEF (LVEF 49%), DM-II, Hypothyroidism, PAD s/p ? PTA, Glaucoma c/b L eye blindness, h/o urinary retention c/b UTIs, balance d/o, and recent hospitalization at [facility] for COVID and CP (found to have new WMA in basal inferior wall), was referred to [facility] ED by outpt cardiologist for intermittent substernal CP at rest for several days. Pt endorses 5-6 episodes of CP per day, lasting 15 minutes and improves w/ ASA and Imdur. She also reports associated SOB and worsening BARRON when ambulating around the house. Pt denies any palpitations, dizziness, syncope, diaphoresis, fatigue, LE edema, BARRON, orthopnea, PND, N/V/D, abd pain, cough, congestion, fever, chills or recent sick contact.     In ED, /66, HR 79bpm, T 98.4F, RR 20, SpO2 97% RA  Labs - hsTrop T 20, , CKMB 3.2 and BUN 33  EKG - NSR, non ischemic    Pt now admitted to cardiac telemetry for further management of unstable angina. Pt underwent diagnostic LHC with Dr. Rosales 1/5/24 that revealed LM MLI; LAD patent stent w/ mild diffuse dz; LCx mild diffuse; mRCA 20-30% w/ mild diffuse dz. EDP 12. RFA Vascade. Pt was hypertensive to the 170s post cath and pt was given imdur 60mg and hydralazine 10mg x1 with improvement. Post cath fluids were initially held and were resumed when SBP improved to the 130s. Pt was also started on losartan 50mg daily for blood pressure control and better diabetes management.    Pt A1c was found to be 10.1. Endocrine team consulted, recommended keeping lantus 8 units at bedtime and lispro 3 units before each meal. Pt will obtain outpatient follow up with endocrinologist on discharge within 1 month.     Pt got a TTE 1/05/24 that revealed hypokinetic basal inferior segment, other walls contract normally with EF=50-55% with mild degenerative mitral stenosis, no other valve disease  and pulmonary hypertension present.     Pt was complaining of shortness of breath on day of discharge however was satting 98% on room air. CXR was done on 1/6/24 that revealed Stable cardiomegaly, thoracic aortic calcification. Bilateral interstitial lung disease, unchanged. Right basilar focal atelectasis.     Pt seen and examined at bedside this AM without any complaints or events overnight, VSS, labs and telemetry reviewed and pt stable for discharge as discussed with Dr. Edwards. Pt has received appropriate discharge instructions, including medication regimen, access site management and follow up with Dr. Miller in 1-2 weeks.    Discharge medications:      87F, Danish speaking, [place], w/ PMHx of HTN, HLD, CAD s/p multiple PCIs (most recently 7/2023 w/ MARTINA pOM1 and PTCA mLAD ISR), HFmEF (LVEF 49%), DM-II, Hypothyroidism, PAD s/p ? PTA, Glaucoma c/b L eye blindness, h/o urinary retention c/b UTIs, balance d/o, and recent hospitalization at [facility] for COVID and CP (found to have new WMA in basal inferior wall), was referred to [facility] ED by outpt cardiologist for intermittent substernal CP at rest for several days. Pt endorses 5-6 episodes of CP per day, lasting 15 minutes and improves w/ ASA and Imdur. She also reports associated SOB and worsening BARRON when ambulating around the house. Pt denies any palpitations, dizziness, syncope, diaphoresis, fatigue, LE edema, BARRON, orthopnea, PND, N/V/D, abd pain, cough, congestion, fever, chills or recent sick contact.     In ED, /66, HR 79bpm, T 98.4F, RR 20, SpO2 97% RA  Labs - hsTrop T 20, , CKMB 3.2 and BUN 33  EKG - NSR, non ischemic    Pt now admitted to cardiac telemetry for further management of unstable angina. Pt underwent diagnostic LHC with Dr. Rosales 1/5/24 that revealed LM MLI; LAD patent stent w/ mild diffuse dz; LCx mild diffuse; mRCA 20-30% w/ mild diffuse dz. EDP 12. RFA Vascade. Pt was hypertensive to the 170s post cath and pt was given imdur 60mg and  PO hydralazine 10mg x1 with improvement. Post cath fluids were initially held and were resumed when SBP improved to the 130s. Pt was also started on losartan 50mg daily for blood pressure control and better diabetes management.    Pt A1c was found to be 10.1. Endocrine team consulted, recommended keeping lantus 8 units at bedtime and lispro 3 units before each meal. Pt will obtain outpatient follow up with endocrinologist on discharge within 1 month.     Pt got a TTE 1/05/24 that revealed hypokinetic basal inferior segment, other walls contract normally with EF=50-55% with mild degenerative mitral stenosis, no other valve disease. pulmonary hypertension present.     Pt was complaining of shortness of breath on day of discharge however was satting 98% on room air. CXR was done on 1/6/24 that revealed Stable cardiomegaly, thoracic aortic calcification. Bilateral interstitial lung disease, unchanged. Right basilar focal atelectasis.     Pt examined and  stable for discharge as discussed with Dr. Edwards. Pt has received appropriate discharge instructions, including medication regimen, access site management and follow up with Dr. Miller in 1-2 weeks.    Discharge medications: Aspirin 81mg daily, Plavix 75mg daily, atorvastatin 40mg daily, zetia 10mg daily, losartan 50mg daily, Toprol 25mg daily, imdur 60mg daily, farxiga 10mg daily, lansoprazole 30mg daily, levothyroxine 50mcg daily, Novolog 3-5 units 3 times per day with meals, Basaglar Kwik pen 8 units at bedtime     87F, Bulgarian speaking, [place], w/ PMHx of HTN, HLD, CAD s/p multiple PCIs (most recently 7/2023 w/ MARTINA pOM1 and PTCA mLAD ISR), HFmEF (LVEF 49%), DM-II, Hypothyroidism, PAD s/p ? PTA, Glaucoma c/b L eye blindness, h/o urinary retention c/b UTIs, balance d/o, and recent hospitalization at [facility] for COVID and CP (found to have new WMA in basal inferior wall), was referred to [facility] ED by outpt cardiologist for intermittent substernal CP at rest for several days. Pt endorses 5-6 episodes of CP per day, lasting 15 minutes and improves w/ ASA and Imdur. She also reports associated SOB and worsening BARRON when ambulating around the house. Pt denies any palpitations, dizziness, syncope, diaphoresis, fatigue, LE edema, BARRON, orthopnea, PND, N/V/D, abd pain, cough, congestion, fever, chills or recent sick contact.     In ED, /66, HR 79bpm, T 98.4F, RR 20, SpO2 97% RA  Labs - hsTrop T 20, , CKMB 3.2 and BUN 33  EKG - NSR, non ischemic    Pt now admitted to cardiac telemetry for further management of unstable angina. Pt underwent diagnostic LHC with Dr. Rosales 1/5/24 that revealed LM MLI; LAD patent stent w/ mild diffuse dz; LCx mild diffuse; mRCA 20-30% w/ mild diffuse dz. EDP 12. RFA Vascade. Pt was hypertensive to the 170s post cath and pt was given imdur 60mg and  PO hydralazine 10mg x1 with improvement. Post cath fluids were initially held and were resumed when SBP improved to the 130s. Pt was also started on losartan 50mg daily for blood pressure control and better diabetes management.    Pt A1c was found to be 10.1. Endocrine team consulted, recommended keeping lantus 8 units at bedtime and lispro 3 units before each meal. Pt will obtain outpatient follow up with endocrinologist on discharge within 1 month.     Pt got a TTE 1/05/24 that revealed hypokinetic basal inferior segment, other walls contract normally with EF=50-55% with mild degenerative mitral stenosis, no other valve disease. pulmonary hypertension present.     Pt was complaining of shortness of breath on day of discharge however was satting 98% on room air. CXR was done on 1/6/24 that revealed Stable cardiomegaly, thoracic aortic calcification. Bilateral interstitial lung disease, unchanged. Right basilar focal atelectasis.     Pt examined and  stable for discharge as discussed with Dr. Edwards. Pt has received appropriate discharge instructions, including medication regimen, access site management and follow up with Dr. Miller in 1-2 weeks.    Discharge medications: Aspirin 81mg daily, Plavix 75mg daily, atorvastatin 40mg daily, zetia 10mg daily, losartan 50mg daily, Toprol 25mg daily, imdur 60mg daily, farxiga 10mg daily, lansoprazole 30mg daily, levothyroxine 50mcg daily, Novolog 3-5 units 3 times per day with meals, Basaglar Kwik pen 8 units at bedtime     87F, Slovak speaking, [place], w/ PMHx of HTN, HLD, CAD s/p multiple PCIs (most recently 7/2023 w/ MARTINA pOM1 and PTCA mLAD ISR), HFmEF (LVEF 49%), DM-II, Hypothyroidism, PAD s/p ? PTA, Glaucoma c/b L eye blindness, h/o urinary retention c/b UTIs, balance d/o, and recent hospitalization at [facility] for COVID and CP (found to have new WMA in basal inferior wall), was referred to [facility] ED by outpt cardiologist for intermittent substernal CP at rest for several days. Pt endorses 5-6 episodes of CP per day, lasting 15 minutes and improves w/ ASA and Imdur. She also reports associated SOB and worsening BARRON when ambulating around the house. Pt denies any palpitations, dizziness, syncope, diaphoresis, fatigue, LE edema, BARRON, orthopnea, PND, N/V/D, abd pain, cough, congestion, fever, chills or recent sick contact.     In ED, /66, HR 79bpm, T 98.4F, RR 20, SpO2 97% RA  Labs - hsTrop T 20, , CKMB 3.2 and BUN 33  EKG - NSR, non ischemic    Pt now admitted to cardiac telemetry for further management of unstable angina. Pt underwent diagnostic LHC with Dr. Rosales 1/5/24 that revealed LM MLI; LAD patent stent w/ mild diffuse dz; LCx mild diffuse; mRCA 20-30% w/ mild diffuse dz. EDP 12. RFA Vascade. Pt was hypertensive to the 170s post cath and pt was given imdur 60mg and  PO hydralazine 10mg x1 with improvement. Post cath fluids were initially held and were resumed when SBP improved to the 130s. Pt was also started on losartan 50mg daily for blood pressure control and better diabetes management.    Pt A1c was found to be 10.1. Endocrine team consulted, recommended keeping lantus 8 units at bedtime and lispro 3 units before each meal. Pt will obtain outpatient follow up with endocrinologist on discharge within 1 month.     Pt got a TTE 1/05/24 that revealed hypokinetic basal inferior segment, other walls contract normally with EF=50-55% with mild degenerative mitral stenosis, no other valve disease. pulmonary hypertension present.     Pt was complaining of shortness of breath on day of discharge however was satting 98% on room air. CXR was done on 1/6/24 that revealed Stable cardiomegaly, thoracic aortic calcification. Bilateral interstitial lung disease, unchanged. Right basilar focal atelectasis.     Pt examined and  stable for discharge as discussed with Dr. Edwards. Pt has received appropriate discharge instructions, including medication regimen, access site management and follow up with Dr. Miller in 1-2 weeks.    Discharge medications: Aspirin 81mg daily, Plavix 75mg daily, atorvastatin 40mg daily, zetia 10mg daily, losartan 50mg daily, Toprol 25mg daily, imdur 60mg daily, farxiga 10mg daily, lansoprazole 30mg daily, levothyroxine 50mcg daily, Novolog 3-5 units 3 times per day with meals, Basaglar Kwik pen 8 units at bedtime    Attending addendum  88 yo lady PMHx of DM2, CAD s/p PCI, HF mildly reduced EF, PAD s/p prior procedures, and recent admission to the OSH Dec/2023 COVID-19 infection who was admitted for cardiac chest pain concerning for unstable angina.    Assessment  1. Hx of CAD s/p PCI with new cardiac chest pain concerning for unstable angina.  HsTrop negative  [facility] 01/05/24 with mild LM disease, patent LAD stent, mild LCx disease, mild mRCA disease, LVEDP 12  2. HF mildly reduced EF euvolemic on exam  TTE   1. Basal inferior segment is hypokinetic.   2. Other walls contract normally.   3. Borderline reduced left ventricular systolic function.  3. PAD s/p prior procedures  4. DM2    Plan  1. DAPT w/ ASA 81mg QD and Plavix 75mg QD  2. C/w losartan 50mg PO QD, metoprolol succinate 25mg PO QD, Imdur 60mg QD, and Farxiga 10mg QD  3. High intensity statin  4. DC today 87F, Setswana speaking, [place], w/ PMHx of HTN, HLD, CAD s/p multiple PCIs (most recently 7/2023 w/ MARTINA pOM1 and PTCA mLAD ISR), HFmEF (LVEF 49%), DM-II, Hypothyroidism, PAD s/p ? PTA, Glaucoma c/b L eye blindness, h/o urinary retention c/b UTIs, balance d/o, and recent hospitalization at [facility] for COVID and CP (found to have new WMA in basal inferior wall), was referred to [facility] ED by outpt cardiologist for intermittent substernal CP at rest for several days. Pt endorses 5-6 episodes of CP per day, lasting 15 minutes and improves w/ ASA and Imdur. She also reports associated SOB and worsening BARRON when ambulating around the house. Pt denies any palpitations, dizziness, syncope, diaphoresis, fatigue, LE edema, BARRON, orthopnea, PND, N/V/D, abd pain, cough, congestion, fever, chills or recent sick contact.     In ED, /66, HR 79bpm, T 98.4F, RR 20, SpO2 97% RA  Labs - hsTrop T 20, , CKMB 3.2 and BUN 33  EKG - NSR, non ischemic    Pt now admitted to cardiac telemetry for further management of unstable angina. Pt underwent diagnostic LHC with Dr. Rosales 1/5/24 that revealed LM MLI; LAD patent stent w/ mild diffuse dz; LCx mild diffuse; mRCA 20-30% w/ mild diffuse dz. EDP 12. RFA Vascade. Pt was hypertensive to the 170s post cath and pt was given imdur 60mg and  PO hydralazine 10mg x1 with improvement. Post cath fluids were initially held and were resumed when SBP improved to the 130s. Pt was also started on losartan 50mg daily for blood pressure control and better diabetes management.    Pt A1c was found to be 10.1. Endocrine team consulted, recommended keeping lantus 8 units at bedtime and lispro 3 units before each meal. Pt will obtain outpatient follow up with endocrinologist on discharge within 1 month.     Pt got a TTE 1/05/24 that revealed hypokinetic basal inferior segment, other walls contract normally with EF=50-55% with mild degenerative mitral stenosis, no other valve disease. pulmonary hypertension present.     Pt was complaining of shortness of breath on day of discharge however was satting 98% on room air. CXR was done on 1/6/24 that revealed Stable cardiomegaly, thoracic aortic calcification. Bilateral interstitial lung disease, unchanged. Right basilar focal atelectasis.     Pt examined and  stable for discharge as discussed with Dr. Edwards. Pt has received appropriate discharge instructions, including medication regimen, access site management and follow up with Dr. Miller in 1-2 weeks.    Discharge medications: Aspirin 81mg daily, Plavix 75mg daily, atorvastatin 40mg daily, zetia 10mg daily, losartan 50mg daily, Toprol 25mg daily, imdur 60mg daily, farxiga 10mg daily, lansoprazole 30mg daily, levothyroxine 50mcg daily, Novolog 3-5 units 3 times per day with meals, Basaglar Kwik pen 8 units at bedtime    Attending addendum  86 yo lady PMHx of DM2, CAD s/p PCI, HF mildly reduced EF, PAD s/p prior procedures, and recent admission to the OSH Dec/2023 COVID-19 infection who was admitted for cardiac chest pain concerning for unstable angina.    Assessment  1. Hx of CAD s/p PCI with new cardiac chest pain concerning for unstable angina.  HsTrop negative  [facility] 01/05/24 with mild LM disease, patent LAD stent, mild LCx disease, mild mRCA disease, LVEDP 12  2. HF mildly reduced EF euvolemic on exam  TTE   1. Basal inferior segment is hypokinetic.   2. Other walls contract normally.   3. Borderline reduced left ventricular systolic function.  3. PAD s/p prior procedures  4. DM2    Plan  1. DAPT w/ ASA 81mg QD and Plavix 75mg QD  2. C/w losartan 50mg PO QD, metoprolol succinate 25mg PO QD, Imdur 60mg QD, and Farxiga 10mg QD  3. High intensity statin  4. DC today

## 2024-01-06 NOTE — DISCHARGE NOTE NURSING/CASE MANAGEMENT/SOCIAL WORK - NSDCPEFALRISK_GEN_ALL_CORE
For information on Fall & Injury Prevention, visit: [email or web address] OR  [email or web address] OR  https://www.cdc.gov/steadi/patient.html For information on Fall & Injury Prevention, visit: [email or web address] OR  [email or web address] OR  https://www.cdc.gov/steadi/patient.html

## 2024-01-06 NOTE — DISCHARGE NOTE NURSING/CASE MANAGEMENT/SOCIAL WORK - PATIENT PORTAL LINK FT
You can access the FollowMyHealth Patient Portal offered by [facility] by registering at the following website: [email or web address]. By joining [email or web address]’s FollowMyHealth portal, you will also be able to view your health information using other applications (apps) compatible with our system. You can access the FollowMyHealth Patient Portal offered by [facility] by registering at the following website: [email or web address]. By joining [employer]’s FollowMyHealth portal, you will also be able to view your health information using other applications (apps) compatible with our system.

## 2024-01-06 NOTE — DISCHARGE NOTE PROVIDER - NSDCCPCAREPLAN_GEN_ALL_CORE_FT
PRINCIPAL DISCHARGE DIAGNOSIS  Diagnosis: Unstable angina pectoris  Assessment and Plan of Treatment: You came to the hospital for evaluation of your chest pain, which has since then resolved. You had cardiac enzymes which were negative x 3, revealing no damage to the heart muscle, or a heart attack. You had an Echocardiogram (ultrasound of the heart) which was normal. You also had a CT of your heart which revealed no blockages in the arteries of your heart.  PLEASE CONTINUE TO WITH ASPIRIN 81MG and Plavix 75mg DAILY.  Please follow up with Dr. Miller  in 1-2 weeks. If you experience any worsening chest pain, palpitations, dizziness, or shortness of breath, please go to the nearest emergency room.         SECONDARY DISCHARGE DIAGNOSES  Diagnosis: Hypothyroidism  Assessment and Plan of Treatment:     Diagnosis: Heart failure with mid-range ejection fraction  Assessment and Plan of Treatment:     Diagnosis: DM (diabetes mellitus)  Assessment and Plan of Treatment:     Diagnosis: HLD (hyperlipidemia)  Assessment and Plan of Treatment:     Diagnosis: Hypothyroidism  Assessment and Plan of Treatment:     Diagnosis: HTN (hypertension)  Assessment and Plan of Treatment:      PRINCIPAL DISCHARGE DIAGNOSIS  Diagnosis: Unstable angina pectoris  Assessment and Plan of Treatment: You came to the hospital for evaluation of your chest pain, which has since then resolved. You had cardiac enzymes which were negative x 3, revealing no damage to the heart muscle, or a heart attack. You had an Echocardiogram (ultrasound of the heart) which was unchanged from mior.   You underwent a cardiac catheterization on 1/6/24 and recevied no stents.  PLEASE CONTINUE ASPIRIN 81MG DAILY AND PLAVIX 75MG DAILY. DO NOT STOP THESE MEDICATIONS FOR ANY REASON AS THEY ARE KEEPING YOUR STENT OPEN AND PREVENTING A HEART ATTACK.   Avoid strenuous activity or heavy lifting anything more than 5lbs for the next five days. Do not take a bath or swim for the next five days; you may shower. For any bleeding or hematoma formation (hardened blood collection under the skin) at the access site of your R wrist please hold pressure and go to the emergency room. Please follow up with Dr. Miller in 1-2 weeks. For recurrent chest pain, please call your doctor or go to the emergency room.         SECONDARY DISCHARGE DIAGNOSES  Diagnosis: Hypothyroidism  Assessment and Plan of Treatment: Please continue levothyroxine 50mcg daily for hypothyroidism.    Diagnosis: Heart failure with mid-range ejection fraction  Assessment and Plan of Treatment: Please continue [id number] daily for your heart failure.    Diagnosis: DM (diabetes mellitus)  Assessment and Plan of Treatment: Your Hemoglobin A1c is 10.1 and your goal A1c is less than 7.0%. This number measures your average blood sugar level over the last three months.  Please continue insulin novolung as listed for diabetes. Maintain a low carbohydrate, low sugar diet, exercise, monitor your fingerstick blood sugars regarly and follow up with your Endocrinologist/Primary Care Doctor.    Diagnosis: HLD (hyperlipidemia)  Assessment and Plan of Treatment:     Diagnosis: Hypothyroidism  Assessment and Plan of Treatment:     Diagnosis: HTN (hypertension)  Assessment and Plan of Treatment:      PRINCIPAL DISCHARGE DIAGNOSIS  Diagnosis: Unstable angina pectoris  Assessment and Plan of Treatment: You came to the hospital for evaluation of your chest pain, which has since then resolved. You had cardiac enzymes which were negative x 3, revealing no damage to the heart muscle, or a heart attack. You had an Echocardiogram (ultrasound of the heart) which was unchanged from mior.   You underwent a cardiac catheterization on 1/6/24 and recevied no stents.  PLEASE CONTINUE ASPIRIN 81MG DAILY AND PLAVIX 75MG DAILY. DO NOT STOP THESE MEDICATIONS FOR ANY REASON AS THEY ARE KEEPING YOUR STENT OPEN AND PREVENTING A HEART ATTACK.   Avoid strenuous activity or heavy lifting anything more than 5lbs for the next five days. Do not take a bath or swim for the next five days; you may shower. For any bleeding or hematoma formation (hardened blood collection under the skin) at the access site of your R wrist please hold pressure and go to the emergency room. Please follow up with Dr. Miller in 1-2 weeks. For recurrent chest pain, please call your doctor or go to the emergency room.         SECONDARY DISCHARGE DIAGNOSES  Diagnosis: Hypothyroidism  Assessment and Plan of Treatment: Please continue levothyroxine 50mcg daily for hypothyroidism.    Diagnosis: Heart failure with mid-range ejection fraction  Assessment and Plan of Treatment: Please continue [id number] daily for your heart failure.    Diagnosis: DM (diabetes mellitus)  Assessment and Plan of Treatment: Your Hemoglobin A1c is 10.1 and your goal A1c is less than 7.0%. This number measures your average blood sugar level over the last three months.  Please continue insulin novolung as listed for diabetes. Maintain a low carbohydrate, low sugar diet, exercise, monitor your fingerstick blood sugars regarly and follow up with your Endocrinologist/Primary Care Doctor.    Diagnosis: HLD (hyperlipidemia)  Assessment and Plan of Treatment:     Diagnosis: Hypothyroidism  Assessment and Plan of Treatment:     Diagnosis: HTN (hypertension)  Assessment and Plan of Treatment:      PRINCIPAL DISCHARGE DIAGNOSIS  Diagnosis: Unstable angina pectoris  Assessment and Plan of Treatment: You came to the hospital for evaluation of your chest pain, which has since then resolved. You had cardiac enzymes which were negative, revealing no damage to the heart muscle, or a heart attack. You had an Echocardiogram (ultrasound of the heart) which was unchanged from prior echo at [facility].   You underwent a cardiac catheterization on 1/6/24 which found mild disease of your coronary arteries and no stents were placed.   PLEASE CONTINUE ASPIRIN 81MG DAILY AND PLAVIX 75MG DAILY. DO NOT STOP THESE MEDICATIONS FOR ANY REASON AS THEY ARE KEEPING YOUR STENT OPEN AND PREVENTING A HEART ATTACK.   Avoid strenuous activity or heavy lifting anything more than 5lbs for the next five days. Do not take a bath or swim for the next five days; you may shower. For any bleeding or hematoma formation (hardened blood collection under the skin) at the access site of your R groin please hold pressure and go to the emergency room. Please follow up with Dr. Miller in 1-2 weeks. For recurrent chest pain, please call your doctor or go to the emergency room.         SECONDARY DISCHARGE DIAGNOSES  Diagnosis: Hypothyroidism  Assessment and Plan of Treatment: Please continue levothyroxine 50mcg daily for hypothyroidism.    Diagnosis: Heart failure with mid-range ejection fraction  Assessment and Plan of Treatment: You have a weak heart, also known as Congestive Heart Failure (CHF). Heart failure is a condition in which the heart does not pump or fill with blood well. As a result, the heart lags behind in its job of moving blood throughout the body. This can lead to symptoms such as swelling, trouble breathing, and feeling tired. Your Ejection Fraction (EF) is 50%,  normal EF is 50-55%.  -Please continue farxiga 10mg daily.    Diagnosis: DM (diabetes mellitus)  Assessment and Plan of Treatment: Your Hemoglobin A1c is 10.1 and your goal A1c is less than 7.0%. This number measures your average blood sugar level over the last three months.  Please continue insulin novolog 3-5 units with meals and insulin lantus 8 units at bedtime as instructed.  Maintain a low carbohydrate, low sugar diet, exercise, monitor your fingerstick blood sugars regularly and follow up with your Endocrinologist/Primary Care Doctor in 2 weeks.    Diagnosis: HLD (hyperlipidemia)  Assessment and Plan of Treatment: Please continue atorvastatin 40mg daily and zetia 10mg daily to control your cholesterol.    Diagnosis: HTN (hypertension)  Assessment and Plan of Treatment: Please START losartan 50mg daily for your blood pressure and CONTINUE Toprol 25mg daily and Imdur 60mg daily     PRINCIPAL DISCHARGE DIAGNOSIS  Diagnosis: Unstable angina pectoris  Assessment and Plan of Treatment: You came to the hospital for evaluation of your chest pain, which has since then resolved. You had cardiac enzymes which were negative, revealing no damage to the heart muscle, or a heart attack. You had an Echocardiogram (ultrasound of the heart) which was unchanged from prior echo at [facility].   You underwent a cardiac catheterization on 1/6/24 which found mild disease of your coronary arteries and no stents were placed.   PLEASE CONTINUE ASPIRIN 81MG DAILY AND PLAVIX 75MG DAILY. DO NOT STOP THESE MEDICATIONS FOR ANY REASON AS THEY ARE KEEPING YOUR STENT OPEN AND PREVENTING A HEART ATTACK.   Avoid strenuous activity or heavy lifting anything more than 5lbs for the next five days. Do not take a bath or swim for the next five days; you may shower. For any bleeding or hematoma formation (hardened blood collection under the skin) at the access site of your R groin please hold pressure and go to the emergency room. Please follow up with Dr. Miller in 1-2 weeks. For recurrent chest pain, please call your doctor or go to the emergency room.         SECONDARY DISCHARGE DIAGNOSES  Diagnosis: Hypothyroidism  Assessment and Plan of Treatment: Please continue levothyroxine 50mcg daily for hypothyroidism.    Diagnosis: Heart failure with mid-range ejection fraction  Assessment and Plan of Treatment: You have a weak heart, also known as Congestive Heart Failure (CHF). Heart failure is a condition in which the heart does not pump or fill with blood well. As a result, the heart lags behind in its job of moving blood throughout the body. This can lead to symptoms such as swelling, trouble breathing, and feeling tired. Your Ejection Fraction (EF) is 50%,  normal EF is 50-55%.  -Please continue farxiga 10mg daily.    Diagnosis: DM (diabetes mellitus)  Assessment and Plan of Treatment: Your Hemoglobin A1c is 10.1 and your goal A1c is less than 7.0%. This number measures your average blood sugar level over the last three months.  Please continue insulin novolog 3-5 units with meals and insulin lantus 8 units at bedtime as instructed.  Maintain a low carbohydrate, low sugar diet, exercise, monitor your fingerstick blood sugars regularly and follow up with your Endocrinologist/Primary Care Doctor in 2 weeks.    Diagnosis: HLD (hyperlipidemia)  Assessment and Plan of Treatment: Please continue atorvastatin 40mg daily and zetia 10mg daily to control your cholesterol.    Diagnosis: HTN (hypertension)  Assessment and Plan of Treatment: Please START losartan 50mg daily for your blood pressure and CONTINUE Toprol 25mg daily and Imdur 60mg daily

## 2024-01-06 NOTE — DISCHARGE NOTE PROVIDER - NSDCFUSCHEDAPPT_GEN_ALL_CORE_FT
Melissa Richardson  [facility]  ENDOCRIN [address]  Scheduled Appointment: 01/24/2024    Mayela Beebe  [facility]  HEARTVASC [address]  Scheduled Appointment: 03/12/2024     Melissa Richardson  [facility]  ENDOCRIN [address]  Scheduled Appointment: 01/24/2024    Mayela Beebe  [facility]  HEARTVASC [address]  Scheduled Appointment: 03/12/2024

## 2024-01-06 NOTE — DISCHARGE NOTE PROVIDER - CARE PROVIDER_API CALL
Fredis Cochran  Cardiology  [address] [place] [address]  Phone: [phone]  Fax: [phone]  Follow Up Time: 1 week   Fredis Cochran  Cardiology  [address] [place] [address]  Phone: [phone]  Fax: [phone]  Follow Up Time: 1 week

## 2024-01-06 NOTE — DISCHARGE NOTE PROVIDER - NSDCMRMEDTOKEN_GEN_ALL_CORE_FT
aspirin 81 mg oral delayed release tablet: 1 tab(s) orally once a day  atorvastatin 40 mg oral tablet: 1 tab(s) orally once a day (at bedtime)  Basaglar KwikPen 100 units/mL subcutaneous solution: 8 unit(s) subcutaneous once a day (at bedtime)  clopidogrel 75 mg oral tablet: 1 tab(s) orally once a day  Farxiga 10 mg oral tablet: 1 tab(s) orally once a day  Imdur 60 mg oral tablet, extended release: 1 tab(s) orally once a day  lansoprazole 30 mg oral tablet, disintegratin tab(s) orally once a day (before a meal)  levothyroxine 50 mcg (0.05 mg) oral tablet: 1 tab(s) orally once a day  metoprolol succinate 25 mg oral tablet, extended release: 1 tab(s) orally every 24 hours  NovoLOG FlexPen 100 units/mL injectable solution: 5 unit(s) subcutaneous 3 times a day (with meals)   Zetia 10 mg oral tablet: 1 tab(s) orally once a day   aspirin 81 mg oral delayed release tablet: 1 tab(s) orally once a day  atorvastatin 40 mg oral tablet: 1 tab(s) orally once a day (at bedtime)  Basaglar KwikPen 100 units/mL subcutaneous solution: 8 unit(s) subcutaneous once a day (at bedtime)  clopidogrel 75 mg oral tablet: 1 tab(s) orally once a day  Cozaar 50 mg oral tablet: 1 tab(s) orally once a day  Farxiga 10 mg oral tablet: 1 tab(s) orally once a day  Imdur 60 mg oral tablet, extended release: 1 tab(s) orally once a day  lansoprazole 30 mg oral tablet, disintegratin tab(s) orally once a day (before a meal)  levothyroxine 50 mcg (0.05 mg) oral tablet: 1 tab(s) orally once a day  metoprolol succinate 25 mg oral tablet, extended release: 1 tab(s) orally every 24 hours  NovoLOG FlexPen 100 units/mL injectable solution: 5 unit(s) subcutaneous 3 times a day (with meals)   Zetia 10 mg oral tablet: 1 tab(s) orally once a day

## 2024-01-06 NOTE — DISCHARGE NOTE PROVIDER - PROVIDER TOKENS
PROVIDER:[TOKEN:[[id number]],FOLLOWUP:[1 week]] PROVIDER:[TOKEN:[[id number]],FOLLOWUP:[1 week]]

## 2024-01-09 ENCOUNTER — APPOINTMENT (OUTPATIENT)
Dept: HEART AND VASCULAR | Facility: CLINIC | Age: 89
End: 2024-01-09
Payer: MEDICARE

## 2024-01-09 PROCEDURE — 99442: CPT | Mod: 93

## 2024-01-10 DIAGNOSIS — I50.22 CHRONIC SYSTOLIC (CONGESTIVE) HEART FAILURE: ICD-10-CM

## 2024-01-10 DIAGNOSIS — Z79.84 LONG TERM (CURRENT) USE OF ORAL HYPOGLYCEMIC DRUGS: ICD-10-CM

## 2024-01-10 DIAGNOSIS — I11.0 HYPERTENSIVE HEART DISEASE WITH HEART FAILURE: ICD-10-CM

## 2024-01-10 DIAGNOSIS — Z90.49 ACQUIRED ABSENCE OF OTHER SPECIFIED PARTS OF DIGESTIVE TRACT: ICD-10-CM

## 2024-01-10 DIAGNOSIS — Z95.5 PRESENCE OF CORONARY ANGIOPLASTY IMPLANT AND GRAFT: ICD-10-CM

## 2024-01-10 DIAGNOSIS — I25.110 ATHEROSCLEROTIC HEART DISEASE OF NATIVE CORONARY ARTERY WITH UNSTABLE ANGINA PECTORIS: ICD-10-CM

## 2024-01-10 DIAGNOSIS — H40.9 UNSPECIFIED GLAUCOMA: ICD-10-CM

## 2024-01-10 DIAGNOSIS — Z79.890 HORMONE REPLACEMENT THERAPY: ICD-10-CM

## 2024-01-10 DIAGNOSIS — E11.65 TYPE 2 DIABETES MELLITUS WITH HYPERGLYCEMIA: ICD-10-CM

## 2024-01-10 DIAGNOSIS — Z79.02 LONG TERM (CURRENT) USE OF ANTITHROMBOTICS/ANTIPLATELETS: ICD-10-CM

## 2024-01-10 DIAGNOSIS — E78.5 HYPERLIPIDEMIA, UNSPECIFIED: ICD-10-CM

## 2024-01-10 DIAGNOSIS — Z86.16 PERSONAL HISTORY OF COVID-19: ICD-10-CM

## 2024-01-10 DIAGNOSIS — E03.9 HYPOTHYROIDISM, UNSPECIFIED: ICD-10-CM

## 2024-01-10 DIAGNOSIS — Z79.82 LONG TERM (CURRENT) USE OF ASPIRIN: ICD-10-CM

## 2024-01-10 DIAGNOSIS — E11.51 TYPE 2 DIABETES MELLITUS WITH DIABETIC PERIPHERAL ANGIOPATHY WITHOUT GANGRENE: ICD-10-CM

## 2024-01-10 DIAGNOSIS — Z79.4 LONG TERM (CURRENT) USE OF INSULIN: ICD-10-CM

## 2024-01-24 ENCOUNTER — APPOINTMENT (OUTPATIENT)
Dept: ENDOCRINOLOGY | Facility: CLINIC | Age: 89
End: 2024-01-24

## 2024-01-24 NOTE — ASSESSMENT
[FreeTextEntry1] : 1. T2D A1C goal <8% A1C - 10.2% (11/29/23) from 10% ( 5/8/23) from 11.3% (2/6/23) from 10% (10/21/22) Current regimen: Lantus 10 units qhs, Novolog per scale: If pre-meal sugar <100, no Novolog, 100-150: 2 units, 151-200: 4 units, 201-250: 5 units, 251-300: 6 units, >300: 7 units. Daughter is administering insulin and monitoring patients BG with Libre2, verbalizes ranges (As above) with intermittent hypoglycemia Glucometer reading performed in office today is 221 <1H postprandial to tea with sugar  Daughter continues to endorse lability of glycemic patterns, now more ofter hyperglycemic following dinner, characterized as 2H postprandial BG to upper 200-300s following dinner.   Discussed glycemic goals of A1C <8.5%, fasting -150 to be conservative and 2H postprandial <225. Recommended to not adjust basal insulin based on bedtime BG. Prefers scale, recommend adjustments, as below: -- continue Lantus 10 units daily, if fasting BG become <100, to decrease to 8 units daily -- For Novolog, continue same scale for Breakfast and for lunch (If pre-meal sugar <100, no Novolog, 100-150: 2 units, 151-200: 4 units, 201-250: 5 units, 251-300: 6 units, >300: 7 units) -- For dinner coverage increase to if pre-meal sugar <100, 1 unit, 100-150: 3 units, 151-200: 5 units, 201-250: 6 units, 251-300: 7 units, >300: 8 units) -- Continue use of Freestyle Libre2 -- I will call to review BG in 1 week for further titration, as needed  JOSE has diabetes mellitus, has been using a home blood glucose monitor and reports testing 4 times a day using the meter. she is insulin-treated with three or more daily injections of insulin. I have seen the JOSE within the last 6 months, I have observed their glucose control and determined that based on the above, the patient requires a therapeutic CGM to properly manage their blood sugars. her insulin treatment regimen requires frequent adjustments on the basis of therapeutic CGM testing results. JOSE will have a follow-up visit within the first 6 months of CGM prescription in order to evaluate adherence to CGM regimen and their diabetes treatment plan.  2. HLD On atorvastatin 40mg 2/6/23  -- continue statin, as above  4. Hypothyroidism Current thyroid regimen: Synthroid 75mcg QAM, on this dose for years 2/6/23 TSH 5 -- continue LT4 75mcg -- repeat with next labs  I will call daughter in 1 week to FU on BG with adjustments Follow up in 2 months, or sooner, as needed  Melissa [facility] MS, FNP-BC, [facility] [place] 11/29/2023

## 2024-01-24 NOTE — HISTORY OF PRESENT ILLNESS
[FreeTextEntry1] : JOSE PABLO is a 88 year female with pmhx of T2D (>30 years ago), HTN, HLD, hypothyroidism who presents for T2D evaluation.  Presents with Alana, daughter, providing [place] interpretation, as needed.  She is active in mothers care  Last visit with myself-- 11/29/2023  Interval change: Since last visit, mutafia calls to FU on glycemic control and need for eye surgery Has adjusted Novolog scale before dinner to increase 1 unit + scale: If pre-meal sugar <100, 2 unit, 100-150: 4 units, 151-200: 6 units, 201-250: 7 units, 251-300: 8 units, >300: 9 units) Per daughter +variability of sugar. More highs, particularly following dinner Using Freestyle Ceci 2, reader not available for review, left at home. Verbalizes ranges, as below   A1C - 10.2% (11/29/23) from 10% (5/8/23) from11.3% (2/6/23) from 10% (10/21/22) Office Fingerstick --221 (<1H PP: 1 fried egg, 1 toast and coffee, tea with sugar right before visit)  Current medication: - Lantus 10 units at night  - Novolog per scale-- If pre-meal sugar is <100, no Novolog, 100-150: 2 units, 151-200: 4 units, 201-250: 5 units, 251-300: 6 units, >300: 7 units Telmisartan 40mg Atorvastatin 40mg  Past medication: - metformin 1000mg, stopped with starting MDI and GI upset - Januvia - actos - glyburide  JOSE reports they take their diabetes medication ALL of the time. JSOE ENDORSES hypoglycemia symptoms or BG <70, as above  Diabetes Self-Management: -- fasting 100 this morning, typically 120-150s -- after breakfast low 100s today, mid 100s -- after lunch low 200 -- after dinner typically 300s, pending eating -- bedtime: HIGH at times  Diet-- Typically consumes ~2-3 meals/daily, +/- snacks - small meals - dinner is largest: carb (pasta, rice/beans +vegetables, sometimes protein)  Ophthalmology: >1 year, will schedule FU, referral provided 2/6/23 Podiatry: >1 year, will schedule FU, referral provided 2/6/23  2. Hypothyroidism Dx ~15 years ago Current regimen: Synthroid 75 mcg QAM daily - on this dose for years No h/o radiation exposure

## 2024-03-08 NOTE — PROGRESS NOTE ADULT - NUTRITIONAL ASSESSMENT
DISPO planning: patient will need shower chair for discharge. [Restricted in physically strenuous activity but ambulatory and able to carry out work of a light or sedentary nature] : Status 1- Restricted in physically strenuous activity but ambulatory and able to carry out work of a light or sedentary nature, e.g., light house work, office work [Normal] : affect appropriate

## 2024-03-12 ENCOUNTER — NON-APPOINTMENT (OUTPATIENT)
Age: 89
End: 2024-03-12

## 2024-03-12 ENCOUNTER — APPOINTMENT (OUTPATIENT)
Dept: HEART AND VASCULAR | Facility: CLINIC | Age: 89
End: 2024-03-12
Payer: MEDICARE

## 2024-03-12 VITALS
HEART RATE: 72 BPM | TEMPERATURE: 97.8 F | DIASTOLIC BLOOD PRESSURE: 87 MMHG | SYSTOLIC BLOOD PRESSURE: 164 MMHG | OXYGEN SATURATION: 98 % | HEIGHT: 56 IN | WEIGHT: 89 LBS | BODY MASS INDEX: 20.02 KG/M2

## 2024-03-12 PROCEDURE — 99214 OFFICE O/P EST MOD 30 MIN: CPT | Mod: 25

## 2024-03-12 PROCEDURE — 93000 ELECTROCARDIOGRAM COMPLETE: CPT

## 2024-03-12 RX ORDER — NETARSUDIL 0.2 MG/ML
0.02 SOLUTION/ DROPS OPHTHALMIC; TOPICAL
Refills: 0 | Status: ACTIVE | COMMUNITY

## 2024-03-12 RX ORDER — DORZOLAMIDE HYDROCHLORIDE 20 MG/ML
2 SOLUTION OPHTHALMIC
Refills: 0 | Status: ACTIVE | COMMUNITY

## 2024-03-12 RX ORDER — TIMOLOL MALEATE 5 MG/ML
0.5 SOLUTION OPHTHALMIC
Refills: 0 | Status: ACTIVE | COMMUNITY

## 2024-03-12 RX ORDER — TELMISARTAN 20 MG/1
20 TABLET ORAL
Qty: 90 | Refills: 3 | Status: DISCONTINUED | COMMUNITY
Start: 2022-09-29 | End: 2024-03-12

## 2024-03-12 RX ORDER — LATANOPROST/PF 0.005 %
0.01 DROPS OPHTHALMIC (EYE)
Refills: 0 | Status: ACTIVE | COMMUNITY

## 2024-03-12 RX ORDER — BRIMONIDINE TARTRATE 2 MG/MG
0.2 SOLUTION/ DROPS OPHTHALMIC
Refills: 0 | Status: ACTIVE | COMMUNITY

## 2024-03-12 NOTE — HISTORY OF PRESENT ILLNESS
[FreeTextEntry1] : 88 F [place] SPeaking CAD s/p PCI LAD  at [facility] IDDM HTN HLD Hypothyroid Balance disorder PAD UTI LV Dysfunction EF 49% PCI OM1 7/31/2023 PTCA of mid LAD Glaucoma  hospitalized in [facility] for COvid in December 2023 sent to [facility] for chest pain and new WMA patent stens since then she is very tired and fatigued noted to have low blood pressure at times was discharged on losartan since then she has been lightheaded and dizzy bp drops in the evenings to 100 systolic per daugher having chest pain is sharp stabbing pain from her back radiating her chest    ecg nsr nsst changes 3/12/24 Echo EF 50-55% 1/2024  Cardiac Cath patent stents 1/2024  Echo EF 49% 2023

## 2024-03-12 NOTE — ASSESSMENT
[FreeTextEntry1] : - initially met was on numerous antianginals including diltiazem, ranexa, metoprolol still with chest pain she had dizziness with ranexa and diltiazem - angina on imdur 30 mg at bedtime much better  - trial of lansoprazole for her stomach which has helped - CAD on atorvastatin 40 mg daily lowered from 80 given her advanced age her lipids will improve if her sugars improve - HTN stop losartan she is on farxiga 10 mg now - palpitations/angina on toprol xl 25 mg daily - LDL is elevated due to elevated glucose given her age would prefer we address her hyperglycemia first before increasing statin dose at ezetimibe - Her chest pain is atypical she had post PCI as well after intervention seems to be at rest i dont want to add additional antianginals for now may correlate with atrial tachycardia on holter monitor repeat holter for now -  will do CT head stat today for headache - stop losartan due to low bp she is very labile bp given her age i would keep her bp less than 160 mmHg to avoid dizziness and falls - fu in three months

## 2024-03-28 ENCOUNTER — APPOINTMENT (OUTPATIENT)
Dept: GERIATRICS | Facility: CLINIC | Age: 89
End: 2024-03-28

## 2024-03-28 NOTE — H&P ADULT - NSHPPHYSICALEXAM_GEN_ALL_CORE
Constitutional:  PT AXOX3 in NAD    Respiratory: Non Labored    Cardiovascular: S1S2 RRR    Gastrointestinal: R induration , mild tenderness, ecchymotic to R flank/pelvis    Extremities: warm no lesions    Vascular: R. Tri DP/PT palp fem  L Bi/Tri Dp/Pt    Neurological: intact motor sensori Patient was seen today for an educational visit:  -Reviewed education related to heart failure including education booklet entitled \"Living well with Heart Failure\".  -Reviewed patient contributing factors leading to heart failure  -Provided information on CHF clinic, services available to manage fluid volume and CHF stoplight  -Heart Failure GDMT medications reviewed  -Reviewed low sodium diet (2,000 mg) and 64 oz fluid restriction  -Educated on importance of monitoring daily weight, pt stated they do have a scale at home  -Information and instruction to access the [facility] My HF guide/Heart Failure interactive workbook via the link and QR code was provided  -Made follow-up appointment in clinic next week: 4/3  -BMP, BNP to be drawn in one week  Total time spent educatin mins    Flu vaccine: pt declined  Pneumo vaccine: pt declined    Luna Clements, MSN, RN  Heart Failure Nurse Navigator

## 2024-04-09 ENCOUNTER — APPOINTMENT (OUTPATIENT)
Dept: HEART AND VASCULAR | Facility: CLINIC | Age: 89
End: 2024-04-09
Payer: MEDICARE

## 2024-04-09 ENCOUNTER — NON-APPOINTMENT (OUTPATIENT)
Age: 89
End: 2024-04-09

## 2024-04-09 VITALS
BODY MASS INDEX: 20.92 KG/M2 | TEMPERATURE: 98 F | HEIGHT: 56 IN | SYSTOLIC BLOOD PRESSURE: 160 MMHG | DIASTOLIC BLOOD PRESSURE: 90 MMHG | OXYGEN SATURATION: 96 % | HEART RATE: 72 BPM | WEIGHT: 93 LBS

## 2024-04-09 DIAGNOSIS — R60.0 LOCALIZED EDEMA: ICD-10-CM

## 2024-04-09 PROCEDURE — 99214 OFFICE O/P EST MOD 30 MIN: CPT | Mod: 25

## 2024-04-09 PROCEDURE — G2211 COMPLEX E/M VISIT ADD ON: CPT | Mod: NC,1L

## 2024-04-09 PROCEDURE — 93000 ELECTROCARDIOGRAM COMPLETE: CPT

## 2024-04-09 PROCEDURE — 36415 COLL VENOUS BLD VENIPUNCTURE: CPT

## 2024-04-09 RX ORDER — LEVOTHYROXINE SODIUM 0.05 MG/1
50 TABLET ORAL DAILY
Refills: 0 | Status: ACTIVE | COMMUNITY

## 2024-04-09 RX ORDER — ISOSORBIDE MONONITRATE 60 MG
60 TABLET, EXTENDED RELEASE 24 HR ORAL DAILY
Refills: 0 | Status: ACTIVE | COMMUNITY

## 2024-04-09 NOTE — HISTORY OF PRESENT ILLNESS
[FreeTextEntry1] : 88 F [place] SPeaking CAD s/p PCI LAD  at [facility] IDDM HTN HLD Hypothyroid Balance disorder PAD UTI LV Dysfunction EF 49% PCI OM1 7/31/2023 PTCA of mid LAD Glaucoma  hospitalized in [facility] for COvid in December 2023 sent to [facility] for chest pain and new WMA patent stens since then she is very tired and fatigued noted to have low blood pressure at times was discharged on losartan losartan was stopped dizziness is much better still having her chest pain when she wore the monitora also c/o bilaleteral ankle swelling    ecg nsr nsst changes 4/9/24  Echo EF 50-55% 1/2024  Cardiac Cath patent stents 1/2024  Echo EF 49% 2023

## 2024-04-09 NOTE — ASSESSMENT
[FreeTextEntry1] : - initially met was on numerous antianginals including diltiazem, ranexa, metoprolol still with chest pain she had dizziness with ranexa and diltiazem - angina on imdur 30 mg at bedtime much better  - trial of lansoprazole for her stomach which has helped - CAD on atorvastatin 40 mg daily lowered from 80 given her advanced age her lipids will improve if her sugars improve - HTN stop losartan she is on farxiga 10 mg now - palpitations/angina on toprol xl 25 mg daily - LDL is elevated due to elevated glucose given her age would prefer we address her hyperglycemia first before increasing statin dose at ezetimibe - Her chest pain is atypical she had post PCI as well after intervention seems to be at rest i dont want to add additional antianginals for now may correlate with atrial tachycardia on holter monitor repeat holter for now awaiting results  -  will do CT head stat today for headache - HTN is very labile bp given her age i would keep her bp less than 160 mmHg to avoid dizziness and falls - fu in one month

## 2024-04-11 LAB
ALBUMIN SERPL ELPH-MCNC: 3.3 G/DL
ALP BLD-CCNC: 94 U/L
ALT SERPL-CCNC: 14 U/L
ANION GAP SERPL CALC-SCNC: 14 MMOL/L
AST SERPL-CCNC: 17 U/L
BILIRUB SERPL-MCNC: <0.2 MG/DL
BUN SERPL-MCNC: 23 MG/DL
CALCIUM SERPL-MCNC: 8.8 MG/DL
CHLORIDE SERPL-SCNC: 108 MMOL/L
CHOLEST SERPL-MCNC: 237 MG/DL
CO2 SERPL-SCNC: 18 MMOL/L
CREAT SERPL-MCNC: 0.67 MG/DL
EGFR: 84 ML/MIN/1.73M2
ESTIMATED AVERAGE GLUCOSE: 240 MG/DL
GLUCOSE SERPL-MCNC: 201 MG/DL
HBA1C MFR BLD HPLC: 10 %
HDLC SERPL-MCNC: 65 MG/DL
LDLC SERPL CALC-MCNC: 129 MG/DL
NONHDLC SERPL-MCNC: 172 MG/DL
POTASSIUM SERPL-SCNC: 4.2 MMOL/L
PROT SERPL-MCNC: 6.2 G/DL
SODIUM SERPL-SCNC: 141 MMOL/L
TRIGL SERPL-MCNC: 246 MG/DL

## 2024-04-11 RX ORDER — EVOLOCUMAB 140 MG/ML
140 INJECTION, SOLUTION SUBCUTANEOUS
Qty: 3 | Refills: 3 | Status: ACTIVE | COMMUNITY
Start: 2024-04-11 | End: 1900-01-01

## 2024-04-11 RX ORDER — ISOSORBIDE MONONITRATE 30 MG/1
30 TABLET, EXTENDED RELEASE ORAL DAILY
Qty: 90 | Refills: 2 | Status: DISCONTINUED | COMMUNITY
Start: 2023-07-05 | End: 2024-04-11

## 2024-04-11 RX ORDER — LEVOTHYROXINE SODIUM 50 UG/1
50 TABLET ORAL DAILY
Refills: 0 | Status: DISCONTINUED | COMMUNITY
Start: 2022-10-27 | End: 2024-04-11

## 2024-04-18 RX ORDER — INSULIN ASPART 100 [IU]/ML
100 INJECTION, SOLUTION INTRAVENOUS; SUBCUTANEOUS
Qty: 3 | Refills: 1 | Status: ACTIVE | COMMUNITY
Start: 2022-11-23 | End: 1900-01-01

## 2024-04-20 NOTE — ED PROVIDER NOTE - NS ED MD DISPO DIVISION
HOSPITAL DISCHARGE SUMMARY  [facility]      Patient:  Logan Licea  : 1953  PCP:  Jamar Rasmussen DO      Date of admission:  2024 12:15 PM  Admission diagnosis:  Cellulitis of left lower extremity [L03.116]  Admitting provider:  Dr. Basia Villatoro    Active Hospital Problems    Diagnosis     Cellulitis of left lower extremity        Date of discharge:  No discharge date for patient encounter.  Disposition:  Discharge home  Discharge diagnosis:  Cellulitis of left lower extremity      Medication changes:  - Take Augmentin 875-125 mg BID for 10 days  - Start nicotine patch 21mg/24 hour patch    Discharge medications:      Summary of your Discharge Medications      You have not been prescribed any medications.         Results pending at the time of discharge:   - None    Tests to be repeated after discharge:   - None    DME provided:   - None    Home services:   - None    Future Appointments   Date Time Provider Department Center   2024  8:15 AM Unique Ann, PT [facility]   2024  2:30 PM [facility] ACUTE PROVIDER [id number]         Code Status: Full Resuscitation    Additional discharge instructions:  - Keep dressing/compression in place per PT wound care and follow-up on  as an outpatient  -Varicose veins noted to right shin. Consider outpatient US venous reflux study once cellulitis heals      -----------------------------------------------------------------------------------      Hospital course:    Logan Licea is a 70 year old male with history of HTN, tobacco use disorder, cellulitis of left upper extremity who initially presented for evaluation of left leg wound 24 after scraping shin on cement which occurred about 2 months prior to admission. Admitted for c/f cellulitis. On arrival to ED, pt was afebrile wo leukocytosis, lactate neg, CRP wnl, ESR 26. XR tibia/fibula left without fracture but w/ subcutaneous soft tissue swelling.  Wound had 2 ulcers, 1 with purulent discharge. Pt admitted with wound care on consult as well as infectious disease team. Patient was started on CTX 4/17/24 and vanc was added by ID 4/18/24. US LLE obtained 4/18/24 showing 2X5 cm subcutaneous loculated fluid pocket uncertain if amenable to drainage. General surgery consulted but no plan for surgical intervention. A1c obtained which was wnl. Wound cultures growing many pansusceptible e. Coli with rare gram positive bacilli. Less likely MRSA given bacilli growing. Patient was discharged on Augmentin 825 BID x10 days with plan to follow up with outpatient wound care.    Significant results:    Anaerobic/aerobic wound culture collected 4/18 growing many E. coli and rare gram-positive steroids  Aerobic wound culture collected 4/17 growing pan susceptible E. coli and rare gram-positive bacilli    US SOFT TISSUE LOWER EXTREMITY LEFT   Final Result   The wound communicates with a 2 x 5 cm subcutaneous loculated fluid pocket.   It is uncertain if this is amenable to drainage. Moderate surrounding   superficial subcutaneous edema which may represent cellulitis.      Electronically Signed by: MAXIMO COYLE MD    Signed on: 4/18/2024 11:46 AM    Workstation ID: [id number]      US VASC LOWER EXTREMITY VENOUS DUPLEX LEFT   Final Result   This examination is considered negative for acute deep venous   thrombosis within the left lower extremity.               Electronically Signed by: ABY PRADHAN MD    Signed on: 4/17/2024 5:29 PM    Workstation ID: [id number]      XR TIBIA FIBULA 2 VIEWS LEFT   Final Result   No acute fracture or dislocation. Subcutaneous soft tissue swelling.                        Electronically Signed by: DARRELL PULIDO M.D.    Signed on: 4/17/2024 3:01 PM    Workstation ID: [id number]            Procedures performed:    PT wound care with bedside debridements        Physical examination the time of discharge:  Vital Signs: Temp: 98.6 °F (37 °C),  Heart Rate: 66, BP: (!) 143/78, Resp: 16, SpO2: 97 % (04/20/24 0534)     Physical Exam  Constitutional:       General: He is not in acute distress.     Appearance: Normal appearance.   HENT:      Head: Normocephalic and atraumatic.      Right Ear: External ear normal.      Left Ear: External ear normal.      Nose: Nose normal. No congestion.      Mouth/Throat:      Mouth: Mucous membranes are moist.      Pharynx: Oropharynx is clear. No oropharyngeal exudate or posterior oropharyngeal erythema.      Neck: Normal range of motion. No rigidity.   Eyes:      General: No scleral icterus.     Conjunctiva/sclera: Conjunctivae normal.      Pupils: Pupils are equal, round, and reactive to light.   Cardiovascular:      Rate and Rhythm: Normal rate and regular rhythm.      Pulses: Normal pulses.      Heart sounds: No murmur heard.  Pulmonary:      Effort: Pulmonary effort is normal. No respiratory distress.      Breath sounds: No wheezing or rhonchi.      Comments: Decreased but clear breath sounds throughout  Abdominal:      General: Abdomen is flat. Bowel sounds are normal.      Palpations: Abdomen is soft.      Tenderness: There is no abdominal tenderness. There is no guarding or rebound.   Musculoskeletal:      Comments: Left shin w/ bandage and compression sock. Area around bandage with slight erythema but minimal tenderness. Strikethrough noted to the bandage    RLE with varicose veins appreciated to anterior shin   Skin:     General: Skin is warm and dry.      Findings: No lesion.   Neurological:      Mental Status: He is alert and oriented to person, place, and time.   Psychiatric:         Mood and Affect: Mood normal.         Behavior: Behavior normal.           I initiated transition of care by notifying the patient's primary care provider of their discharge from the hospital.      Patient seen and discussed with attending physician, Dr. Ascencio.         Signed,  Matt Gaspar MD  Family Medicine, PGY-2       [facility]

## 2024-05-06 RX ORDER — INSULIN GLARGINE 100 [IU]/ML
8 INJECTION, SOLUTION SUBCUTANEOUS
Refills: 0 | DISCHARGE

## 2024-05-06 RX ORDER — RANOLAZINE 500 MG/1
1 TABLET, FILM COATED, EXTENDED RELEASE ORAL
Qty: 0 | Refills: 0 | DISCHARGE

## 2024-05-06 RX ORDER — ISOSORBIDE MONONITRATE 60 MG/1
1 TABLET, EXTENDED RELEASE ORAL
Refills: 0 | DISCHARGE

## 2024-05-06 RX ORDER — EZETIMIBE 10 MG/1
1 TABLET ORAL
Refills: 0 | DISCHARGE

## 2024-05-06 RX ORDER — CILOSTAZOL 100 MG/1
1 TABLET ORAL
Qty: 0 | Refills: 0 | DISCHARGE

## 2024-05-06 RX ORDER — TELMISARTAN 20 MG/1
1 TABLET ORAL
Qty: 0 | Refills: 0 | DISCHARGE

## 2024-05-06 RX ORDER — ATORVASTATIN CALCIUM 80 MG/1
1 TABLET, FILM COATED ORAL
Refills: 0 | DISCHARGE

## 2024-05-06 RX ORDER — NITROGLYCERIN 6.5 MG
1 CAPSULE, EXTENDED RELEASE ORAL
Qty: 0 | Refills: 0 | DISCHARGE

## 2024-05-06 RX ORDER — LEVOTHYROXINE SODIUM 125 MCG
1 TABLET ORAL
Refills: 0 | DISCHARGE

## 2024-05-06 RX ORDER — LANSOPRAZOLE 15 MG/1
1 CAPSULE, DELAYED RELEASE ORAL
Refills: 0 | DISCHARGE

## 2024-05-06 RX ORDER — METFORMIN HYDROCHLORIDE 850 MG/1
1 TABLET ORAL
Qty: 0 | Refills: 0 | DISCHARGE

## 2024-05-06 RX ORDER — METFORMIN HYDROCHLORIDE 850 MG/1
1 TABLET ORAL
Refills: 0 | DISCHARGE

## 2024-05-14 ENCOUNTER — NON-APPOINTMENT (OUTPATIENT)
Age: 89
End: 2024-05-14

## 2024-05-14 ENCOUNTER — APPOINTMENT (OUTPATIENT)
Dept: HEART AND VASCULAR | Facility: CLINIC | Age: 89
End: 2024-05-14
Payer: MEDICARE

## 2024-05-14 VITALS — OXYGEN SATURATION: 95 % | SYSTOLIC BLOOD PRESSURE: 191 MMHG | DIASTOLIC BLOOD PRESSURE: 89 MMHG | TEMPERATURE: 98.1 F

## 2024-05-14 VITALS — DIASTOLIC BLOOD PRESSURE: 75 MMHG | HEART RATE: 68 BPM | SYSTOLIC BLOOD PRESSURE: 151 MMHG

## 2024-05-14 VITALS — TEMPERATURE: 98.1 F | HEIGHT: 56 IN | DIASTOLIC BLOOD PRESSURE: 89 MMHG | SYSTOLIC BLOOD PRESSURE: 191 MMHG

## 2024-05-14 DIAGNOSIS — I25.10 ATHEROSCLEROTIC HEART DISEASE OF NATIVE CORONARY ARTERY W/OUT ANGINA PECTORIS: ICD-10-CM

## 2024-05-14 DIAGNOSIS — Z98.61 ATHEROSCLEROTIC HEART DISEASE OF NATIVE CORONARY ARTERY W/OUT ANGINA PECTORIS: ICD-10-CM

## 2024-05-14 PROCEDURE — 36415 COLL VENOUS BLD VENIPUNCTURE: CPT

## 2024-05-14 PROCEDURE — 99214 OFFICE O/P EST MOD 30 MIN: CPT | Mod: 25

## 2024-05-14 PROCEDURE — 93246 EXT ECG>7D<15D RECORDING: CPT

## 2024-05-14 PROCEDURE — G2211 COMPLEX E/M VISIT ADD ON: CPT | Mod: NC,1L

## 2024-05-14 PROCEDURE — 93000 ELECTROCARDIOGRAM COMPLETE: CPT

## 2024-05-14 NOTE — ASSESSMENT
[FreeTextEntry1] : - initially met was on numerous antianginals including diltiazem, ranexa, metoprolol still with chest pain she had dizziness with ranexa and diltiazem - angina on imdur 60 mg at bedtime much better  - trial of lansoprazole for her stomach which has helped - CAD on atorvastatin 40 mg daily lowered from 80 given her advanced age her lipids will improve if her sugars improve - HTN stop losartan she is on farxiga 10 mg now - palpitations/angina on toprol xl 25 mg daily repeat holter to see if chest pain is arrhythmic - LDL is elevated due to elevated glucose given her age would prefer we address her hyperglycemia first before increasing statin dose at ezetimibe - Her chest pain is atypical she had post PCI as well after intervention seems to be at rest i dont want to add additional antianginals for now may correlate with atrial tachycardia on holter monitor repeat holter for now awaiting results  -  will do CT head stat today for headache - HTN is very labile bp given her age i would keep her bp less than 160 mmHg to avoid dizziness and falls - fu in 3 months

## 2024-05-14 NOTE — HISTORY OF PRESENT ILLNESS
[FreeTextEntry1] : 88 F [place] SPeaking CAD s/p PCI LAD  at [facility] IDDM HTN HLD Hypothyroid Balance disorder PAD UTI LV Dysfunction EF 49% PCI OM1 7/31/2023 PTCA of mid LAD Glaucoma   having a myriad complaints low bp two weeks ago resolved with salt. feels tired. labile finger sticks. chest pain is better. urine has bubbles in it. but no other urinary symptoms. subjective chills.    ecg nsr nsst changes 5/14/24 Echo EF 50-55% 1/2024  Cardiac Cath patent stents 1/2024  Echo EF 49% 2023

## 2024-05-15 DIAGNOSIS — R06.02 SHORTNESS OF BREATH: ICD-10-CM

## 2024-05-15 PROBLEM — I10 ESSENTIAL (PRIMARY) HYPERTENSION: Chronic | Status: ACTIVE | Noted: 2022-11-21

## 2024-05-15 PROBLEM — E78.5 HYPERLIPIDEMIA, UNSPECIFIED: Chronic | Status: ACTIVE | Noted: 2023-07-11

## 2024-05-15 PROBLEM — E03.9 HYPOTHYROIDISM, UNSPECIFIED: Chronic | Status: ACTIVE | Noted: 2022-11-21

## 2024-05-15 PROBLEM — E11.9 TYPE 2 DIABETES MELLITUS WITHOUT COMPLICATIONS: Chronic | Status: ACTIVE | Noted: 2023-07-11

## 2024-05-15 PROBLEM — I25.10 ATHEROSCLEROTIC HEART DISEASE OF NATIVE CORONARY ARTERY WITHOUT ANGINA PECTORIS: Chronic | Status: ACTIVE | Noted: 2022-11-20

## 2024-05-15 PROBLEM — R33.9 RETENTION OF URINE, UNSPECIFIED: Chronic | Status: ACTIVE | Noted: 2023-07-11

## 2024-05-15 PROBLEM — I73.9 PERIPHERAL VASCULAR DISEASE, UNSPECIFIED: Chronic | Status: ACTIVE | Noted: 2022-11-21

## 2024-05-15 PROBLEM — Z98.890 OTHER SPECIFIED POSTPROCEDURAL STATES: Chronic | Status: ACTIVE | Noted: 2022-11-21

## 2024-05-15 PROBLEM — S30.1XXA CONTUSION OF ABDOMINAL WALL, INITIAL ENCOUNTER: Chronic | Status: ACTIVE | Noted: 2022-11-21

## 2024-05-15 LAB
ALBUMIN SERPL ELPH-MCNC: 3.1 G/DL
ALP BLD-CCNC: 104 U/L
ALT SERPL-CCNC: 13 U/L
ANION GAP SERPL CALC-SCNC: 11 MMOL/L
AST SERPL-CCNC: 24 U/L
BILIRUB SERPL-MCNC: <0.2 MG/DL
BUN SERPL-MCNC: 25 MG/DL
CALCIUM SERPL-MCNC: 8.7 MG/DL
CHLORIDE SERPL-SCNC: 103 MMOL/L
CHOLEST SERPL-MCNC: 232 MG/DL
CO2 SERPL-SCNC: 21 MMOL/L
CREAT SERPL-MCNC: 0.79 MG/DL
EGFR: 72 ML/MIN/1.73M2
ESTIMATED AVERAGE GLUCOSE: 249 MG/DL
GLUCOSE SERPL-MCNC: 322 MG/DL
HBA1C MFR BLD HPLC: 10.3 %
HCT VFR BLD CALC: 34.2 %
HDLC SERPL-MCNC: 66 MG/DL
HGB BLD-MCNC: 11.4 G/DL
LDLC SERPL CALC-MCNC: 120 MG/DL
MCHC RBC-ENTMCNC: 32.1 PG
MCHC RBC-ENTMCNC: 33.3 GM/DL
MCV RBC AUTO: 96.3 FL
NONHDLC SERPL-MCNC: 166 MG/DL
NT-PROBNP SERPL-MCNC: 7524 PG/ML
PLATELET # BLD AUTO: 273 K/UL
POTASSIUM SERPL-SCNC: 5.4 MMOL/L
PROT SERPL-MCNC: 5.9 G/DL
RBC # BLD: 3.55 M/UL
RBC # FLD: 14 %
SODIUM SERPL-SCNC: 136 MMOL/L
TRIGL SERPL-MCNC: 264 MG/DL
WBC # FLD AUTO: 8.24 K/UL

## 2024-05-15 RX ORDER — SACUBITRIL AND VALSARTAN 24; 26 MG/1; MG/1
24-26 TABLET, FILM COATED ORAL
Qty: 60 | Refills: 5 | Status: ACTIVE | COMMUNITY
Start: 2024-05-15 | End: 1900-01-01

## 2024-05-22 ENCOUNTER — APPOINTMENT (OUTPATIENT)
Dept: HEART AND VASCULAR | Facility: CLINIC | Age: 89
End: 2024-05-22
Payer: MEDICARE

## 2024-05-22 ENCOUNTER — NON-APPOINTMENT (OUTPATIENT)
Age: 89
End: 2024-05-22

## 2024-05-22 VITALS
OXYGEN SATURATION: 98 % | WEIGHT: 90 LBS | BODY MASS INDEX: 20.24 KG/M2 | HEIGHT: 56 IN | DIASTOLIC BLOOD PRESSURE: 80 MMHG | SYSTOLIC BLOOD PRESSURE: 140 MMHG | TEMPERATURE: 99 F | HEART RATE: 76 BPM

## 2024-05-22 PROCEDURE — 93000 ELECTROCARDIOGRAM COMPLETE: CPT

## 2024-05-22 PROCEDURE — 99214 OFFICE O/P EST MOD 30 MIN: CPT | Mod: 25

## 2024-05-22 PROCEDURE — 36415 COLL VENOUS BLD VENIPUNCTURE: CPT

## 2024-05-22 PROCEDURE — G2211 COMPLEX E/M VISIT ADD ON: CPT | Mod: NC,1L

## 2024-05-24 LAB
ALBUMIN SERPL ELPH-MCNC: 3.4 G/DL
ALP BLD-CCNC: 94 U/L
ALT SERPL-CCNC: 12 U/L
ANION GAP SERPL CALC-SCNC: 11 MMOL/L
AST SERPL-CCNC: 13 U/L
BILIRUB SERPL-MCNC: 0.2 MG/DL
BUN SERPL-MCNC: 46 MG/DL
CALCIUM SERPL-MCNC: 9 MG/DL
CHLORIDE SERPL-SCNC: 105 MMOL/L
CO2 SERPL-SCNC: 20 MMOL/L
CREAT SERPL-MCNC: 0.91 MG/DL
EGFR: 61 ML/MIN/1.73M2
GLUCOSE SERPL-MCNC: 296 MG/DL
NT-PROBNP SERPL-MCNC: 3411 PG/ML
POTASSIUM SERPL-SCNC: 4.8 MMOL/L
PROT SERPL-MCNC: 6.5 G/DL
SODIUM SERPL-SCNC: 136 MMOL/L

## 2024-05-24 RX ORDER — SPIRONOLACTONE 25 MG/1
25 TABLET ORAL
Qty: 45 | Refills: 5 | Status: ACTIVE | COMMUNITY
Start: 2024-05-24 | End: 1900-01-01

## 2024-05-27 NOTE — HISTORY OF PRESENT ILLNESS
[FreeTextEntry1] : 88 F [place] SPeaking CAD s/p PCI LAD  at [facility] IDDM HTN HLD Hypothyroid Balance disorder PAD UTI LV Dysfunction EF 49% PCI OM1 7/31/2023 PTCA of mid LAD Glaucoma   stopped metoprolol due to low bp in the 80's taking torsemide as needed for leg edema feels much better on entresto    ecg nsr nsst changes  5/22/24 Echo EF 50-55% 1/2024  Cardiac Cath patent stents 1/2024  Echo EF 35% 2023

## 2024-05-27 NOTE — ASSESSMENT
[FreeTextEntry1] : - initially met was on numerous antianginals including diltiazem, ranexa, metoprolol still with chest pain she had dizziness with ranexa and diltiazem - angina off imdur due to entresto - trial of lansoprazole for her stomach which has helped - CAD on atorvastatin 40 mg daily lowered from 80 given her advanced age her lipids will improve if her sugars improve - HTN stop losartan she is on farxiga 10 mg now - palpitations/angina on toprol xl 25 mg daily repeat holter to see if chest pain is arrhythmic - LDL is elevated due to elevated glucose given her age would prefer we address her hyperglycemia first before increasing statin dose at ezetimibe - Her chest pain is atypical she had post PCI as well after intervention seems to be at rest i dont want to add additional antianginals for now may correlate with atrial tachycardia on holter monitor repeat holter for now awaiting results  - HTN is very labile bp given her age i would keep her bp less than 160 mmHg to avoid dizziness and falls - EF is reduced she is still volume overloaded would continue entresto 24/26 bid and torsemide 5 mg daily add aldactone 25 mg daily  - fu in 1 month.

## 2024-06-04 ENCOUNTER — APPOINTMENT (OUTPATIENT)
Dept: HEART AND VASCULAR | Facility: CLINIC | Age: 89
End: 2024-06-04
Payer: MEDICARE

## 2024-06-04 ENCOUNTER — NON-APPOINTMENT (OUTPATIENT)
Age: 89
End: 2024-06-04

## 2024-06-04 VITALS
SYSTOLIC BLOOD PRESSURE: 120 MMHG | HEART RATE: 66 BPM | HEIGHT: 56 IN | BODY MASS INDEX: 20.24 KG/M2 | TEMPERATURE: 98.1 F | WEIGHT: 90 LBS | DIASTOLIC BLOOD PRESSURE: 80 MMHG | OXYGEN SATURATION: 96 %

## 2024-06-04 DIAGNOSIS — I51.9 HEART DISEASE, UNSPECIFIED: ICD-10-CM

## 2024-06-04 PROCEDURE — 99214 OFFICE O/P EST MOD 30 MIN: CPT | Mod: 25

## 2024-06-04 PROCEDURE — G2211 COMPLEX E/M VISIT ADD ON: CPT | Mod: NC

## 2024-06-04 PROCEDURE — 93000 ELECTROCARDIOGRAM COMPLETE: CPT

## 2024-06-04 PROCEDURE — 36415 COLL VENOUS BLD VENIPUNCTURE: CPT

## 2024-06-04 RX ORDER — TORSEMIDE 5 MG/1
5 TABLET ORAL
Qty: 90 | Refills: 2 | Status: ACTIVE | COMMUNITY
Start: 2024-05-15

## 2024-06-05 NOTE — ASSESSMENT
[FreeTextEntry1] : - initially met was on numerous antianginals including diltiazem, ranexa, metoprolol still with chest pain she had dizziness with ranexa and diltiazem - angina off imdur due to entresto - trial of lansoprazole for her stomach which has helped - CAD on atorvastatin 40 mg daily lowered from 80 given her advanced age her lipids will improve if her sugars improve - palpitations/angina on toprol xl 25 mg daily repeat holter to see if chest pain is arrhythmic - LDL is elevated due to elevated glucose given her age would prefer we address her hyperglycemia first before increasing statin dose at ezetimibe - Her chest pain is atypical she had post PCI as well after intervention seems to be at rest i dont want to add additional antianginals for now may correlate with atrial tachycardia on holter monitor repeat holter for now awaiting results  - EF is reduced she is still volume overloaded would continue entresto 24/26 bid and torsemide 5 mg daily and aldactone 12.5 mg daily  - fu in 1 month.

## 2024-06-05 NOTE — PHYSICAL EXAM

## 2024-06-05 NOTE — HISTORY OF PRESENT ILLNESS
[FreeTextEntry1] : 88 F [place] SPeaking CAD s/p PCI LAD  at [facility] IDDM HTN HLD Hypothyroid Balance disorder PAD UTI LV Dysfunction EF 49% PCI OM1 7/31/2023 PTCA of mid LAD Glaucoma  stopped metoprolol due to low bp in the 80's taking torsemide as needed for leg edema feels much better on entresto taking motrin for pain    ecg nsr nsst changes  6/4/24 Echo EF 50-55% 1/2024  Cardiac Cath patent stents 1/2024  Echo EF 35% 2023

## 2024-06-06 LAB
ANION GAP SERPL CALC-SCNC: 13 MMOL/L
BUN SERPL-MCNC: 36 MG/DL
CALCIUM SERPL-MCNC: 9.1 MG/DL
CHLORIDE SERPL-SCNC: 108 MMOL/L
CO2 SERPL-SCNC: 16 MMOL/L
CREAT SERPL-MCNC: 0.87 MG/DL
EGFR: 64 ML/MIN/1.73M2
GLUCOSE SERPL-MCNC: 237 MG/DL
NT-PROBNP SERPL-MCNC: 6526 PG/ML
POTASSIUM SERPL-SCNC: 6 MMOL/L
SODIUM SERPL-SCNC: 137 MMOL/L

## 2024-06-07 ENCOUNTER — LABORATORY RESULT (OUTPATIENT)
Age: 89
End: 2024-06-07

## 2024-06-10 ENCOUNTER — LABORATORY RESULT (OUTPATIENT)
Age: 89
End: 2024-06-10

## 2024-06-11 ENCOUNTER — EMERGENCY (EMERGENCY)
Facility: HOSPITAL | Age: 89
LOS: 1 days | Discharge: HOME | End: 2024-06-11
Admitting: EMERGENCY MEDICINE
Payer: MEDICARE

## 2024-06-11 ENCOUNTER — LABORATORY RESULT (OUTPATIENT)
Age: 89
End: 2024-06-11

## 2024-06-11 VITALS
OXYGEN SATURATION: 99 % | TEMPERATURE: 98 F | HEART RATE: 70 BPM | DIASTOLIC BLOOD PRESSURE: 90 MMHG | SYSTOLIC BLOOD PRESSURE: 147 MMHG | RESPIRATION RATE: 16 BRPM

## 2024-06-11 VITALS
RESPIRATION RATE: 16 BRPM | TEMPERATURE: 98 F | OXYGEN SATURATION: 99 % | DIASTOLIC BLOOD PRESSURE: 70 MMHG | SYSTOLIC BLOOD PRESSURE: 157 MMHG | HEART RATE: 76 BPM

## 2024-06-11 DIAGNOSIS — E11.51 TYPE 2 DIABETES MELLITUS WITH DIABETIC PERIPHERAL ANGIOPATHY WITHOUT GANGRENE: ICD-10-CM

## 2024-06-11 DIAGNOSIS — E03.9 HYPOTHYROIDISM, UNSPECIFIED: ICD-10-CM

## 2024-06-11 DIAGNOSIS — H54.7 UNSPECIFIED VISUAL LOSS: ICD-10-CM

## 2024-06-11 DIAGNOSIS — I11.0 HYPERTENSIVE HEART DISEASE WITH HEART FAILURE: ICD-10-CM

## 2024-06-11 DIAGNOSIS — S20.212A CONTUSION OF LEFT FRONT WALL OF THORAX, INITIAL ENCOUNTER: ICD-10-CM

## 2024-06-11 DIAGNOSIS — I50.22 CHRONIC SYSTOLIC (CONGESTIVE) HEART FAILURE: ICD-10-CM

## 2024-06-11 DIAGNOSIS — Z90.49 ACQUIRED ABSENCE OF OTHER SPECIFIED PARTS OF DIGESTIVE TRACT: Chronic | ICD-10-CM

## 2024-06-11 DIAGNOSIS — R07.81 PLEURODYNIA: ICD-10-CM
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
## 2024-06-11 DIAGNOSIS — Z95.5 PRESENCE OF CORONARY ANGIOPLASTY IMPLANT AND GRAFT: Chronic | ICD-10-CM

## 2024-06-11 DIAGNOSIS — I25.10 ATHEROSCLEROTIC HEART DISEASE OF NATIVE CORONARY ARTERY WITHOUT ANGINA PECTORIS: ICD-10-CM

## 2024-06-11 DIAGNOSIS — E78.5 HYPERLIPIDEMIA, UNSPECIFIED: ICD-10-CM

## 2024-06-11 PROCEDURE — 71100 X-RAY EXAM RIBS UNI 2 VIEWS: CPT

## 2024-06-11 PROCEDURE — 96372 THER/PROPH/DIAG INJ SC/IM: CPT

## 2024-06-11 PROCEDURE — 71045 X-RAY EXAM CHEST 1 VIEW: CPT | Mod: 26

## 2024-06-11 PROCEDURE — 71045 X-RAY EXAM CHEST 1 VIEW: CPT

## 2024-06-11 PROCEDURE — 99284 EMERGENCY DEPT VISIT MOD MDM: CPT | Mod: 25

## 2024-06-11 PROCEDURE — 71100 X-RAY EXAM RIBS UNI 2 VIEWS: CPT | Mod: 26,LT

## 2024-06-11 RX ORDER — LIDOCAINE 4 G/100G
1 CREAM TOPICAL ONCE
Refills: 0 | Status: COMPLETED | OUTPATIENT
Start: 2024-06-11 | End: 2024-06-11

## 2024-06-11 RX ORDER — KETOROLAC TROMETHAMINE 30 MG/ML
15 SYRINGE (ML) INJECTION ONCE
Refills: 0 | Status: DISCONTINUED | OUTPATIENT
Start: 2024-06-11 | End: 2024-06-11

## 2024-06-11 RX ADMIN — LIDOCAINE 1 PATCH: 4 CREAM TOPICAL at 05:37

## 2024-06-11 RX ADMIN — Medication 15 MILLIGRAM(S): at 05:37

## 2024-06-11 RX ADMIN — Medication 15 MILLIGRAM(S): at 06:00

## 2024-06-11 RX ADMIN — LIDOCAINE 1 PATCH: 4 CREAM TOPICAL at 06:25

## 2024-06-11 NOTE — ED ADULT NURSE NOTE - OBJECTIVE STATEMENT
Received ambulatory with steady gait with chief complaints of R rib pain. Daughter states "She fell 5 days ago. She lost her balance and hit her R rib to the coffee table." Pt denies hitting head, LOC.     Patient AOX3, speaking full sentences, daughter @  bedside. Patient denies chest pain, shortness of breath, difficulty breathing and any form of distress not noted. Resps even and nonlabored. Moves all extremities. No obvious trauma/injury/deformity noted. Patient oriented to ED area. All needs attended. POC reviewed. Fall risk precautions maintained. Yellow gown and red socks in place. Pt was instructed to ask for help to go to the bathroom, patient verbalized understanding. Purposeful proactive hourly rounding in progress.

## 2024-06-11 NOTE — ED PROVIDER NOTE - PHYSICAL EXAMINATION
Vitals reviewed  Gen: well appearing elderly F, nad, speaking in full sentences, no hypoxia   Skin: wwp, no rash/lesions  HEENT: ncat, eomi, mmm, airway patent   CV: +s1/2, no audible m/r/g  Chest: mild ttp over L lower/lateral ribs w/ superficial abrasion, no ecchymosis or crepitus, no rash   Resp: symmetrical expansion, ctab, no w/r/r  Abd: nondistended, soft, nontender, no r/g, no cvat   Ext: FROM throughout, no peripheral edema, no muscle or joint ttp, distal pulses 2+  Neuro: alert/oriented, no focal deficits, steady gait

## 2024-06-11 NOTE — ED PROVIDER NOTE - NS ED ATTENDING NAME FT
Telephone Encounter by Natasha Centeno NCMA at 04/17/17 12:43 PM     Author:  Natasha Centeno NCMA Service:  (none) Author Type:  Certified Medical Assistant     Filed:  04/19/17 04:11 PM Encounter Date:  4/17/2017 Status:  Addendum     :  Natasha Centeno NCMA ([employer] [facility])            Patient needs appointment last visit 09/22/14[BP1.1M]       Revision History        User Key Date/Time User Provider Type Action    > BP1.1 04/19/17 04:11 PM Natasha Centeno NCMA Certified Medical Assistant Addend     [N/A] 04/17/17 12:46 PM Natasha Centeno NCMA Certified Medical Assistant Sign    M - Manual             Isabela

## 2024-06-11 NOTE — ED PROVIDER NOTE - PATIENT PORTAL LINK FT
You can access the FollowMyHealth Patient Portal offered by [facility] by registering at the following website: [email or web address]. By joining [facility]’s FollowMyHealth portal, you will also be able to view your health information using other applications (apps) compatible with our system.

## 2024-06-11 NOTE — ED PROVIDER NOTE - OBJECTIVE STATEMENT
88 F pmh HTN, HLD, CAD s/p multiple PCIs (most recently 7/2023 w/ MARTINA pOM1 and PTCA mLAD ISR), HFmEF (LVEF 49%), DM-II, Hypothyroidism, PAD s/p ? PTA, Glaucoma c/b L eye blindness, h/o urinary retention c/b UTIs p/w L rib pain s/p fall one week ago.  here with daughter who reports mechanical trip and fall; hit L ribs on side of table, no head trauma or loc.  has been c/o pain over L lateral ribs since.  taking tyelnol w/ some relief.  no sob/cough.  denies f/c, HA, neck/back pain, palpitations, BARRON, abd pain, nv, pain in extremities, other injuries

## 2024-06-11 NOTE — ED ADULT NURSE NOTE - NSFALLHARMRISKINTERV_ED_ALL_ED
Assistance OOB with selected safe patient handling equipment if applicable/Communicate risk of Fall with Harm to all staff, patient, and family/Provide visual cue: red socks, yellow wristband, yellow gown, etc/Reinforce activity limits and safety measures with patient and family/Bed in lowest position, wheels locked, appropriate side rails in place/Call bell, personal items and telephone in reach/Instruct patient to call for assistance before getting out of bed/chair/stretcher/Non-slip footwear applied when patient is off stretcher/[place] to call system/Physically safe environment - no spills, clutter or unnecessary equipment/Purposeful Proactive Rounding/Room/bathroom lighting operational, light cord in reach

## 2024-06-11 NOTE — ED ADULT NURSE NOTE - NS_ED_NURSE_TEACHING_TOPIC_ED_A_ED
Orthopedic High Dose Vitamin A Pregnancy And Lactation Text: High dose vitamin A therapy is contraindicated during pregnancy and breast feeding.

## 2024-06-11 NOTE — ED PROVIDER NOTE - CLINICAL SUMMARY MEDICAL DECISION MAKING FREE TEXT BOX
88 F pmh HTN, HLD, CAD s/p multiple PCIs (most recently 7/2023 w/ MARTINA pOM1 and PTCA mLAD ISR), HFmEF (LVEF 49%), DM-II, Hypothyroidism, PAD s/p ? PTA, Glaucoma c/b L eye blindness, h/o urinary retention c/b UTIs p/w L rib pain s/p fall one week ago; no head trauma or loc.  tripped and hit L ribs on side of table.  no sob.  on exam vss, comfortable appearing, elderly F, lungs ctab, +s1/2, Chest: mild ttp over L lower/lateral ribs w/ superficial abrasion, no ecchymosis or crepitus, no rash.  suspect rib contusion, r/o fx.  low suspicion for pneumonia or ptx.  will obtain rib/cxr and give toradol/lido patch for pain 88 F pmh HTN, HLD, CAD s/p multiple PCIs (most recently 7/2023 w/ MARTINA pOM1 and PTCA mLAD ISR), HFmEF (LVEF 49%), DM-II, Hypothyroidism, PAD s/p ? PTA, Glaucoma c/b L eye blindness, h/o urinary retention c/b UTIs p/w L rib pain s/p fall one week ago; no head trauma or loc.  tripped and hit L ribs on side of table.  no sob.  on exam vss, comfortable appearing, elderly F, lungs ctab, +s1/2, Chest: mild ttp over L lower/lateral ribs w/ superficial abrasion, no ecchymosis or crepitus, no rash.  suspect rib contusion, r/o fx.  low suspicion for pneumonia or ptx.  will obtain rib/cxr and give toradol/lido patch for pain    xray shows no acute fx or ptx.  reviewed w/ rads.  pain improved w/ meds.  recommend otc analgesia, lido patches and f/u with pmd.  discussed strict return parameters

## 2024-06-11 NOTE — ED PROVIDER NOTE - NSFOLLOWUPINSTRUCTIONS_ED_ALL_ED_FT
Take tylenol 650mg or motrin 400-800mg as needed every 4-6 hours for pain.     You can apply over the counter lidocaine pain patches (salonpas) to area to alleviate pain     Please call to arrange follow up with primary care doctor within one week    Rib Contusion  A rib contusion is a deep bruise on the rib area. Contusions are the result of a blunt trauma that causes bleeding and injury to the tissues under the skin. A rib contusion may involve bruising of the ribs and of the skin and muscles in the area. The skin over the contusion may turn blue, purple, or yellow. Minor injuries result in a painless contusion. More severe contusions may be painful and swollen for a few weeks.    What are the causes?  This condition is usually caused by a hard, direct hit to an area of the body. This often occurs while playing contact sports.    What are the signs or symptoms?  Symptoms of this condition include:  Swelling and redness of the injured area.  Discoloration of the injured area.  Tenderness and soreness of the injured area.  Pain with or without movement.  Pain when breathing in.  How is this diagnosed?  This condition may be diagnosed based on:  Your symptoms and medical history.  A physical exam.  Imaging tests—such as an X-ray, CT scan, or MRI—to determine if there were internal injuries or broken bones (fractures).  How is this treated?  This condition may be treated with:  Rest. This is often the best treatment for a rib contusion.  Ice packs. This reduces swelling and inflammation.  Deep-breathing exercises. These may be recommended to reduce the risk for lung collapse and pneumonia.  Medicines. Over-the-counter or prescription medicines may be given to control pain.  Injection of a numbing medicine around the nerve near your injury (nerve block).  Follow these instructions at home:  Medicines    Take over-the-counter and prescription medicines only as told by your health care provider.  Ask your health care provider if the medicine prescribed to you:  Requires you to avoid driving or using machinery.  Can cause constipation. You may need to take these actions to prevent or treat constipation:  Drink enough fluid to keep your urine pale yellow.  Take over-the-counter or prescription medicines.  Eat foods that are high in fiber, such as beans, whole grains, and fresh fruits and vegetables.  Limit foods that are high in fat and processed sugars, such as fried or sweet foods.  Managing pain, stiffness, and swelling      If directed, put ice on the injured area. To do this:  Put ice in a plastic bag.  Place a towel between your skin and the bag.  Leave the ice on for 20 minutes, 2–3 times a day.  Remove the ice if your skin turns bright red. This is very important. If you cannot feel pain, heat, or cold, you have a greater risk of damage to the area.  Activity    Rest the injured area.  Avoid strenuous activity and any activities or movements that cause pain. Be careful during activities, and avoid bumping the injured area.  Do not lift anything that is heavier than 5 lb (2.3 kg), or the limit that you are told, until your health care provider says that it is safe.  General instructions      Do not use any products that contain nicotine or tobacco, such as cigarettes, e-cigarettes, and chewing tobacco. These can delay healing. If you need help quitting, ask your health care provider.  Do deep-breathing exercises as told by your health care provider.  If you were given an incentive spirometer, use it every 1–2 hours while you are awake, or as recommended by your health care provider. This device measures how well you are filling your lungs with each breath.  Keep all follow-up visits. This is important.  Contact a health care provider if you have:  Increased bruising or swelling.  Pain that is not controlled with treatment.  A fever.  Get help right away if you:  Have difficulty breathing or shortness of breath.  Develop a continual cough, or you cough up thick or bloody mucus from your lungs (sputum).  Feel nauseous or you vomit.  Have pain in your abdomen.  These symptoms may represent a serious problem that is an emergency. Do not wait to see if the symptoms will go away. Get medical help right away. Call your local emergency services (911 in the [place] Do not drive yourself to the hospital.    Summary  A rib contusion is a deep bruise on your rib area. Contusions are the result of a blunt trauma that causes bleeding and injury to the tissues under the skin.  The skin over the contusion may turn blue, purple, or yellow. Minor injuries may cause a painless contusion. More severe contusions may be painful and swollen for a few weeks.  Rest the injured area. Avoid strenuous activity and any activities or movements that cause pain.  This information is not intended to replace advice given to you by your health care provider. Make sure you discuss any questions you have with your health care provider.

## 2024-06-13 ENCOUNTER — LABORATORY RESULT (OUTPATIENT)
Age: 89
End: 2024-06-13

## 2024-06-18 PROCEDURE — 93248 EXT ECG>7D<15D REV&INTERPJ: CPT

## 2024-06-20 ENCOUNTER — APPOINTMENT (OUTPATIENT)
Dept: ENDOCRINOLOGY | Facility: CLINIC | Age: 89
End: 2024-06-20
Payer: MEDICARE

## 2024-06-20 VITALS
HEART RATE: 69 BPM | DIASTOLIC BLOOD PRESSURE: 98 MMHG | WEIGHT: 90 LBS | SYSTOLIC BLOOD PRESSURE: 178 MMHG | BODY MASS INDEX: 20.18 KG/M2

## 2024-06-20 DIAGNOSIS — M81.0 AGE-RELATED OSTEOPOROSIS W/OUT CURRENT PATHOLOGICAL FRACTURE: ICD-10-CM

## 2024-06-20 DIAGNOSIS — E03.9 HYPOTHYROIDISM, UNSPECIFIED: ICD-10-CM

## 2024-06-20 DIAGNOSIS — E11.59 TYPE 2 DIABETES MELLITUS WITH OTHER CIRCULATORY COMPLICATIONS: ICD-10-CM

## 2024-06-20 LAB — GLUCOSE BLDC GLUCOMTR-MCNC: 96

## 2024-06-20 PROCEDURE — 82962 GLUCOSE BLOOD TEST: CPT

## 2024-06-20 PROCEDURE — 95251 CONT GLUC MNTR ANALYSIS I&R: CPT

## 2024-06-20 PROCEDURE — 99214 OFFICE O/P EST MOD 30 MIN: CPT | Mod: 25

## 2024-06-20 NOTE — ASSESSMENT
[FreeTextEntry1] : 1. T2D A1C goal <8% A1C - 10.2% (11/29/23) from 10% ( 5/8/23) from 11.3% (2/6/23) from 10% (10/21/22) C-peptide in 2/2023 low (0.9) Current regimen: Basaglar 8 units qhs, Novolog per scale: If pre-meal sugar <100, 2 units, : 4 units, 151-200: 6 units, 201-250: 7 units, 251-300: 8 units, >300: 9 units Ceci 2 uploaded and reviewed with peatient revealing  fasting hyperglycemia, worsened postprandially with intermittent overcorrection to hypo/near-hypo with rebound hyperglycemia Glucometer reading performed in office today is at goal postprandially.  Some glycemic improvement noted from Ceci, but A1C has persisted to be >10 from recent labs in May 2024.  Daughter, Tamela, admits to overcorrecting with 10u of prandial insulin at times for meals if sugars are high.  Reviewed current estimated correction factor of ~1u:60mg/dL and advised against adding more than 1-2 units, but recommend modification to scale, given that when premeal BG elevated to even not very high degree she can have a near hypos. -- increase basaglar to 9units daily, if fasting BG continues to be >150, to increase further to 10 units daily -- For Novolog, please adjust scale to: If pre-meal sugar is <100, 2 units, 100-150: 3 units, 151-200: 4 units,  201-250: 5 units, 251-300: 6 units and if >300: 7 units  -- Continue use of Freestyle Libre2  JOSE has diabetes mellitus, has been using a home blood glucose monitor and reports testing 4 times a day using the meter. she is insulin-treated with three or more daily injections of insulin. I have seen the JOSE within the last 6 months, I have observed their glucose control and determined that based on the above, the patient requires a therapeutic CGM to properly manage their blood sugars. her insulin treatment regimen requires frequent adjustments on the basis of therapeutic CGM testing results. JOSE will have a follow-up visit within the first 6 months of CGM prescription in order to evaluate adherence to CGM regimen and their diabetes treatment plan.  2. HLD On atorvastatin 40mg 2/6/23  5/2024  -- continue statin, as above with cards  4. Hypothyroidism Current thyroid regimen: Synthroid 75mcg QAM, on this dose for years 2/6/23 TSH 5 -- continue LT4 75mcg -- repeat TFTs today  5. Osteoporosis Endorses dx of osteoporosis many years ago and s/p treatment with no recent FU for minimally ~7 years. Unsure of last DEXA Fracture history includes moderately traumatic rib fracture in May 2024 with "hard fall into a table" Treatment history includes distant history of Boniva and in early/mid 2010s, s/p 3 Zoledronic acid infusions Counseling provided regarding osteoporosis, with focus on post-menopausal osteoporosis etiology, risk factors, evaluation and management with both calcium/vitD, lifestyle and medicinal.  Will discuss pending repeat DEXA, if there is need to resume therapy and agents at that time. Discussed universal care of adequate calcium/vitD intake, weight bearing exercises and fall prevention -- labs today   Follow up in 2-3 months with myself Recommended to establish care with attending endocrinologist, who is also Japanese speaking, Dr Mady Jerome, MS, [facility], [facility] [place] 06/20/2024

## 2024-06-20 NOTE — PHYSICAL EXAM
[Alert] : alert [No Acute Distress] : no acute distress [Normal Voice/Communication] : normal voice communication [Normal Hearing] : hearing was normal [No Respiratory Distress] : no respiratory distress [Normal Rate and Effort] : normal respiratory rate and effort [Oriented x3] : oriented to person, place, and time [Normal Affect] : the affect was normal [Normal Insight/Judgement] : insight and judgment were intact [Normal Mood] : the mood was normal [de-identified] : Ambulates with walker.

## 2024-06-20 NOTE — HISTORY OF PRESENT ILLNESS
[FreeTextEntry1] : JOSE PABLO is a 88 year female with pmhx of T2D (>30 years ago), HTN, HLD, hypothyroidism, osteoporosis who presents for follow-up.  Presents with Alana, daughter, providing Bulgarian interpretation, as needed.  She is active in mothers care  Last visit with myself-- 11/29/23  Interval change: No visit since November 2023, multiple calls with modifications to MDI regimen in December and missed appointment in January Continues use of Ceci 2, downloaded and reviewed with patient Endorses when sugars are high, Alana will give more insulin (10u with resultant hypos/near hypo) Recent falls x 1, one without issue and most recent ~2 weeks ago with +rib fracture.  Daughter endorses that it was "a very hard fall into a table".  Endorses h/o osteoporosis.   No other fracture history No recent osteoporosis treatment.  Last treatment ~7 years ago of Reclast (s/p 3 injections), boniva prior to Reclast. Last DEXA "years ago".   A1C - 10.3% (5/14/24) from 10.2% (11/29/23) from 10% (5/8/23) from11.3% (2/6/23) from 10% (10/21/22) Office Fingerstick -- 96 (3-4H postprandial)  Current medication: - Basaglar 8 units at night  - Novolog per scale-- If pre-meal sugar is <100, 2 units, 100-150: 4 units, 151-200: 6 units, 201-250: 7 units, 251-300: 8 units, >300: 9 units Telmisartan 40mg Atorvastatin 40mg  Past medication: - metformin 1000mg, stopped with starting MDI and GI upset - Januvia - actos - glyburide  JOSE reports they take their diabetes medication ALL of the time. JOSE ENDORSES hypoglycemia symptoms or BG <70, as above  Diabetes Self-Management: CGM Analysis performed on 06/20/2024 :  Indication for CGM placement/wear: T2DM Device : Ceci Sensor placement date:N/A -- personal device Sensor removal date:  N/A -- personal device Date of data download/printout: 06/20/2024   Active CGM wear time: 68% AVG Glucose: 218, c/w GMI of 8.5% Standard deviation: 31%   % High: 70% (time above - 35%) % In Range: 29% % Low: 1%- (time below BG 54 - 0%)   CGM Patterns: - fasting hyperglycemia, worsened postprandially with intermittent overcorrection to hypo/near-hypo with rebound hyperglycemia Clinical recommendations, per A/P section of this office visit note  Diet-- Typically consumes ~2-3 meals/daily, +/- snacks - small meals - dinner is largest: carb (pasta, rice/beans +vegetables, sometimes protein)  Ophthalmology: >1 year, will schedule FU, referral provided 2/6/23 Podiatry: >1 year, will schedule FU, referral provided 2/6/23  2. Hypothyroidism Dx ~15 years ago Current regimen: Synthroid 75 mcg QAM daily - on this dose for years No h/o radiation exposure  3. Osteoporosis DX many years ago Last DEXA "many years ago" >7 years ago. Fracture history includes rib fracture with "hard fall into table" in May 2024 with no previous fracture history Unknown if parental h/o hip fracture Treatment history include: -- Boniva many years ago -- reclast more recently x3 doses, last dosed in ~mid 2010s  Consumes ~1-2 servings of dairy from diet Supplements: once daily calcium with vitD, unsure of separate vitD

## 2024-06-20 NOTE — REVIEW OF SYSTEMS
[Nausea] : no nausea [Polyuria] : no polyuria [Joint Pain] : joint pain [Dizziness] : no dizziness [Polydipsia] : no polydipsia [Negative] : Psychiatric

## 2024-06-25 ENCOUNTER — NON-APPOINTMENT (OUTPATIENT)
Age: 89
End: 2024-06-25

## 2024-06-25 ENCOUNTER — APPOINTMENT (OUTPATIENT)
Dept: HEART AND VASCULAR | Facility: CLINIC | Age: 89
End: 2024-06-25
Payer: MEDICARE

## 2024-06-25 VITALS
WEIGHT: 92 LBS | BODY MASS INDEX: 20.7 KG/M2 | HEIGHT: 56 IN | DIASTOLIC BLOOD PRESSURE: 88 MMHG | SYSTOLIC BLOOD PRESSURE: 140 MMHG | HEART RATE: 70 BPM | TEMPERATURE: 98.2 F | OXYGEN SATURATION: 99 %

## 2024-06-25 DIAGNOSIS — R07.9 CHEST PAIN, UNSPECIFIED: ICD-10-CM

## 2024-06-25 PROCEDURE — G2211 COMPLEX E/M VISIT ADD ON: CPT | Mod: NC

## 2024-06-25 PROCEDURE — 93000 ELECTROCARDIOGRAM COMPLETE: CPT

## 2024-06-25 PROCEDURE — 99214 OFFICE O/P EST MOD 30 MIN: CPT | Mod: 25

## 2024-06-25 PROCEDURE — 36415 COLL VENOUS BLD VENIPUNCTURE: CPT

## 2024-06-26 RX ORDER — METOPROLOL SUCCINATE 25 MG/1
25 TABLET, EXTENDED RELEASE ORAL
Qty: 180 | Refills: 2 | Status: ACTIVE | COMMUNITY
Start: 2022-06-13

## 2024-06-27 PROBLEM — R07.9 CHEST PAIN: Status: ACTIVE | Noted: 2023-04-14

## 2024-07-09 ENCOUNTER — APPOINTMENT (OUTPATIENT)
Dept: HEART AND VASCULAR | Facility: CLINIC | Age: 89
End: 2024-07-09
Payer: MEDICARE

## 2024-07-09 ENCOUNTER — NON-APPOINTMENT (OUTPATIENT)
Age: 89
End: 2024-07-09

## 2024-07-09 VITALS
HEART RATE: 67 BPM | WEIGHT: 92 LBS | TEMPERATURE: 98.6 F | BODY MASS INDEX: 20.7 KG/M2 | HEIGHT: 56 IN | DIASTOLIC BLOOD PRESSURE: 84 MMHG | SYSTOLIC BLOOD PRESSURE: 174 MMHG

## 2024-07-09 DIAGNOSIS — I51.9 HEART DISEASE, UNSPECIFIED: ICD-10-CM

## 2024-07-09 PROCEDURE — 93784 AMBL BP MNTR W/SOFTWARE: CPT

## 2024-07-09 PROCEDURE — 99214 OFFICE O/P EST MOD 30 MIN: CPT | Mod: 25

## 2024-07-09 PROCEDURE — G2211 COMPLEX E/M VISIT ADD ON: CPT | Mod: NC

## 2024-07-09 PROCEDURE — 93000 ELECTROCARDIOGRAM COMPLETE: CPT

## 2024-07-25 ENCOUNTER — APPOINTMENT (OUTPATIENT)
Dept: HEART AND VASCULAR | Facility: CLINIC | Age: 89
End: 2024-07-25

## 2024-08-15 ENCOUNTER — APPOINTMENT (OUTPATIENT)
Dept: HEART AND VASCULAR | Facility: CLINIC | Age: 89
End: 2024-08-15

## 2024-09-06 ENCOUNTER — APPOINTMENT (OUTPATIENT)
Dept: ENDOCRINOLOGY | Facility: CLINIC | Age: 89
End: 2024-09-06

## 2024-09-12 ENCOUNTER — APPOINTMENT (OUTPATIENT)
Dept: GERIATRICS | Facility: CLINIC | Age: 89
End: 2024-09-12

## 2024-10-22 ENCOUNTER — APPOINTMENT (OUTPATIENT)
Dept: ENDOCRINOLOGY | Facility: CLINIC | Age: 89
End: 2024-10-22

## 2024-10-23 NOTE — ED ADULT TRIAGE NOTE - AS TEMP SITE
oral
You can access the FollowMyHealth Patient Portal offered by [facility] by registering at the following website: [email or web address]. By joining [facility]’s FollowMyHealth portal, you will also be able to view your health information using other applications (apps) compatible with our system.

## 2024-11-12 NOTE — ED ADULT NURSE NOTE - OBJECTIVE STATEMENT
Detail Level: Detailed 88yF alert to baseline axo x2 (not to time) pmhx CAD, HTN BIBEMS complaining of headache, CP, SOB, exhaustion, generalized weakness which started yesterday. Family reports pt started complaining of headache last night, accompanied with midsternal chest pain nonradiating, SOB that gets worse with exertion. Pt family reports that pt glaucoma has increasingly gotten worse and they are unsure if headache is related to it. Pt denies N/D/V, F/C, acute changes in vision.

## 2025-01-27 DIAGNOSIS — E11.59 TYPE 2 DIABETES MELLITUS WITH OTHER CIRCULATORY COMPLICATIONS: ICD-10-CM

## 2025-01-28 RX ORDER — FLASH GLUCOSE SCANNING READER
EACH MISCELLANEOUS
Qty: 1 | Refills: 0 | Status: ACTIVE | OUTPATIENT
Start: 2025-01-27

## 2025-01-28 RX ORDER — FLASH GLUCOSE SENSOR
KIT MISCELLANEOUS
Qty: 6 | Refills: 3 | Status: ACTIVE | OUTPATIENT
Start: 2025-01-27

## 2025-04-02 ENCOUNTER — APPOINTMENT (OUTPATIENT)
Dept: ENDOCRINOLOGY | Facility: CLINIC | Age: 89
End: 2025-04-02